# Patient Record
Sex: MALE | Race: WHITE | Employment: UNEMPLOYED | URBAN - METROPOLITAN AREA
[De-identification: names, ages, dates, MRNs, and addresses within clinical notes are randomized per-mention and may not be internally consistent; named-entity substitution may affect disease eponyms.]

---

## 2022-06-24 ENCOUNTER — OFFICE VISIT (OUTPATIENT)
Dept: ORTHOPEDIC SURGERY | Age: 52
End: 2022-06-24
Payer: COMMERCIAL

## 2022-06-24 DIAGNOSIS — M25.562 LEFT KNEE PAIN, UNSPECIFIED CHRONICITY: Primary | ICD-10-CM

## 2022-06-24 DIAGNOSIS — S83.242A ACUTE MEDIAL MENISCUS TEAR OF LEFT KNEE, INITIAL ENCOUNTER: ICD-10-CM

## 2022-06-24 PROCEDURE — 99204 OFFICE O/P NEW MOD 45 MIN: CPT | Performed by: ORTHOPAEDIC SURGERY

## 2022-06-24 NOTE — PROGRESS NOTES
Date:  2022    Name:  Bertram Wooten  Address:  [address] [place] [address]    :  1970      Age:   50 y.o.    SSN:  [id number]      Medical Record Number:  [id number]    Reason for Visit:    Chief Complaint    Knee Pain (new patient left knee)      DOS:2022     HPI: Bertram Wooten is a 50 y.o. male here today for evaluation of left knee pain. The patient reports that his knee pain has been going on for the past 2 years with no specific Injury. The pain is worse with going down stairs and walking for long periods. The patient complains of the catching and locking with a popping sensation in his left knee. He denies swelling, numbness or tingling. The pain occasionally wakes him up at night. The patient had a history of bilateral knee arthroscopy back in the 80s for chondromalacia. The patient has not tried any treatment so far. ROS: All systems reviewed on patient intake form. Pertinent items are noted in HPI. No past medical history on file. No past surgical history on file. No family history on file. Social History     Socioeconomic History    Marital status:      Spouse name: Not on file    Number of children: Not on file    Years of education: Not on file    Highest education level: Not on file   Occupational History    Not on file   Tobacco Use    Smoking status: Not on file    Smokeless tobacco: Not on file   Substance and Sexual Activity    Alcohol use: Not on file    Drug use: Not on file    Sexual activity: Not on file   Other Topics Concern    Not on file   Social History Narrative    Not on file     Social Determinants of Health     Financial Resource Strain:     Difficulty of Paying Living Expenses: Not on file   Food Insecurity:     Worried About Running Out of Food in the Last Year: Not on file    Kenneth of Food in the Last Year: Not on file   Transportation Needs:     Lack of Transportation (Medical):  Not on file  Lack of Transportation (Non-Medical): Not on file   Physical Activity:     Days of Exercise per Week: Not on file    Minutes of Exercise per Session: Not on file   Stress:     Feeling of Stress : Not on file   Social Connections:     Frequency of Communication with Friends and Family: Not on file    Frequency of Social Gatherings with Friends and Family: Not on file    Attends Yazidism Services: Not on file    Active Member of [address] [place] [address] or Organizations: Not on file    Attends Club or Organization Meetings: Not on file    Marital Status: Not on file   Intimate Partner Violence:     Fear of Current or Ex-Partner: Not on file    Emotionally Abused: Not on file    Physically Abused: Not on file    Sexually Abused: Not on file   Housing Stability:     Unable to Pay for Housing in the Last Year: Not on file    Number of [place] in the Last Year: Not on file    Unstable Housing in the Last Year: Not on file       No current outpatient medications on file. No current facility-administered medications for this visit. Not on File    Vital signs: There were no vitals taken for this visit. Left knee exam    Gait: No use of assistive devices. No antalgic gait. Alignment: normal alignment. Inspection/skin: Skin is intact without erythema or ecchymosis. No gross deformity. Palpation: no crepitus. Mild tenderness at the medial joint line tenderness present. Range of Motion: There is full range of motion. Strength: Normal quadriceps development. Effusion: No effusion or swelling present. Ligamentous stability: No cruciate or collateral ligament instability. Neurologic and vascular: Skin is warm and well-perfused. Sensation is intact to light-touch. Special tests: Negative Gena sign. Right knee exam    Gait: No use of assistive devices. No antalgic gait. Alignment: normal alignment.     Inspection/skin: Skin is intact without erythema or ecchymosis. No gross deformity. Palpation: no crepitus. no joint line tenderness present. Range of Motion: There is full range of motion. Strength: Normal quadriceps development. Effusion: No effusion or swelling present. Ligamentous stability: No cruciate or collateral ligament instability. Neurologic and vascular: Skin is warm and well-perfused. Sensation is intact to light-touch. Special tests: Negative Gena sign. Diagnostics:  Radiology:     Radiographs were obtained and reviewed in the office; 4 views: bilateral PA, bilateral Knowles, bilateral Merchants AND left lateral    Impression: No signs of acute fracture or dislocation. No signs of advanced osteoarthritis. Assessment: 53-year-old male patient with left knee pain with catching and locking sensation suggestive of loose body versus meniscal tear    Plan: Pertinent imaging was reviewed. The etiology, natural history, and treatment options for the disorder were discussed. The roles of activity medication, antiinflammatories, injections, bracing, physical therapy, and surgical interventions were all described to the patient and questions were answered. We discussed the diagnosis of unstable meniscal tear versus loose body in the left knee joint. We recommend MRI of the left knee to rule out meniscal tear versus loose body. Patient would like to proceed. We will see him back in the office after the MRI is done, sooner if needed. Maximus Ceederick is in agreement with this plan. All questions were answered to patient's satisfaction and was encouraged to call with any further questions. The patient was advised that NSAID-type medications have several potential side effects that include: gastrointestinal irritation including hemorrhage, renal injury, as well as an increased risk for heart attack and stroke.  The patient was asked to take the medication with food and to stop if there is any symptoms of GI upset, described above.      Cilnt Bullard MD

## 2022-07-08 ENCOUNTER — OFFICE VISIT (OUTPATIENT)
Dept: ORTHOPEDIC SURGERY | Age: 52
End: 2022-07-08
Payer: COMMERCIAL

## 2022-07-08 VITALS — HEIGHT: 71 IN | WEIGHT: 200 LBS | BODY MASS INDEX: 28 KG/M2

## 2022-07-08 DIAGNOSIS — M22.42 CHONDROMALACIA OF LEFT PATELLA: ICD-10-CM

## 2022-07-08 DIAGNOSIS — M17.12 PRIMARY OSTEOARTHRITIS OF LEFT KNEE: Primary | ICD-10-CM

## 2022-07-08 PROCEDURE — 99214 OFFICE O/P EST MOD 30 MIN: CPT | Performed by: PHYSICIAN ASSISTANT

## 2022-07-08 RX ORDER — MELOXICAM 15 MG/1
15 TABLET ORAL DAILY
Qty: 30 TABLET | Refills: 2 | Status: SHIPPED | OUTPATIENT
Start: 2022-07-08

## 2022-07-08 RX ORDER — ALBUTEROL SULFATE 90 UG/1
2 AEROSOL, METERED RESPIRATORY (INHALATION) EVERY 4 HOURS PRN
COMMUNITY
Start: 2022-02-01

## 2022-07-08 RX ORDER — FAMOTIDINE 40 MG/1
40 TABLET, FILM COATED ORAL DAILY
COMMUNITY
Start: 2022-04-29

## 2022-07-08 NOTE — PROGRESS NOTES
collateral ligament instability.      Neurologic and vascular: Skin is warm and well-perfused. Sensation is intact to light-touch.      Special tests: Negative Gena sign.         Right knee exam     Gait: No use of assistive devices. No antalgic gait.     Alignment: normal alignment.     Inspection/skin: Skin is intact without erythema or ecchymosis. No gross deformity.      Palpation: no crepitus. no joint line tenderness present.     Range of Motion: There is full range of motion.      Strength: Normal quadriceps development.      Effusion: No effusion or swelling present.      Ligamentous stability: No cruciate or collateral ligament instability.      Neurologic and vascular: Skin is warm and well-perfused. Sensation is intact to light-touch.      Special tests: Negative Gena sign.        Radiology:       Pertinent imaging was interpreted and reviewed with the patient today, both images and report. Left knee MRI on 6/30/2022     CONCLUSION:   1. 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 2. No meniscal, cruciate or ligamentous injuries. 3. Post-surgical infrapatellar - ligamentum mucosum plical scar.        Thank you for the opportunity to provide your interpretation. 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     Primary osteoarthritis of left knee Yes    Chondromalacia of left patella        Office Procedures:  Orders Placed This Encounter   Procedures   Reddy Kelly 49 ([facility])-[facility]     Referral Priority:   Routine     Referral Type:   Eval and Treat     Referral Reason:   Specialty Services Required     Requested Specialty:   Physical Therapist     Number of Visits Requested:   1         Plan: Pertinent imaging was reviewed. The etiology, natural history, and treatment options for the disorder were discussed. The roles of activity medication, antiinflammatories, injections, bracing, physical therapy, and surgical interventions were all described to the patient and questions were answered. Sintia Hollingsworth does have focal cartilage damage of the trochlear notch with cyst formation which looks unstable. Patient has not tried conservative measures yet therefore I do believe he is a reasonable candidate for physical therapy and meloxicam.  We will see him back in 1 month for reevaluation. Gaurang Roger is in agreement with this plan. All questions were answered to patient's satisfaction and was encouraged to call with any further questions. Total time spent for evaluation, education, and development of treatment plan: 35 minutes    Kayleigh Bajwa, [address] [facility]  7/8/2022    This dictation was performed with a verbal recognition program [facility]) and it was checked for errors. It is possible that there are still dictated errors within this office note. If so, please bring any areas to my attention for an addendum. All efforts were made to ensure that this office note is accurate.

## 2022-07-18 ENCOUNTER — HOSPITAL ENCOUNTER (OUTPATIENT)
Dept: PHYSICAL THERAPY | Age: 52
Setting detail: THERAPIES SERIES
Discharge: HOME | End: 2022-07-18
Payer: COMMERCIAL

## 2022-07-18 PROCEDURE — 97161 PT EVAL LOW COMPLEX 20 MIN: CPT

## 2022-07-18 PROCEDURE — 97110 THERAPEUTIC EXERCISES: CPT

## 2022-07-18 NOTE — FLOWSHEET NOTE
The [address] [place] [address]      Physical Therapy Treatment Note/ Progress Report:     Date:  2022    Patient Name:  Jermain Kapadia    :  1970  MRN: [id number]  Restrictions/Precautions:    Medical/Treatment Diagnosis Information:  Diagnosis: M17.12 primary OA L knee, M22.42 chondromalacia L patella  Treatment Diagnosis: M25.562 L knee pain  Insurance/Certification information:   [facility]  Physician Information:   Luis Antonio Gallego  Has the plan of care been signed (Y/N):        []  Yes  [x]  No     Date of Patient follow up with Physician: 22    Is this a Progress Report:     []  Yes  [x]  No      If Yes:  Date Range for reporting period:  Beginning:  ------------ Ending:     Progress report will be due (10 Rx or 30 days whichever is less):        Recertification will be due (POC Duration  / 90 days whichever is less):         Visit # Insurance Allowable Auth Required   In Person 1 Check NV []  Yes     []  No    Tele Health   []  Yes     []  No    Total 1       Functional Scale: FOTO: 64/100    Date assessed:  22      Latex Allergy:  [x]NO      []YES  Preferred Language for Healthcare:   [x]English       []other:    Pain level:  3/10     SUBJECTIVE:  See eval    OBJECTIVE: See eval  Observation:   Test measurements:      RESTRICTIONS/PRECAUTIONS: OA    Exercises/Interventions:   Therapeutic Ex (30873) Sets/sec Reps Notes/CUES HEP   SLR 2-3 10     Bridge 2-3 10     SL hip ABD 2-3 10     HS S 30\" 2-3                                                      Pt edu: HEP, dx, POC, ice       Manual Intervention (98316)       Rolling  NV                                         NMR re-education (05572)   CUES NEEDED                                                                   Therapeutic Activity (99553)                                          [employer] access code: [email or web address]           Therapeutic Exercise and NMR EXR  [x] (81888) Provided verbal/tactile cueing for activities related to strengthening, flexibility, endurance, ROM for improvements in LE, proximal hip, and core control with self care, mobility, lifting, ambulation. [x] (72227) Provided verbal/tactile cueing for activities related to improving balance, coordination, kinesthetic sense, posture, motor skill, proprioception to assist with LE, proximal hip, and core control in self-care, mobility, lifting, ambulation and eccentric single leg control.      NMR and Therapeutic Activities:    [x] (52426 or 25815) Provided verbal/tactile cueing for activities related to improving balance, coordination, kinesthetic sense, posture, motor skill, proprioception and motor activation to allow for proper function of core, proximal hip and LE with self-care and ADLs and functional mobility.   [] (97555) Gait Re-education- Provided training and instruction to the patient for proper LE, core and proximal hip recruitment and positioning and eccentric body weight control with ambulation re-education including up and down stairs     Home Exercise Program:    [x] (50089) Reviewed/Progressed HEP activities related to strengthening, flexibility, endurance, ROM of core, proximal hip and LE for functional self-care, mobility, lifting and ambulation/stair navigation   [] (36248) Reviewed/Progressed HEP activities related to improving balance, coordination, kinesthetic sense, posture, motor skill, proprioception of core, proximal hip and LE for self-care, mobility, lifting, and ambulation/stair navigation      Manual Treatments:  PROM / STM / Oscillations-Mobs:  G-I, II, III, IV (PA's, Inf., Post.)  [] (81852) Provided manual therapy to mobilize LE, proximal hip and/or LS spine soft tissue/joints for the purpose of modulating pain, promoting relaxation, increasing ROM, reducing/eliminating soft tissue swelling/inflammation/restriction, improving soft tissue extensibility and allowing for proper ROM for normal function with self-care, mobility, lifting and ambulation. Modalities:  ice x10 min NV   [] GAME READY (VASO)- for significant edema, swelling, pain control. Charges:  Timed Code Treatment Minutes: 25   Total Treatment Minutes:  45      [x] EVAL (LOW) 66927 (typically 20 minutes face-to-face)  [] EVAL (MOD) 21996 (typically 30 minutes face-to-face)  [] EVAL (HIGH) 90013 (typically 45 minutes face-to-face)  [] RE-EVAL     [x] UD(55106) x   2  [] IONTO  [] NMR (52548) x     [] VASO  [] Manual (40146) x     [] Other:  [] TA x      [] Mech Traction (24444)  [] ES(attended) (38133)      [] ES (un) (68969):    ASSESSMENT:  See eval    GOALS:     Patient stated goal: none     Therapist goals for Patient:  Short Term Goals: To be achieved in: 2 weeks  1. Independent in HEP and progression per patient tolerance, in order to prevent re-injury. [] Progressing: [] Met: [] Not Met: [] Adjusted     2. Patient will have a decrease in pain to facilitate improvement in movement, function, and ADLs as indicated by Functional Deficits. [] Progressing: [] Met: [] Not Met: [] Adjusted     Long Term Goals: To be achieved in: 6-8 weeks  1. FOTO >/= 75/100 to assist with reaching prior level of function. [] Progressing: [] Met: [] Not Met: [] Adjusted     2. Patient will demonstrate increased AROM to WNL to allow for proper joint functioning as indicated by patients Functional Deficits. [] Progressing: [] Met: [] Not Met: [] Adjusted     3. Patient will demonstrate an increase in Strength to good proximal hip strength and control, to 5/5 in LE to allow for proper functional mobility as indicated by patients Functional Deficits. [] Progressing: [] Met: [] Not Met: [] Adjusted     4. Patient will return to all functional activities without increased symptoms or restriction. [] Progressing: [] Met: [] Not Met: [] Adjusted     5.  Pt will exhibit normalized gait pattern. (patient specific functional goal)    [] Progressing: [] Met: [] Not Met: [] Adjusted          Access Code: [email or web address]  URL: [email or web address]  Date: 07/18/2022  Prepared by: Brittani Comes    Exercises  Supine Active Straight Leg Raise - 1 x daily - 7 x weekly - 2-3 sets - 10 reps  Supine Bridge - 1 x daily - 7 x weekly - 2-3 sets - 10 reps  Sidelying Hip Abduction - 1 x daily - 7 x weekly - 2-3 sets - 10 reps  Seated Hamstring Stretch - 1 x daily - 7 x weekly - 3 sets - 10 reps      Overall Progression Towards Functional goals/ Treatment Progress Update:  [] Patient is progressing as expected towards functional goals listed. [] Progression is slowed due to complexities/Impairments listed. [] Progression has been slowed due to co-morbidities.   [x] Plan just implemented, too soon to assess goals progression <30days   [] Goals require adjustment due to lack of progress  [] Patient is not progressing as expected and requires additional follow up with physician  [] Other    Prognosis for POC: [x] Good [] Fair  [] Poor      Patient requires continued skilled intervention: [x] Yes  [] No    Treatment/Activity Tolerance:  [x] Patient able to complete treatment  [] Patient limited by fatigue  [] Patient limited by pain    [] Patient limited by other medical complications  [] Other:     Return to Play: (if applicable)   []  Stage 1: Intro to Strength   []  Stage 2: Return to Run and Strength   []  Stage 3: Return to Jump and Strength   []  Stage 4: Dynamic Strength and Agility   []  Stage 5: Sport Specific Training     []  Ready to Return to Play, Meets All Above Stages   []  Not Ready for Return to Sports   Comments:                          PLAN: See eval  [] Continue per plan of care [] Alter current plan (see comments above)  [x] Plan of care initiated [] Hold pending MD visit [] Discharge    Electronically signed by:  Jeremias Carlson PT    Note: If patient does not return for scheduled/ recommended follow up visits, this note will serve as a discharge from care along with most recent update on progress.

## 2022-07-18 NOTE — PLAN OF CARE
The [address] [place] [address]       Physical Therapy Certification    Dear  JHOANA Shelby,    We had the pleasure of evaluating the following patient for physical therapy services at [address] [place] [address]. A summary of our findings can be found in the initial assessment below. This includes our plan of care. If you have any questions or concerns regarding these findings, please do not hesitate to contact me at the office phone number checked above. Thank you for the referral.       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   [] Yes, Patient intake triggered further evaluation      [] C-SSRS Screening completed  [] PCP notified via Plan of Care  [] Emergency services notified     Latex Allergy:  [x]NO      []YES  Preferred Language for Healthcare:   [x]English       []other:    SUBJECTIVE: Patient stated complaint: Pt c/o L knee pain that started off and on a couple years ago. Pt has hx of remote L knee pain  in high school but notes this is a recurrence of the same issue. Pt notes 2-3 months ago dull pain became constant and since then has been worsening. Pt occasionally gets a sharp pain and a pop that is very painful for a of time after that but does resolve back to constant achy pain.  Pt notes his L knee feels unstable and has buckled. Pt has not noticed any swelling however is not sure that there is none. Pt's pain is deep within knee, from sides of patella and anterior knee. Pt finally saw Dr. Mary Jane Paredes and Angel Daniels who recommended PT first however pt notes he feels he may have to surgery in the end.      Relevant Medical History:(-) latex allergy, (-) adhesive sensitivity, (-) pacemaker, (-) metal/electrical implants, (-) heart history, (-) CA, (-) stroke, (-) seizure, (-) diabetes, (-) asthma or SOB    Functional Disability Index: FOTO: 64/100    Pain Scale: 3/10 (dull achy)  Easing factors: tylenol prn  Provocative factors: down steps worse than up steps, prolonged walking, prolonged activity     Type: [x]Constant   []Intermittent  []Radiating [x]Localized []other:     Numbness/Tingling: denies    Occupation/School: unemployed    Living Status/Prior Level of Function: Independent with ADLs and IADLs,     OBJECTIVE:     ROM LEFT RIGHT   HIP Flex     HIP Abd     HIP Ext     HIP IR     HIP ER     Knee ext 0 °  0 °    Knee Flex 140 °  142  °    Ankle PF     Ankle DF     Ankle In     Ankle Ev     Strength  LEFT RIGHT   HIP Flexors     HIP Abductors     HIP Ext     Hip ER     Knee EXT (quad) 4/5 5/5   Knee Flex (HS) 5/5 5/5   Ankle DF     Ankle PF     Ankle Inv     Ankle EV          Circumference  Mid patella     39.0 cm     39.0 cm       Reflexes/Sensation:    [x]Dermatomes/Myotomes intact    [x]Reflexes equal and normal bilaterally   []Other:    Joint mobility:    []Normal    [x]Hypo: patella inf/sup; med/lat painful both directions   []Hyper    Palpation: not overly tender to palpation however ? sensitivity compared to other knee    Functional Mobility/Transfers: some limitation with sit to/from stand    Posture: static stance: ? toeing out LLE    Bandages/Dressings/Incisions: N/A    Gait: (include devices/WB status) antalgic gait, ? quad control in swing phase    Orthopedic Special Tests: NT [x] Patient history, allergies, meds reviewed. Medical chart reviewed. See intake form. Review Of Systems (ROS):  [x]Performed Review of systems (Integumentary, CardioPulmonary, Neurological) by intake and observation. Intake form has been scanned into medical record. Patient has been instructed to contact their primary care physician regarding ROS issues if not already being addressed at this time. Co-morbidities/Complexities (which will affect course of rehabilitation):   []None           Arthritic conditions   []Rheumatoid arthritis (M05.9)  [x]Osteoarthritis (M19.91)   Cardiovascular conditions   []Hypertension (I10)  []Hyperlipidemia (E78.5)  []Angina pectoris (I20)  []Atherosclerosis (I70)   Musculoskeletal conditions   []Disc pathology   []Congenital spine pathologies   []Prior surgical intervention  []Osteoporosis (M81.8)  []Osteopenia (M85.8)   Endocrine conditions   []Hypothyroid (E03.9)  []Hyperthyroid Gastrointestinal conditions   []Constipation (V22.41)   Metabolic conditions   []Morbid obesity (E66.01)  []Diabetes type 1(E10.65) or 2 (E11.65)   []Neuropathy (G60.9)     Pulmonary conditions   []Asthma (J45)  []Coughing   []COPD (J44.9)   Psychological Disorders  []Anxiety (F41.9)  []Depression (F32.9)   []Other:   []Other:          Barriers to/and or personal factors that will affect rehab potential:              []Age  []Sex              []Motivation/Lack of Motivation                        []Co-Morbidities              []Cognitive Function, education/learning barriers              []Environmental, home barriers              []profession/work barriers  []past PT/medical experience  []other:  Justification: none    Falls Risk Assessment (30 days):   [x] Falls Risk assessed and no intervention required.   [] Falls Risk assessed and Patient requires intervention due to being higher risk   TUG score (>12s at risk):     [] Falls education provided, including         ASSESSMENT: Functional Impairments:     [x]Noted lumbar/proximal hip/LE joint hypomobility   [x]Decreased LE functional ROM   [x]Decreased core/proximal hip strength and neuromuscular control   [x]Decreased LE functional strength   [x]Reduced balance/proprioceptive control   []other:      Functional Activity Limitations (from functional questionnaire and intake)   [x]Reduced ability to tolerate prolonged functional positions   [x]Reduced ability or difficulty with changes of positions or transfers between positions   []Reduced ability to maintain good posture and demonstrate good body mechanics with sitting, bending, and lifting   []Reduced ability to sleep   [] Reduced ability or tolerance with driving and/or computer work   [x]Reduced ability to perform lifting, carrying tasks   [x]Reduced ability to squat   [x]Reduced ability to forward bend   [x]Reduced ability to ambulate prolonged functional periods/distances/surfaces   [x]Reduced ability to ascend/descend stairs   [x]Reduced ability to run, hop, cut or jump   []other:    Participation Restrictions   []Reduced participation in self care activities   [x]Reduced participation in home management activities   []Reduced participation in work activities   [x]Reduced participation in social activities. [x]Reduced participation in sport/recreation activities. Classification :    []Signs/symptoms consistent with post-surgical status including decreased ROM, strength and function.    []Signs/symptoms consistent with joint sprain/strain   []Signs/symptoms consistent with patella-femoral syndrome   [x]Signs/symptoms consistent with knee OA/hip OA   []Signs/symptoms consistent with internal derangement of knee/Hip   []Signs/symptoms consistent with functional hip weakness/NMR control      []Signs/symptoms consistent with tendinitis/tendinosis    []signs/symptoms consistent with pathology which may benefit from Dry needling      []other:      Prognosis/Rehab Potential: []Excellent   [x]Good    []Fair   []Poor    Tolerance of evaluation/treatment:    []Excellent   [x]Good    []Fair   []Poor    Physical Therapy Evaluation Complexity Justification  [x] A history of present problem with:  [x] no personal factors and/or comorbidities that impact the plan of care;  []1-2 personal factors and/or comorbidities that impact the plan of care  []3 personal factors and/or comorbidities that impact the plan of care  [x] An examination of body systems using standardized tests and measures addressing any of the following: body structures and functions (impairments), activity limitations, and/or participation restrictions;:  [] a total of 1-2 or more elements   [x] a total of 3 or more elements   [] a total of 4 or more elements   [x] A clinical presentation with:  [x] stable and/or uncomplicated characteristics   [] evolving clinical presentation with changing characteristics  [] unstable and unpredictable characteristics;   [x] Clinical decision making of [x] low, [] moderate, [] high complexity using standardized patient assessment instrument and/or measurable assessment of functional outcome. [x] EVAL (LOW) 83907 (typically 20 minutes face-to-face)  [] EVAL (MOD) 77860 (typically 30 minutes face-to-face)  [] EVAL (HIGH) 15265 (typically 45 minutes face-to-face)  [] RE-EVAL     PLAN:   Frequency/Duration:  1-2 days per week for 6-8 Weeks:  Interventions:  [x]  Therapeutic exercise including: strength training, ROM, for Lower extremity and core   [x]  NMR activation and proprioception for LE, Glutes and Core   [x]  Manual therapy as indicated for LE, Hip and spine to include: Dry Needling/IASTM, STM, PROM, Gr I-IV mobilizations, manipulation. [x] Modalities as needed that may include: thermal agents, E-stim, Biofeedback, US, iontophoresis as indicated  [x] Patient education on joint protection, postural re-education, activity modification, progression of HEP.     HEP instruction: Refer to Mayela Rubio access code and exercises on the 1st visit treatment note    GOALS:  Patient stated goal: none    Therapist goals for Patient:   Short Term Goals: To be achieved in: 2 weeks  1. Independent in HEP and progression per patient tolerance, in order to prevent re-injury. [] Progressing: [] Met: [] Not Met: [] Adjusted     2. Patient will have a decrease in pain to facilitate improvement in movement, function, and ADLs as indicated by Functional Deficits. [] Progressing: [] Met: [] Not Met: [] Adjusted     Long Term Goals: To be achieved in: 6-8 weeks  1. FOTO >/= 75/100 to assist with reaching prior level of function. [] Progressing: [] Met: [] Not Met: [] Adjusted     2. Patient will demonstrate increased AROM to WNL to allow for proper joint functioning as indicated by patients Functional Deficits. [] Progressing: [] Met: [] Not Met: [] Adjusted     3. Patient will demonstrate an increase in Strength to good proximal hip strength and control, to 5/5 in LE to allow for proper functional mobility as indicated by patients Functional Deficits. [] Progressing: [] Met: [] Not Met: [] Adjusted     4. Patient will return to all functional activities without increased symptoms or restriction. [] Progressing: [] Met: [] Not Met: [] Adjusted     5.  Pt will exhibit normalized gait pattern. (patient specific functional goal)    [] Progressing: [] Met: [] Not Met: [] Adjusted      Electronically signed by:  Ashwini Montez, PT

## 2022-07-25 ENCOUNTER — HOSPITAL ENCOUNTER (OUTPATIENT)
Dept: PHYSICAL THERAPY | Age: 52
Setting detail: THERAPIES SERIES
Discharge: HOME | End: 2022-07-25
Payer: COMMERCIAL

## 2022-07-25 PROCEDURE — 97110 THERAPEUTIC EXERCISES: CPT

## 2022-07-25 PROCEDURE — 97140 MANUAL THERAPY 1/> REGIONS: CPT

## 2022-07-25 NOTE — FLOWSHEET NOTE
NMR re-education (77821)   CUES NEEDED                                                                   Therapeutic Activity (94380)                                          [employer] access code: [email or web address]           Therapeutic Exercise and NMR EXR  [x] (89233) Provided verbal/tactile cueing for activities related to strengthening, flexibility, endurance, ROM for improvements in LE, proximal hip, and core control with self care, mobility, lifting, ambulation. [x] (46958) Provided verbal/tactile cueing for activities related to improving balance, coordination, kinesthetic sense, posture, motor skill, proprioception to assist with LE, proximal hip, and core control in self-care, mobility, lifting, ambulation and eccentric single leg control.      NMR and Therapeutic Activities:    [x] (69591 or 16753) Provided verbal/tactile cueing for activities related to improving balance, coordination, kinesthetic sense, posture, motor skill, proprioception and motor activation to allow for proper function of core, proximal hip and LE with self-care and ADLs and functional mobility.   [] (82066) Gait Re-education- Provided training and instruction to the patient for proper LE, core and proximal hip recruitment and positioning and eccentric body weight control with ambulation re-education including up and down stairs     Home Exercise Program:    [x] (79735) Reviewed/Progressed HEP activities related to strengthening, flexibility, endurance, ROM of core, proximal hip and LE for functional self-care, mobility, lifting and ambulation/stair navigation   [] (81638) Reviewed/Progressed HEP activities related to improving balance, coordination, kinesthetic sense, posture, motor skill, proprioception of core, proximal hip and LE for self-care, mobility, lifting, and ambulation/stair navigation      Manual Treatments:  PROM / STM / Oscillations-Mobs:  G-I, II, III, IV (PA's, Inf., Post.)  [] (05110) Provided manual therapy to mobilize LE, proximal hip and/or LS spine soft tissue/joints for the purpose of modulating pain, promoting relaxation, increasing ROM, reducing/eliminating soft tissue swelling/inflammation/restriction, improving soft tissue extensibility and allowing for proper ROM for normal function with self-care, mobility, lifting and ambulation. Modalities:  declined ice   [] GAME READY (VASO)- for significant edema, swelling, pain control. Charges:  Timed Code Treatment Minutes: 40   Total Treatment Minutes:  40      [] EVAL (LOW) 19446 (typically 20 minutes face-to-face)  [] EVAL (MOD) 38453 (typically 30 minutes face-to-face)  [] EVAL (HIGH) 76330 (typically 45 minutes face-to-face)  [] RE-EVAL     [x] ZD(37382) x   2  [] IONTO  [] NMR (59386) x     [] VASO  [x] Manual (22573) x    1 [] Other:  [] TA x      [] Mech Traction (80284)  [] ES(attended) (03697)      [] ES (un) (01763):    ASSESSMENT:  Pt exhibits hip posterior capsule tightness as well as ? quad strength and stability contributing to tightness with change in position. Pt will benefit from focused quad and lateral hip strength as well as ? hip mobility to improve tolerance to functional activities. GOALS:     Patient stated goal: none     Therapist goals for Patient:  Short Term Goals: To be achieved in: 2 weeks  1. Independent in HEP and progression per patient tolerance, in order to prevent re-injury. [] Progressing: [] Met: [] Not Met: [] Adjusted     2. Patient will have a decrease in pain to facilitate improvement in movement, function, and ADLs as indicated by Functional Deficits. [] Progressing: [] Met: [] Not Met: [] Adjusted     Long Term Goals: To be achieved in: 6-8 weeks  1. FOTO >/= 75/100 to assist with reaching prior level of function. [] Progressing: [] Met: [] Not Met: [] Adjusted     2.  Patient will demonstrate increased AROM to WNL to allow for proper joint functioning as indicated by patients Functional Deficits. [] Progressing: [] Met: [] Not Met: [] Adjusted     3. Patient will demonstrate an increase in Strength to good proximal hip strength and control, to 5/5 in LE to allow for proper functional mobility as indicated by patients Functional Deficits. [] Progressing: [] Met: [] Not Met: [] Adjusted     4. Patient will return to all functional activities without increased symptoms or restriction. [] Progressing: [] Met: [] Not Met: [] Adjusted     5. Pt will exhibit normalized gait pattern. (patient specific functional goal)    [] Progressing: [] Met: [] Not Met: [] Adjusted          Access Code: [email or web address]  URL: [email or web address]  Date: 07/18/2022  Prepared by: Genaro Neither    Exercises  Supine Active Straight Leg Raise - 1 x daily - 7 x weekly - 2-3 sets - 10 reps  Supine Bridge - 1 x daily - 7 x weekly - 2-3 sets - 10 reps  Sidelying Hip Abduction - 1 x daily - 7 x weekly - 2-3 sets - 10 reps  Seated Hamstring Stretch - 1 x daily - 7 x weekly - 3 sets - 10 reps      Overall Progression Towards Functional goals/ Treatment Progress Update:  [] Patient is progressing as expected towards functional goals listed. [] Progression is slowed due to complexities/Impairments listed. [] Progression has been slowed due to co-morbidities.   [x] Plan just implemented, too soon to assess goals progression <30days   [] Goals require adjustment due to lack of progress  [] Patient is not progressing as expected and requires additional follow up with physician  [] Other    Prognosis for POC: [x] Good [] Fair  [] Poor      Patient requires continued skilled intervention: [x] Yes  [] No    Treatment/Activity Tolerance:  [x] Patient able to complete treatment  [] Patient limited by fatigue  [] Patient limited by pain    [] Patient limited by other medical complications  [] Other:                         PLAN: See eval  [x] Continue per plan of care [] Alter current plan (see comments above)  [] Plan of care initiated [] Hold pending MD visit [] Discharge    Electronically signed by:  Jim Macias PT    Note: If patient does not return for scheduled/ recommended follow up visits, this note will serve as a discharge from care along with most recent update on progress.

## 2022-08-01 ENCOUNTER — HOSPITAL ENCOUNTER (OUTPATIENT)
Dept: PHYSICAL THERAPY | Age: 52
Setting detail: THERAPIES SERIES
Discharge: HOME | End: 2022-08-01
Payer: COMMERCIAL

## 2022-08-01 PROCEDURE — 97110 THERAPEUTIC EXERCISES: CPT

## 2022-08-01 PROCEDURE — 97140 MANUAL THERAPY 1/> REGIONS: CPT

## 2022-08-01 NOTE — FLOWSHEET NOTE
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  Treatment Diagnosis: M25.562 L knee pain  Insurance/Certification information:   [facility]  Physician Information:   Demetrio Cranker  Has the plan of care been signed (Y/N):        [x]  Yes  []  No     Date of Patient follow up with Physician: 22    Is this a Progress Report:     []  Yes  [x]  No      If Yes:  Date Range for reporting period:  Beginning:  ------------ Ending:     Progress report will be due (10 Rx or 30 days whichever is less):        Recertification will be due (POC Duration  / 90 days whichever is less):         Visit # Insurance Allowable Auth Required   In Person 3 Check NV []  Yes     []  No    Tele Health   []  Yes     []  No    Total 3       Functional Scale: FOTO: 64/100    Date assessed:  22      Latex Allergy:  [x]NO      []YES  Preferred Language for Healthcare:   [x]English       []other:    Pain level:  3/10     SUBJECTIVE:  Pt reports he feels overall more consistent pain and it hurts more to use his knee. Pt notes he has not had sharp pain but thinks that is because he is more conscious of not doing anything to hurt. Pt also notes some difficulty getting comfortable at night that's not new but is more persistent recently.      OBJECTIVE:   Observation: lateral tracking patellae  Test measurements:      RESTRICTIONS/PRECAUTIONS: OA    Exercises/Interventions:   Therapeutic Ex (56574) Sets/sec Reps Notes/CUES HEP   SLR 3 10 bilat x   Bridge + hip add iso 3 10  x   SL hip ABD  3 10 bilat x   HS S  HS S supine 30\"  20\" 5  5 Seated, bilat X  x   5\" 15 BlueTB    Gastroc S 30\" 5 bilat x                                      Pt edu: dx and progression of care x5'  Discussed would stop PT should pain cont to ? Manual Intervention (46297)       Rolling : quad, HS, gastroc x7'      Post hip mobs Left x4'  Note improved hip IR after mobs                                NMR re-education (77528)   CUES NEEDED                                                                   Therapeutic Activity (16346)                                          Mega[employer]oot access code: [email or web address]           Therapeutic Exercise and NMR EXR  [x] (28432) Provided verbal/tactile cueing for activities related to strengthening, flexibility, endurance, ROM for improvements in LE, proximal hip, and core control with self care, mobility, lifting, ambulation. [x] (17115) Provided verbal/tactile cueing for activities related to improving balance, coordination, kinesthetic sense, posture, motor skill, proprioception to assist with LE, proximal hip, and core control in self-care, mobility, lifting, ambulation and eccentric single leg control.      NMR and Therapeutic Activities:    [x] (42579 or 51525) Provided verbal/tactile cueing for activities related to improving balance, coordination, kinesthetic sense, posture, motor skill, proprioception and motor activation to allow for proper function of core, proximal hip and LE with self-care and ADLs and functional mobility.   [] (63554) Gait Re-education- Provided training and instruction to the patient for proper LE, core and proximal hip recruitment and positioning and eccentric body weight control with ambulation re-education including up and down stairs     Home Exercise Program:    [x] (38862) Reviewed/Progressed HEP activities related to strengthening, flexibility, endurance, ROM of core, proximal hip and LE for functional self-care, mobility, lifting and ambulation/stair navigation   [] (48412) Reviewed/Progressed HEP activities related to improving balance, coordination, kinesthetic sense, posture, motor skill, proprioception of core, proximal hip and LE for self-care, mobility, lifting, and ambulation/stair navigation      Manual Treatments:  PROM / STM / Oscillations-Mobs:  G-I, II, III, IV (PA's, Inf., Post.)  [x] (46115) Provided manual therapy to mobilize LE, proximal hip and/or LS spine soft tissue/joints for the purpose of modulating pain, promoting relaxation, increasing ROM, reducing/eliminating soft tissue swelling/inflammation/restriction, improving soft tissue extensibility and allowing for proper ROM for normal function with self-care, mobility, lifting and ambulation. Modalities:  declined ice   [] GAME READY (VASO)- for significant edema, swelling, pain control. Charges:  Timed Code Treatment Minutes: 40   Total Treatment Minutes:  40      [] EVAL (LOW) 00271 (typically 20 minutes face-to-face)  [] EVAL (MOD) 62097 (typically 30 minutes face-to-face)  [] EVAL (HIGH) 72473 (typically 45 minutes face-to-face)  [] RE-EVAL     [x] WT(81394) x   2  [] IONTO  [] NMR (21631) x     [] VASO  [x] Manual (14358) x    1 [] Other:  [] TA x      [] Mech Traction (95872)  [] ES(attended) (21927)      [] ES (un) (25735):    ASSESSMENT:  Pt exhibits hip posterior capsule tightness as well as ? quad strength and stability contributing to tightness with change in position. Pt will benefit from focused quad and lateral hip strength as well as ? hip mobility to improve tolerance to functional activities. GOALS:     Patient stated goal: none     Therapist goals for Patient:  Short Term Goals: To be achieved in: 2 weeks  1. Independent in HEP and progression per patient tolerance, in order to prevent re-injury. [] Progressing: [] Met: [] Not Met: [] Adjusted     2. Patient will have a decrease in pain to facilitate improvement in movement, function, and ADLs as indicated by Functional Deficits. [] Progressing: [] Met: [] Not Met: [] Adjusted     Long Term Goals: To be achieved in: 6-8 weeks  1.  FOTO >/= 75/100 to assist with reaching prior level of function. [] Progressing: [] Met: [] Not Met: [] Adjusted     2. Patient will demonstrate increased AROM to WNL to allow for proper joint functioning as indicated by patients Functional Deficits. [] Progressing: [] Met: [] Not Met: [] Adjusted     3. Patient will demonstrate an increase in Strength to good proximal hip strength and control, to 5/5 in LE to allow for proper functional mobility as indicated by patients Functional Deficits. [] Progressing: [] Met: [] Not Met: [] Adjusted     4. Patient will return to all functional activities without increased symptoms or restriction. [] Progressing: [] Met: [] Not Met: [] Adjusted     5. Pt will exhibit normalized gait pattern. (patient specific functional goal)    [] Progressing: [] Met: [] Not Met: [] Adjusted          Access Code: [email or web address]  URL: [email or web address]  Date: 07/18/2022  Prepared by: Inman Splinter    Exercises  Supine Active Straight Leg Raise - 1 x daily - 7 x weekly - 2-3 sets - 10 reps  Supine Bridge - 1 x daily - 7 x weekly - 2-3 sets - 10 reps  Sidelying Hip Abduction - 1 x daily - 7 x weekly - 2-3 sets - 10 reps  Seated Hamstring Stretch - 1 x daily - 7 x weekly - 3 sets - 10 reps      Overall Progression Towards Functional goals/ Treatment Progress Update:  [] Patient is progressing as expected towards functional goals listed. [] Progression is slowed due to complexities/Impairments listed. [] Progression has been slowed due to co-morbidities.   [x] Plan just implemented, too soon to assess goals progression <30days   [] Goals require adjustment due to lack of progress  [] Patient is not progressing as expected and requires additional follow up with physician  [] Other    Prognosis for POC: [x] Good [] Fair  [] Poor      Patient requires continued skilled intervention: [x] Yes  [] No    Treatment/Activity Tolerance:  [x] Patient able to complete treatment  [] Patient limited by fatigue  [] Patient limited by pain    [] Patient limited by other medical complications  [] Other:                         PLAN: See eval  [x] Continue per plan of care [] Alter current plan (see comments above)  [] Plan of care initiated [] Hold pending MD visit [] Discharge    Electronically signed by:  Erica Hassan PT    Note: If patient does not return for scheduled/ recommended follow up visits, this note will serve as a discharge from care along with most recent update on progress.

## 2022-08-08 ENCOUNTER — HOSPITAL ENCOUNTER (OUTPATIENT)
Dept: PHYSICAL THERAPY | Age: 52
Setting detail: THERAPIES SERIES
Discharge: HOME | End: 2022-08-08
Payer: COMMERCIAL

## 2022-08-08 PROCEDURE — 97110 THERAPEUTIC EXERCISES: CPT

## 2022-08-08 PROCEDURE — 97140 MANUAL THERAPY 1/> REGIONS: CPT

## 2022-08-08 NOTE — FLOWSHEET NOTE
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  Treatment Diagnosis: M25.562 L knee pain  Insurance/Certification information:   [facility]  Physician Information:   Brendon Burdick  Has the plan of care been signed (Y/N):        [x]  Yes  []  No     Date of Patient follow up with Physician: 22    Is this a Progress Report:     []  Yes  [x]  No      If Yes:  Date Range for reporting period:  Beginning:  ------------ Ending:     Progress report will be due (10 Rx or 30 days whichever is less):        Recertification will be due (POC Duration  / 90 days whichever is less):         Visit # Insurance Allowable Auth Required   In Person 4 Check NV []  Yes     []  No    Tele Health   []  Yes     []  No    Total 4       Functional Scale: FOTO: 64/100    Date assessed:  22      Latex Allergy:  [x]NO      []YES  Preferred Language for Healthcare:   [x]English       []other:    Pain level:  3/10     SUBJECTIVE:  Pt reports his pain is still around a 3/10. Pt has had a hard time doing the exercises due to pain. Pt had a hard time figuring out what triggers his pain. Pt states he has good and bad days. OBJECTIVE:   Observation: lateral tracking patellae  Test measurements:      RESTRICTIONS/PRECAUTIONS: OA    Exercises/Interventions:   Therapeutic Ex (91877) Sets/sec Reps Notes/CUES HEP   SLR 3 10 bilat X D/C for home   Bridge + hip add iso 3 10  x   SL hip ABD  3 10 bilat x   HS S  HS S supine 30\"  20\" 5  5 Seated, bilat X  x   5\" 15 BlueTB    Gastroc S 30\" 5 bilat x   clamshell NV      Wall sit 5\" 10 Tried , did cause ?  pain and flare up                          Discussed would stop PT should pain cont to ?     Manual Intervention (07572)       Rolling : quad, HS, gastroc x8'       Note improved hip IR after mobs                                NMR re-education (37600)   CUES NEEDED                                                                   Therapeutic Activity (11663)                                          [employer] access code: [email or web address]           Therapeutic Exercise and NMR EXR  [x] (16755) Provided verbal/tactile cueing for activities related to strengthening, flexibility, endurance, ROM for improvements in LE, proximal hip, and core control with self care, mobility, lifting, ambulation. [x] (02989) Provided verbal/tactile cueing for activities related to improving balance, coordination, kinesthetic sense, posture, motor skill, proprioception to assist with LE, proximal hip, and core control in self-care, mobility, lifting, ambulation and eccentric single leg control.      NMR and Therapeutic Activities:    [x] (02039 or 87482) Provided verbal/tactile cueing for activities related to improving balance, coordination, kinesthetic sense, posture, motor skill, proprioception and motor activation to allow for proper function of core, proximal hip and LE with self-care and ADLs and functional mobility.   [] (88940) Gait Re-education- Provided training and instruction to the patient for proper LE, core and proximal hip recruitment and positioning and eccentric body weight control with ambulation re-education including up and down stairs     Home Exercise Program:    [x] (78853) Reviewed/Progressed HEP activities related to strengthening, flexibility, endurance, ROM of core, proximal hip and LE for functional self-care, mobility, lifting and ambulation/stair navigation   [] (80909) Reviewed/Progressed HEP activities related to improving balance, coordination, kinesthetic sense, posture, motor skill, proprioception of core, proximal hip and LE for self-care, mobility, lifting, and ambulation/stair navigation Manual Treatments:  PROM / STM / Oscillations-Mobs:  G-I, II, III, IV (PA's, Inf., Post.)  [x] (43660) Provided manual therapy to mobilize LE, proximal hip and/or LS spine soft tissue/joints for the purpose of modulating pain, promoting relaxation, increasing ROM, reducing/eliminating soft tissue swelling/inflammation/restriction, improving soft tissue extensibility and allowing for proper ROM for normal function with self-care, mobility, lifting and ambulation. Modalities:  declined ice, will do at home   [] GAME READY (VASO)- for significant edema, swelling, pain control. Charges:  Timed Code Treatment Minutes: 40   Total Treatment Minutes:  40      [] EVAL (LOW) 48518 (typically 20 minutes face-to-face)  [] EVAL (MOD) 01282 (typically 30 minutes face-to-face)  [] EVAL (HIGH) 68848 (typically 45 minutes face-to-face)  [] RE-EVAL     [x] VS(30862) x   2  [] IONTO  [] NMR (71210) x     [] VASO  [x] Manual (54266) x    1 [] Other:  [] TA x      [] Mech Traction (50769)  [] ES(attended) (18577)      [] ES (un) (47089):    ASSESSMENT:  Pt exhibits hip posterior capsule tightness as well as ? quad strength and stability contributing to tightness with change in position. Pt will benefit from focused quad and lateral hip strength as well as ? hip mobility to improve tolerance to functional activities. Pt's knee pain is very irritable and quad activation causes ? pain. GOALS:     Patient stated goal: none     Therapist goals for Patient:  Short Term Goals: To be achieved in: 2 weeks  1. Independent in HEP and progression per patient tolerance, in order to prevent re-injury. [x] Progressing: [] Met: [] Not Met: [] Adjusted     2. Patient will have a decrease in pain to facilitate improvement in movement, function, and ADLs as indicated by Functional Deficits. [x] Progressing: [] Met: [] Not Met: [] Adjusted     Long Term Goals: To be achieved in: 6-8 weeks  1.  FOTO >/= 75/100 to assist with reaching prior level of function. [x] Progressing: [] Met: [] Not Met: [] Adjusted     2. Patient will demonstrate increased AROM to WNL to allow for proper joint functioning as indicated by patients Functional Deficits. [x] Progressing: [] Met: [] Not Met: [] Adjusted     3. Patient will demonstrate an increase in Strength to good proximal hip strength and control, to 5/5 in LE to allow for proper functional mobility as indicated by patients Functional Deficits. [x] Progressing: [] Met: [] Not Met: [] Adjusted     4. Patient will return to all functional activities without increased symptoms or restriction. [x] Progressing: [] Met: [] Not Met: [] Adjusted     5. Pt will exhibit normalized gait pattern. (patient specific functional goal)    [x] Progressing: [] Met: [] Not Met: [] Adjusted          Access Code: [email or web address]  URL: [email or web address]  Date: 07/18/2022  Prepared by: Elba Moore    Exercises  Supine Active Straight Leg Raise - 1 x daily - 7 x weekly - 2-3 sets - 10 reps  Supine Bridge - 1 x daily - 7 x weekly - 2-3 sets - 10 reps  Sidelying Hip Abduction - 1 x daily - 7 x weekly - 2-3 sets - 10 reps  Seated Hamstring Stretch - 1 x daily - 7 x weekly - 3 sets - 10 reps      Overall Progression Towards Functional goals/ Treatment Progress Update:  [] Patient is progressing as expected towards functional goals listed. [] Progression is slowed due to complexities/Impairments listed. [] Progression has been slowed due to co-morbidities.   [x] Plan just implemented, too soon to assess goals progression <30days   [] Goals require adjustment due to lack of progress  [] Patient is not progressing as expected and requires additional follow up with physician  [] Other    Prognosis for POC: [x] Good [] Fair  [] Poor      Patient requires continued skilled intervention: [x] Yes  [] No    Treatment/Activity Tolerance:  [x] Patient able to complete treatment  [] Patient limited by fatigue  [] Patient limited by pain    [] Patient limited by other medical complications  [] Other:                         PLAN: See eval  [x] Continue per plan of care [] Alter current plan (see comments above)  [] Plan of care initiated [] Hold pending MD visit [] Discharge    Electronically signed by:  Alphonse Herman PT    Note: If patient does not return for scheduled/ recommended follow up visits, this note will serve as a discharge from care along with most recent update on progress.

## 2022-08-15 ENCOUNTER — HOSPITAL ENCOUNTER (OUTPATIENT)
Dept: PHYSICAL THERAPY | Age: 52
Setting detail: THERAPIES SERIES
Discharge: HOME | End: 2022-08-15
Payer: COMMERCIAL

## 2022-08-15 PROCEDURE — 97110 THERAPEUTIC EXERCISES: CPT

## 2022-08-15 PROCEDURE — 97140 MANUAL THERAPY 1/> REGIONS: CPT

## 2022-08-15 NOTE — PLAN OF CARE
[facility]     Physical Therapy Re-Certification Plan of Care    Dear  JHOANA Durant,    We had the pleasure of treating the following patient for physical therapy services at [address] [place] [address]. A summary of our findings can be found in the updated assessment below. This includes our plan of care. If you have any questions or concerns regarding these findings, please do not hesitate to contact me at the office phone number checked above. Thank you for the referral.     Physician Signature:________________________________Date:__________________  By signing above (or electronic signature), therapists plan is approved by physician    Date Range Of Visits:  - 8/15  Total Visits to Date: 5  Overall Response to Treatment:   []Patient is responding well to treatment and improvement is noted with regards  to goals   []Patient should continue to improve in reasonable time if they continue HEP   []Patient has plateaued and is no longer responding to skilled PT intervention    [x]Patient is getting worse and would benefit from return to referring MD   []Patient unable to adhere to initial POC   [x]Other: Pt does report minimal increased pain since starting PT. He is compliant with HEP and exhibits normal ROM and strength gains, however pain has slightly increased. Pt reports he remembers this same course of symptoms with PT prior to surgery for the same diagnosis when he was 18years old. Pt to return to MD for further assessment.      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  Treatment Diagnosis: M25.562 L knee pain  Insurance/Certification information:   [facility]  Physician Information:   Shawnee Heath Reva De Guzman  Has the plan of care been signed (Y/N):        [x]  Yes  []  No     Date of Patient follow up with Physician: 22    Is this a Progress Report:     [x]  Yes  [x]  No      If Yes:  Date Range for reporting period:  Beginnin/18 ------------ Endin/15    Progress report will be due (10 Rx or 30 days whichever is less):        Recertification will be due (POC Duration  / 90 days whichever is less): 9/15        Visit # Insurance Allowable Auth Required   In Person 5 Check [place] []  Yes     []  No    Tele Health   []  Yes     []  No    Total 5       Functional Scale: FOTO: 55/100    Date assessed:  8/15/22      Latex Allergy:  [x]NO      []YES  Preferred Language for Healthcare:   [x]English       []other:    Pain level:  3.5-4/10     SUBJECTIVE:  Pt reports he did stop taking Meloxicam to see where his pain was without it. Pt notes ? pain with all movement. Pt states overall his pain is a little more irritating and more frequent since starting PT.     OBJECTIVE:   Observation: lateral tracking patellae  Test measurements:  8/15 L knee ROM 0-140 ° MMT L knee quad 4+/5 p!, flex 5/5     RESTRICTIONS/PRECAUTIONS: OA    Exercises/Interventions:   Therapeutic Ex (99392) Sets/sec Reps Notes/CUES HEP   bilat X D/C for home   Bridge + hip add iso 3 10  x   SL hip ABD  3 10 bilat x   HS S  HS S supine 30\"  20\" 3  5 Seated, bilat X  x   5\" 15 BlueTB    Gastroc S 30\" 5 bilat x   clamshell 3 10     Tried , did cause ? pain and flare up                          Discussed would stop PT should pain cont to ?      Manual Intervention (79377)       Rolling : quad, HS, gastroc x8'       Note improved hip IR after mobs                                NMR re-education (63244)   CUES NEEDED                                                                   Therapeutic Activity (47833)                                          [facility] access code: [email or web address]           Therapeutic Exercise and NMR EXR  [x] (37814) Provided function with self-care, mobility, lifting and ambulation. Modalities:  declined ice, will do at home   [] GAME READY (VASO)- for significant edema, swelling, pain control. Charges:  Timed Code Treatment Minutes: 30   Total Treatment Minutes:  30      [] EVAL (LOW) 76801 (typically 20 minutes face-to-face)  [] EVAL (MOD) 74981 (typically 30 minutes face-to-face)  [] EVAL (HIGH) 22718 (typically 45 minutes face-to-face)  [] RE-EVAL     [x] RC(89671) x   1  [] IONTO  [] NMR (62519) x     [] VASO  [x] Manual (27404) x    1 [] Other:  [] TA x      [] Mech Traction (16407)  [] ES(attended) (83806)      [] ES (un) (58706):    ASSESSMENT:  see above    GOALS:     Patient stated goal: none     Therapist goals for Patient:  Short Term Goals: To be achieved in: 2 weeks  1. Independent in HEP and progression per patient tolerance, in order to prevent re-injury. [] Progressing: [x] Met: [] Not Met: [] Adjusted     2. Patient will have a decrease in pain to facilitate improvement in movement, function, and ADLs as indicated by Functional Deficits. [x] Progressing: [] Met: [] Not Met: [] Adjusted     Long Term Goals: To be achieved in: 6-8 weeks  1. FOTO >/= 75/100 to assist with reaching prior level of function. [x] Progressing: [] Met: [] Not Met: [] Adjusted     2. Patient will demonstrate increased AROM to WNL to allow for proper joint functioning as indicated by patients Functional Deficits. [] Progressing: [x] Met: [] Not Met: [] Adjusted     3. Patient will demonstrate an increase in Strength to good proximal hip strength and control, to 5/5 in LE to allow for proper functional mobility as indicated by patients Functional Deficits. [x] Progressing: [] Met: [] Not Met: [] Adjusted     4. Patient will return to all functional activities without increased symptoms or restriction. [x] Progressing: [] Met: [] Not Met: [] Adjusted     5.  Pt will exhibit normalized gait pattern. (patient specific functional goal) [x] Progressing: [] Met: [] Not Met: [] Adjusted          Access Code: [email or web address]  URL: [email or web address]  Date: 07/18/2022  Prepared by: Jarrett Poplar    Exercises  Supine Active Straight Leg Raise - 1 x daily - 7 x weekly - 2-3 sets - 10 reps  Supine Bridge - 1 x daily - 7 x weekly - 2-3 sets - 10 reps  Sidelying Hip Abduction - 1 x daily - 7 x weekly - 2-3 sets - 10 reps  Seated Hamstring Stretch - 1 x daily - 7 x weekly - 3 sets - 10 reps      Overall Progression Towards Functional goals/ Treatment Progress Update:  [] Patient is progressing as expected towards functional goals listed. [] Progression is slowed due to complexities/Impairments listed. [] Progression has been slowed due to co-morbidities. [] Plan just implemented, too soon to assess goals progression <30days   [] Goals require adjustment due to lack of progress  [x] Patient is not progressing as expected and requires additional follow up with physician  [] Other    Prognosis for POC: [x] Good [] Fair  [] Poor      Patient requires continued skilled intervention: [x] Yes  [] No    Treatment/Activity Tolerance:  [x] Patient able to complete treatment  [] Patient limited by fatigue  [] Patient limited by pain    [] Patient limited by other medical complications  [] Other:                         PLAN: See eval  [] Continue per plan of care [] Alter current plan (see comments above)  [] Plan of care initiated [x] Hold pending MD visit [] Discharge    Electronically signed by:  Caitlyn Jeong PT    Note: If patient does not return for scheduled/ recommended follow up visits, this note will serve as a discharge from care along with most recent update on progress.

## 2022-08-16 ENCOUNTER — OFFICE VISIT (OUTPATIENT)
Dept: ORTHOPEDIC SURGERY | Age: 52
End: 2022-08-16
Payer: COMMERCIAL

## 2022-08-16 VITALS — HEIGHT: 71 IN | BODY MASS INDEX: 28 KG/M2 | WEIGHT: 200 LBS

## 2022-08-16 DIAGNOSIS — M22.42 CHONDROMALACIA OF LEFT PATELLA: ICD-10-CM

## 2022-08-16 DIAGNOSIS — M17.12 PRIMARY OSTEOARTHRITIS OF LEFT KNEE: Primary | ICD-10-CM

## 2022-08-16 PROCEDURE — 99214 OFFICE O/P EST MOD 30 MIN: CPT | Performed by: ORTHOPAEDIC SURGERY

## 2022-08-16 NOTE — PROGRESS NOTES
Chief Complaint  Follow-up (Left knee )      History of Present Illness:  Adina Costello is a pleasant 50 y.o. male here for follow-up regarding his left knee. As a review he has had left knee pain for several years with no associated injury. Pain is worse with walking for long periods of time. Patient does describe a history of bilateral knee arthroscopy back in the 80s for chondromalacia. MRI was reviewed showing chondral fissure and subchondral cyst of the trochlear notch. He has been taking Meloxicam and in PT with improvement in strength but no improvement in pain. Medical History:  Patient's medications, allergies, past medical, surgical, social and family histories were reviewed and updated as appropriate. Pain Assessment  Location of Pain: Knee  Location Modifiers: Left  Severity of Pain: 4  Quality of Pain: Dull, Aching, Sharp  Duration of Pain: Persistent  Frequency of Pain: Constant  Aggravating Factors: Stairs (general movement)  Limiting Behavior: Some  Relieving Factors: Rest  Result of Injury: No  Work-Related Injury: No  Are there other pain locations you wish to document?: No  ROS: Review of systems reviewed from Patient History Form completed today and available in the patient's chart under the Media tab. Pertinent items are noted in HPI  Review of systems reviewed from Patient History Form completed today and available in the patient's chart under the Media tab. Vital Signs:  Ht 5' 11\" (1.803 m)   Wt 200 lb (90.7 kg)   BMI 27.89 kg/m²       Left knee exam     Gait: No use of assistive devices. No antalgic gait. Alignment: normal alignment. Inspection/skin: Skin is intact without erythema or ecchymosis. No gross deformity. Palpation: no crepitus. Mild tenderness at the medial joint line tenderness present. Range of Motion: There is full range of motion. Strength: Normal quadriceps development. Effusion: No effusion or swelling present.      Ligamentous stability: No cruciate or collateral ligament instability. Neurologic and vascular: Skin is warm and well-perfused. Sensation is intact to light-touch. Special tests: Negative Gena sign. Right knee exam     Gait: No use of assistive devices. No antalgic gait. Alignment: normal alignment. Inspection/skin: Skin is intact without erythema or ecchymosis. No gross deformity. Palpation: no crepitus. no joint line tenderness present. Range of Motion: There is full range of motion. Strength: Normal quadriceps development. Effusion: No effusion or swelling present. Ligamentous stability: No cruciate or collateral ligament instability. Neurologic and vascular: Skin is warm and well-perfused. Sensation is intact to light-touch. Special tests: Negative Gena sign. Radiology:       Pertinent imaging was interpreted and reviewed with the patient today, both images and report. Left knee MRI on 6/30/2022      CONCLUSION:   1. 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 2. No meniscal, cruciate or ligamentous injuries. 3. Post-surgical infrapatellar - ligamentum mucosum plical scar. Thank you for the opportunity to provide your interpretation. 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 Primary osteoarthritis of left knee Yes    Chondromalacia of left patella        Office Procedures:  No orders of the defined types were placed in this encounter. Plan: Pertinent imaging was reviewed. The etiology, natural history, and treatment options for the disorder were discussed.   The roles of activity medication, antiinflammatories, injections, bracing, physical therapy, and surgical interventions were all described to the patient and questions were answered. Patient has a chondral fissure that Is causing mechanical symptoms. He has tried antiinflammatories and PT for 6 weeks with no improvement. Patient is currently undergoing a change in insurance. If his insurance changes and his plan allows it, I believe he is candidate for a lubricant injection. If no improvement then he would be a candidate for a left knee arthroscopy with chondroplasty of the trochlea and synovectomy. Bevely Cheadle is in agreement with this plan. All questions were answered to patient's satisfaction and was encouraged to call with any further questions. Total time spent for evaluation, education, and development of treatment plan: 39 minutes    Demetrio Cranker, [facility]  8/16/2022    During this exam, I, Demetrio Cranker, PA-C, functioned as a scribe for Dr. Johnny Langford. The history taking and physical examination were performed by Dr. Johnny Langford. All counseling during the appointment was performed between the patient and Dr. Johnny Langford. 8/16/2022 10:14 AM    This dictation was performed with a verbal recognition program (DRAGON) and it was checked for errors. It is possible that there are still dictated errors within this office note. If so, please bring any areas to my attention for an addendum. All efforts were made to ensure that this office note is accurate. I attest that I met personally with the patient, performed the described exam, reviewed the radiographic studies and medical records associated with this patient and supervised the services that are described above.      Master Barfield MD

## 2022-08-31 ENCOUNTER — TELEPHONE (OUTPATIENT)
Dept: ORTHOPEDIC SURGERY | Age: 52
End: 2022-08-31

## 2022-08-31 NOTE — TELEPHONE ENCOUNTER
Surgery and/or Procedure Scheduling     Contact Name: Brooke Heath Request: LEFT KNEE [address]  Patient Contact Number: [phone]    Patient calling to schedule SX

## 2022-09-07 ENCOUNTER — OFFICE VISIT (OUTPATIENT)
Dept: ORTHOPEDIC SURGERY | Age: 52
End: 2022-09-07
Payer: COMMERCIAL

## 2022-09-07 ENCOUNTER — TELEPHONE (OUTPATIENT)
Dept: ORTHOPEDIC SURGERY | Age: 52
End: 2022-09-07

## 2022-09-07 VITALS — HEIGHT: 71 IN | BODY MASS INDEX: 28 KG/M2 | WEIGHT: 200 LBS

## 2022-09-07 DIAGNOSIS — M17.12 PRIMARY OSTEOARTHRITIS OF LEFT KNEE: ICD-10-CM

## 2022-09-07 DIAGNOSIS — M22.42 CHONDROMALACIA OF LEFT PATELLA: Primary | ICD-10-CM

## 2022-09-07 PROCEDURE — 99214 OFFICE O/P EST MOD 30 MIN: CPT | Performed by: ORTHOPAEDIC SURGERY

## 2022-09-07 NOTE — PROGRESS NOTES
Place patient label inside box (if no patient label, complete below)  Name:  :  MR#:    Ollie Apgar / PROCEDURE  I (we), Jose Cruz Gates (Patient Name) authorize DR Benji Liriano MD  (Provider / Jimy [place]) and/or such assistants as may be selected by him/her, to perform the following operation/procedure(s): LEFT KNEE ARTHROSCOPY, CHONDROPLASTY, SYNOVECTOMY - LEFT       Note: If unable to obtain consent prior to an emergent procedure, document the emergent reason in the medical record. This procedure has been explained to my (our) satisfaction and included in the explanation was: The intended benefit, nature, and extent of the procedure to be performed; The significant risks involved and the probability of success; Alternative procedures and methods of treatment; The dangers and probable consequences of such alternatives (including no procedure or treatment); The expected consequences of the procedure on my future health; Whether other qualified individuals would be performing important surgical tasks and/or whether  would be present to advise or support the procedure. I (we) understand that there are other risks of infection and other serious complications in the pre-operative/procedural and postoperative/procedural stages of my (our) care. I (we) have asked all of the questions which I (we) thought were important in deciding whether or not to undergo treatment or diagnosis. These questions have been answered to my (our) satisfaction. I (we) understand that no assurance can be given that the procedure will be a success, and no guarantee or warranty of success has been given to me (us). It has been explained to me (us) that during the course of the operation/procedure, unforeseen conditions may be revealed that necessitate extension of the original procedure(s) or different procedure(s) than those set forth in Paragraph 1.  I (we) authorize and request that the above-named physician, his/her assistants or his/her designees, perform procedures as necessary and desirable if deemed to be in my (our) best interest.     Revised 8/2/2021                                                                          Page 1 of 2         I acknowledge that health care personnel may be observing this procedure for the purpose of medical education or other specified purposes as may be necessary as requested and/or approved by my (our) physician. I (we) consent to the disposal by the hospital Pathologist of the removed tissue, parts or organs in accordance with hospital policy. I do ____ do not ____ consent to the use of a local infiltration pain blocking agent that will be used by my provider/surgical provider to help alleviate pain during my procedure. I do ____ do not ____ consent to an emergent blood transfusion in the case of a life-threatening situation that requires blood components to be administered. This consent is valid for 24 hours from the beginning of the procedure. This patient does ____ or does not ____ currently have a DNR status/order. If DNR order is in place, obtain Addendum to the Surgical Consent for ALL Patients with a DNR Order to address alla-operative status for limited intervention or DNR suspension.      I have read and fully understand the above Consent for Operation/Procedure and that all blanks were completed before I signed the consent.   _____________________________       _____________________      ____/____am/pm  Signature of Patient or legal representative      Printed Name / Relationship            Date / Time   ____________________________       _____________________      ____/____am/pm  Witness to Signature                                    Printed Name                    Date / Time    If patient is unable to sign or is a minor, complete the following)  Patient is a minor, ____ years of age, or unable to sign because:   ______________________________________________________________________________________________    If a phone consent is obtained, consent will be documented by using two health care professionals, each affirming that the consenting party has no questions and gives consent for the procedure discussed with the physician/provider.   _____________________          ____________________       _____/_____am/pm   2nd witness to phone consent        Printed name           Date / Time    Informed Consent:  I have provided the explanation described above in section 1 to the patient and/or legal representative.  I have provided the patient and/or legal representative with an opportunity to ask any questions about the proposed operation/procedure.   ___________________________          ____________________         ____/____am/pm  Provider / Proceduralist                            Printed name            Date / Time  Revised 8/2/2021                                                                      Page 2 of 2

## 2022-09-07 NOTE — TELEPHONE ENCOUNTER
CPT: [address]: NPR  INSURANCE: BC  LOCATION [place]  NOTE: PER DAVINA [facility]-[phone]

## 2022-09-07 NOTE — PROGRESS NOTES
[facility] PRE-SURGICAL TESTING INSTRUCTIONS                      PRIOR TO PROCEDURE DATE:    1. PLEASE FOLLOW ANY INSTRUCTIONS GIVEN TO YOU PER YOUR SURGEON. 2. Arrange for someone to drive you home and be with you for the first 24 hours after discharge for your safety after your procedure for which you received sedation. Ensure it is someone we can share information with regarding your discharge. NOTE: At this time ONLY 2 ADULTS may accompany you & everyone must be MASKED. 3. You must contact your surgeon for instructions IF:  You are taking any blood thinners, aspirin, anti-inflammatory or vitamins. There is a change in your physical condition such as a cold, fever, rash, cuts, sores, or any other infection, especially near your surgical site. 4. Do not drink alcohol the day before or day of your procedure. Do not use any recreational marijuana at least 24 hours or street drugs (heroin, cocaine) at minimum 5 days prior to your procedure. 5. A Pre-Surgical History and Physical MUST be completed WITHIN 30 DAYS OR LESS prior to your procedure. by your Physician or an Urgent Care        THE DAY OF YOUR PROCEDURE:  1. Follow instructions for ARRIVAL TIME as DIRECTED BY YOUR SURGEON. 2. Enter the MAIN entrance from [address] and follow the signs to the free Parking Garage or [employer] (offered free of charge 7 am-5pm). 3. Enter the Main Entrance of the hospital (do not enter from the lower level of the parking garage). Upon entrance, check in with the  at the surgical information desk on your LEFT. Bring your insurance card and photo ID to register      4. DO NOT EAT ANYTHING 8 hours prior to arrival for surgery. You may have up to 8 ounces of water 4 hours prior to your arrival for surgery.    NOTE: ALL Gastric, Bariatric & Bowel surgery patients - you MUST follow your surgeon's instructions regarding eating/ drinking as you will have very specific instructions to follow. If you did not receive these, call your surgeon's office immediately. 5. MEDICATIONS:  Take the following medications with a SMALL sip of water: NONE ORDERED  Use your usual dose of inhalers the morning of surgery. BRING your rescue inhaler with you to hospital.   Anesthesia does NOT want you to take insulin the morning of surgery. They will control your blood sugar while you are at the hospital. Please contact your ordering physician for instructions regarding your insulin the night before your procedure. If you have an insulin pump, please keep it set on basal rate. Bariatric patient's call your surgeon if on diabetic medications as some may need to be stopped 1 week prior to surgery    6. Do not swallow additional water when brushing teeth. No gum, candy, mints, or ice chips. Refrain from smoking or at least decrease the amount on day of surgery. 7. Morning of surgery:   Take a shower with an antibacterial soap (i.e., Safeguard or Dial) OR your physician may have instructed you to use Hibiclens. Dress in loose, comfortable clothing appropriate for redressing after your procedure. Do not wear jewelry (including body piercings), make-up (especially NO eye make-up), fingernail polish (NO toenail polish if foot/leg surgery), lotion, powders, or metal hairclips. Do not shave or wax for 72 hours prior to procedure near your operative site. Shaving with a razor can irritate your skin and make it easier to develop an infection. On the day of your procedure, any hair that needs to be removed near the surgical site will be 'clipped' by a healthcare worker using a special clipper designed to avoid skin irritation. 8. Dentures, glasses, or contacts will need to be removed before your procedure. Bring cases for your glasses, contacts, dentures, or hearing aids to protect them while you are in surgery.       9. If you use a CPAP, please bring it with you on the day of your procedure. 10. We recommend that valuable personal belongings such as cash, cell phones, e-tablets, or jewelry, be left at home during your stay. The hospital will not be responsible for valuables that are not secured in the hospital safe. However, if your insurance requires a co-pay, you may want to bring a method of payment, i.e., Check or credit card, if you wish to pay your co-pay the day of surgery. 11. If you are to stay overnight, you may bring a bag with personal items. Please have any large items you may need brought in by your family after your arrival to your hospital room. 12. If you have a Living Will or Durable Power of , please bring a copy on the day of your procedure. How we keep you safe and work to prevent surgical site infections:   1. Health care workers should always check your ID bracelet to verify your name and birth date. You will be asked many times to state your name, date of birth, and allergies. 2. Health care workers should always clean their hands with soap or alcohol gel before providing care to you. It is okay to ask anyone if they cleaned their hands before they touch you. 3. You will be actively involved in verifying the type of procedure you are having and ensuring the correct surgical site. This will be confirmed multiple times prior to your procedure. Do NOT alonzo your surgery site UNLESS instructed to by your surgeon. 4. When you are in the operating room, your surgical site will be cleansed with a special soap, and in most cases, you will be given an antibiotic before the surgery begins. What to expect AFTER your procedure? 1. Immediately following your procedure, your will be taken to the PACU for the first phase of your recovery. Your nurse will help you recover from any potential side effects of anesthesia, such as extreme drowsiness, changes in your vital signs or breathing patterns.  Nausea, headache, muscle aches, or sore throat may also occur after anesthesia. Your nurse will help you manage these potential side effects. 2. For comfort and safety, arrange to have someone at home with you for the first 24 hours after discharge. 3. You and your family will be given written instructions about your diet, activity, dressing care, medications, and return visits. 4. Once at home, should issues with nausea, pain, or bleeding occur, or should you notice any signs of infection, you should call your surgeon. 5. Always clean your hands before and after caring for your wound. Do not let your family touch your surgery site without cleaning their hands. 6. Narcotic pain medications can cause significant constipation. You may want to add a stool softener to your postoperative medication schedule or speak to your surgeon on how best to manage this SIDE EFFECT. SPECIAL INSTRUCTIONS: BRING CRUTCHES    Thank you for allowing us to care for you. We strive to exceed your expectations in the delivery of care and service provided to you and your family. If you need to contact the [place] [address] staff for any reason, please call us at [phone]    Instructions reviewed with patient during preadmission testing phone interview.   Bre De Leon RN.9/7/2022 .4:06 PM      ADDITIONAL EDUCATIONAL INFORMATION REVIEWED PER PHONE WITH YOU AND/OR YOUR FAMILY:  Yes Hibiclens® Bathing Instructions   No Antibacterial Soap

## 2022-09-12 ENCOUNTER — ANESTHESIA EVENT (OUTPATIENT)
Dept: OPERATING ROOM | Age: 52
End: 2022-09-12
Payer: COMMERCIAL

## 2022-09-13 ENCOUNTER — ANESTHESIA (OUTPATIENT)
Dept: OPERATING ROOM | Age: 52
End: 2022-09-13
Payer: COMMERCIAL

## 2022-09-14 ENCOUNTER — APPOINTMENT (OUTPATIENT)
Dept: PHYSICAL THERAPY | Age: 52
End: 2022-09-14
Payer: COMMERCIAL

## 2022-09-15 ENCOUNTER — HOSPITAL ENCOUNTER (OUTPATIENT)
Age: 52
Setting detail: OUTPATIENT SURGERY
Discharge: HOME | End: 2022-09-15
Attending: ORTHOPAEDIC SURGERY | Admitting: ORTHOPAEDIC SURGERY
Payer: COMMERCIAL

## 2022-09-15 VITALS
HEIGHT: 71 IN | OXYGEN SATURATION: 97 % | RESPIRATION RATE: 16 BRPM | WEIGHT: 199.8 LBS | TEMPERATURE: 97.9 F | HEART RATE: 85 BPM | DIASTOLIC BLOOD PRESSURE: 83 MMHG | SYSTOLIC BLOOD PRESSURE: 126 MMHG | BODY MASS INDEX: 27.97 KG/M2

## 2022-09-15 DIAGNOSIS — M25.562 POSTOPERATIVE PAIN OF LEFT KNEE: Primary | ICD-10-CM

## 2022-09-15 DIAGNOSIS — G89.18 POSTOPERATIVE PAIN OF LEFT KNEE: Primary | ICD-10-CM

## 2022-09-15 PROCEDURE — 7100000010 HC PHASE II RECOVERY - FIRST 15 MIN: Performed by: ORTHOPAEDIC SURGERY

## 2022-09-15 PROCEDURE — 6360000002 HC RX W HCPCS: Performed by: ORTHOPAEDIC SURGERY

## 2022-09-15 PROCEDURE — 6370000000 HC RX 637 (ALT 250 FOR IP): Performed by: ANESTHESIOLOGY

## 2022-09-15 PROCEDURE — 6360000002 HC RX W HCPCS: Performed by: NURSE ANESTHETIST, CERTIFIED REGISTERED

## 2022-09-15 PROCEDURE — 2500000003 HC RX 250 WO HCPCS: Performed by: NURSE ANESTHETIST, CERTIFIED REGISTERED

## 2022-09-15 PROCEDURE — 3700000001 HC ADD 15 MINUTES (ANESTHESIA): Performed by: ORTHOPAEDIC SURGERY

## 2022-09-15 PROCEDURE — 7100000011 HC PHASE II RECOVERY - ADDTL 15 MIN: Performed by: ORTHOPAEDIC SURGERY

## 2022-09-15 PROCEDURE — 2709999900 HC NON-CHARGEABLE SUPPLY: Performed by: ORTHOPAEDIC SURGERY

## 2022-09-15 PROCEDURE — 7100000000 HC PACU RECOVERY - FIRST 15 MIN: Performed by: ORTHOPAEDIC SURGERY

## 2022-09-15 PROCEDURE — 2580000003 HC RX 258: Performed by: ANESTHESIOLOGY

## 2022-09-15 PROCEDURE — 2580000003 HC RX 258: Performed by: ORTHOPAEDIC SURGERY

## 2022-09-15 PROCEDURE — 3600000014 HC SURGERY LEVEL 4 ADDTL 15MIN: Performed by: ORTHOPAEDIC SURGERY

## 2022-09-15 PROCEDURE — 7100000001 HC PACU RECOVERY - ADDTL 15 MIN: Performed by: ORTHOPAEDIC SURGERY

## 2022-09-15 PROCEDURE — 3600000004 HC SURGERY LEVEL 4 BASE: Performed by: ORTHOPAEDIC SURGERY

## 2022-09-15 PROCEDURE — 2720000010 HC SURG SUPPLY STERILE: Performed by: ORTHOPAEDIC SURGERY

## 2022-09-15 PROCEDURE — 3700000000 HC ANESTHESIA ATTENDED CARE: Performed by: ORTHOPAEDIC SURGERY

## 2022-09-15 RX ORDER — FENTANYL CITRATE 50 UG/ML
INJECTION, SOLUTION INTRAMUSCULAR; INTRAVENOUS PRN
Status: DISCONTINUED | OUTPATIENT
Start: 2022-09-15 | End: 2022-09-15 | Stop reason: SDUPTHER

## 2022-09-15 RX ORDER — ROPIVACAINE HYDROCHLORIDE 5 MG/ML
INJECTION, SOLUTION EPIDURAL; INFILTRATION; PERINEURAL PRN
Status: DISCONTINUED | OUTPATIENT
Start: 2022-09-15 | End: 2022-09-15 | Stop reason: ALTCHOICE

## 2022-09-15 RX ORDER — MIDAZOLAM HYDROCHLORIDE 1 MG/ML
INJECTION INTRAMUSCULAR; INTRAVENOUS PRN
Status: DISCONTINUED | OUTPATIENT
Start: 2022-09-15 | End: 2022-09-15 | Stop reason: SDUPTHER

## 2022-09-15 RX ORDER — OXYCODONE HYDROCHLORIDE AND ACETAMINOPHEN 5; 325 MG/1; MG/1
1 TABLET ORAL EVERY 6 HOURS PRN
Qty: 28 TABLET | Refills: 0 | Status: SHIPPED | OUTPATIENT
Start: 2022-09-15 | End: 2022-09-22

## 2022-09-15 RX ORDER — SODIUM CHLORIDE, SODIUM LACTATE, POTASSIUM CHLORIDE, CALCIUM CHLORIDE 600; 310; 30; 20 MG/100ML; MG/100ML; MG/100ML; MG/100ML
INJECTION, SOLUTION INTRAVENOUS CONTINUOUS
Status: DISCONTINUED | OUTPATIENT
Start: 2022-09-15 | End: 2022-09-15 | Stop reason: HOSPADM

## 2022-09-15 RX ORDER — LIDOCAINE HYDROCHLORIDE 20 MG/ML
INJECTION, SOLUTION INTRAVENOUS PRN
Status: DISCONTINUED | OUTPATIENT
Start: 2022-09-15 | End: 2022-09-15 | Stop reason: SDUPTHER

## 2022-09-15 RX ORDER — ONDANSETRON 2 MG/ML
INJECTION INTRAMUSCULAR; INTRAVENOUS PRN
Status: DISCONTINUED | OUTPATIENT
Start: 2022-09-15 | End: 2022-09-15 | Stop reason: SDUPTHER

## 2022-09-15 RX ORDER — LABETALOL HYDROCHLORIDE 5 MG/ML
10 INJECTION, SOLUTION INTRAVENOUS
Status: DISCONTINUED | OUTPATIENT
Start: 2022-09-15 | End: 2022-09-15 | Stop reason: HOSPADM

## 2022-09-15 RX ORDER — ONDANSETRON 4 MG/1
4 TABLET, ORALLY DISINTEGRATING ORAL 3 TIMES DAILY PRN
Qty: 21 TABLET | Refills: 0 | Status: SHIPPED | OUTPATIENT
Start: 2022-09-15

## 2022-09-15 RX ORDER — OXYCODONE HYDROCHLORIDE 5 MG/1
10 TABLET ORAL PRN
Status: COMPLETED | OUTPATIENT
Start: 2022-09-15 | End: 2022-09-15

## 2022-09-15 RX ORDER — OXYCODONE HYDROCHLORIDE 5 MG/1
5 TABLET ORAL PRN
Status: COMPLETED | OUTPATIENT
Start: 2022-09-15 | End: 2022-09-15

## 2022-09-15 RX ORDER — DIPHENHYDRAMINE HYDROCHLORIDE 50 MG/ML
12.5 INJECTION INTRAMUSCULAR; INTRAVENOUS
Status: DISCONTINUED | OUTPATIENT
Start: 2022-09-15 | End: 2022-09-15 | Stop reason: HOSPADM

## 2022-09-15 RX ORDER — DEXAMETHASONE SODIUM PHOSPHATE 4 MG/ML
INJECTION, SOLUTION INTRA-ARTICULAR; INTRALESIONAL; INTRAMUSCULAR; INTRAVENOUS; SOFT TISSUE PRN
Status: DISCONTINUED | OUTPATIENT
Start: 2022-09-15 | End: 2022-09-15 | Stop reason: SDUPTHER

## 2022-09-15 RX ORDER — ASPIRIN 325 MG
325 TABLET, DELAYED RELEASE (ENTERIC COATED) ORAL DAILY
Qty: 14 TABLET | Refills: 0 | Status: SHIPPED | OUTPATIENT
Start: 2022-09-15 | End: 2023-09-15

## 2022-09-15 RX ORDER — HYDRALAZINE HYDROCHLORIDE 20 MG/ML
10 INJECTION INTRAMUSCULAR; INTRAVENOUS
Status: DISCONTINUED | OUTPATIENT
Start: 2022-09-15 | End: 2022-09-15 | Stop reason: HOSPADM

## 2022-09-15 RX ORDER — PROPOFOL 10 MG/ML
INJECTION, EMULSION INTRAVENOUS PRN
Status: DISCONTINUED | OUTPATIENT
Start: 2022-09-15 | End: 2022-09-15 | Stop reason: SDUPTHER

## 2022-09-15 RX ORDER — MEPERIDINE HYDROCHLORIDE 25 MG/ML
12.5 INJECTION INTRAMUSCULAR; INTRAVENOUS; SUBCUTANEOUS EVERY 5 MIN PRN
Status: DISCONTINUED | OUTPATIENT
Start: 2022-09-15 | End: 2022-09-15 | Stop reason: HOSPADM

## 2022-09-15 RX ORDER — SODIUM CHLORIDE 0.9 % (FLUSH) 0.9 %
5-40 SYRINGE (ML) INJECTION EVERY 12 HOURS SCHEDULED
Status: DISCONTINUED | OUTPATIENT
Start: 2022-09-15 | End: 2022-09-15 | Stop reason: HOSPADM

## 2022-09-15 RX ORDER — SODIUM CHLORIDE, SODIUM LACTATE, POTASSIUM CHLORIDE, AND CALCIUM CHLORIDE .6; .31; .03; .02 G/100ML; G/100ML; G/100ML; G/100ML
IRRIGANT IRRIGATION PRN
Status: DISCONTINUED | OUTPATIENT
Start: 2022-09-15 | End: 2022-09-15 | Stop reason: HOSPADM

## 2022-09-15 RX ORDER — SODIUM CHLORIDE 9 MG/ML
25 INJECTION, SOLUTION INTRAVENOUS PRN
Status: DISCONTINUED | OUTPATIENT
Start: 2022-09-15 | End: 2022-09-15 | Stop reason: HOSPADM

## 2022-09-15 RX ORDER — SODIUM CHLORIDE 0.9 % (FLUSH) 0.9 %
5-40 SYRINGE (ML) INJECTION PRN
Status: DISCONTINUED | OUTPATIENT
Start: 2022-09-15 | End: 2022-09-15 | Stop reason: HOSPADM

## 2022-09-15 RX ORDER — ROCURONIUM BROMIDE 10 MG/ML
INJECTION, SOLUTION INTRAVENOUS PRN
Status: DISCONTINUED | OUTPATIENT
Start: 2022-09-15 | End: 2022-09-15 | Stop reason: SDUPTHER

## 2022-09-15 RX ORDER — METOCLOPRAMIDE HYDROCHLORIDE 5 MG/ML
10 INJECTION INTRAMUSCULAR; INTRAVENOUS
Status: DISCONTINUED | OUTPATIENT
Start: 2022-09-15 | End: 2022-09-15 | Stop reason: HOSPADM

## 2022-09-15 RX ORDER — SUCCINYLCHOLINE/SOD CL,ISO/PF 200MG/10ML
SYRINGE (ML) INTRAVENOUS PRN
Status: DISCONTINUED | OUTPATIENT
Start: 2022-09-15 | End: 2022-09-15 | Stop reason: SDUPTHER

## 2022-09-15 RX ORDER — DOCUSATE SODIUM 100 MG/1
100 CAPSULE, LIQUID FILLED ORAL 2 TIMES DAILY
Qty: 60 CAPSULE | Refills: 0 | Status: SHIPPED | OUTPATIENT
Start: 2022-09-15 | End: 2022-10-15

## 2022-09-15 RX ADMIN — DEXAMETHASONE SODIUM PHOSPHATE 4 MG: 4 INJECTION, SOLUTION INTRAMUSCULAR; INTRAVENOUS at 07:40

## 2022-09-15 RX ADMIN — OXYCODONE 5 MG: 5 TABLET ORAL at 09:31

## 2022-09-15 RX ADMIN — PHENYLEPHRINE HYDROCHLORIDE 100 MCG: 10 INJECTION, SOLUTION INTRAMUSCULAR; INTRAVENOUS; SUBCUTANEOUS at 07:41

## 2022-09-15 RX ADMIN — PROPOFOL 200 MG: 10 INJECTION, EMULSION INTRAVENOUS at 07:32

## 2022-09-15 RX ADMIN — MIDAZOLAM HYDROCHLORIDE 2 MG: 2 INJECTION, SOLUTION INTRAMUSCULAR; INTRAVENOUS at 07:24

## 2022-09-15 RX ADMIN — PHENYLEPHRINE HYDROCHLORIDE 100 MCG: 10 INJECTION, SOLUTION INTRAMUSCULAR; INTRAVENOUS; SUBCUTANEOUS at 08:07

## 2022-09-15 RX ADMIN — SUGAMMADEX 100 MG: 100 INJECTION, SOLUTION INTRAVENOUS at 08:24

## 2022-09-15 RX ADMIN — LIDOCAINE HYDROCHLORIDE 100 MG: 20 INJECTION, SOLUTION INTRAVENOUS at 07:32

## 2022-09-15 RX ADMIN — ROCURONIUM BROMIDE 50 MG: 10 INJECTION INTRAVENOUS at 07:38

## 2022-09-15 RX ADMIN — Medication 140 MG: at 07:32

## 2022-09-15 RX ADMIN — SUGAMMADEX 200 MG: 100 INJECTION, SOLUTION INTRAVENOUS at 08:15

## 2022-09-15 RX ADMIN — SODIUM CHLORIDE, POTASSIUM CHLORIDE, SODIUM LACTATE AND CALCIUM CHLORIDE: 600; 310; 30; 20 INJECTION, SOLUTION INTRAVENOUS at 06:47

## 2022-09-15 RX ADMIN — FENTANYL CITRATE 50 MCG: 50 INJECTION, SOLUTION INTRAMUSCULAR; INTRAVENOUS at 07:32

## 2022-09-15 RX ADMIN — ONDANSETRON 4 MG: 2 INJECTION INTRAMUSCULAR; INTRAVENOUS at 07:40

## 2022-09-15 RX ADMIN — CEFAZOLIN 2000 MG: 2 INJECTION, POWDER, FOR SOLUTION INTRAMUSCULAR; INTRAVENOUS at 07:38

## 2022-09-15 ASSESSMENT — PAIN - FUNCTIONAL ASSESSMENT
PAIN_FUNCTIONAL_ASSESSMENT: ACTIVITIES ARE NOT PREVENTED
PAIN_FUNCTIONAL_ASSESSMENT: 0-10

## 2022-09-15 ASSESSMENT — PAIN SCALES - GENERAL
PAINLEVEL_OUTOF10: 4
PAINLEVEL_OUTOF10: 0
PAINLEVEL_OUTOF10: 0
PAINLEVEL_OUTOF10: 4

## 2022-09-15 ASSESSMENT — PAIN DESCRIPTION - LOCATION: LOCATION: KNEE

## 2022-09-15 ASSESSMENT — PAIN DESCRIPTION - ORIENTATION: ORIENTATION: LEFT

## 2022-09-15 ASSESSMENT — PAIN DESCRIPTION - DESCRIPTORS
DESCRIPTORS: ACHING;DULL
DESCRIPTORS: ACHING

## 2022-09-15 NOTE — BRIEF OP NOTE
Brief Postoperative Note      Patient: Blaise Liu  YOB: 1970  MRN: [id number]    Date of Procedure: 9/15/2022    Pre-Op Diagnosis: Primary osteoarthritis of left knee [M17.12]    Post-Op Diagnosis: Same       Procedure(s):  LEFT KNEE ARTHROSCOPY, CHONDROPLASTY, SYNOVECTOMY    Surgeon(s):   Tree Zavala MD    Assistant:  Surgical Assistant: Lynnette Dent  Fellow: Celestine Brambila DO    Anesthesia: General    Estimated Blood Loss (mL): Minimal    Complications: None    Specimens:   * No specimens in log *    Implants:  * No implants in log *      Drains: * No LDAs found *    Findings: Refer to operative report    Electronically signed by Celestine Brambila DO on 9/15/2022 at 8:19 AM

## 2022-09-15 NOTE — DISCHARGE INSTRUCTIONS
Arash Ponce MD     KNEE POST-OPERATIVE INSTRUCTIONS    DRESSING/WOUND CARE    Your incisions have been closed with absorbable sutures which will not need to be removed. In addition, they have been covered with Dermabond, which will shield them from water. You will be instructed at your first postoperative visit when you may shower. Your dressing includes gauze sponges immediately adjacent to the skin which are held in place with a layer of sterile cotton padding. Your leg has also been wrapped in an Ace bandage to provide compression and help to prevent swelling. Your dressing will be removed at your first physical therapy visit. You may remove and rewrap the Ace bandage if it feels uncomfortable or too tight. Some bleeding may be on the dressing after surgery. Call if the stain increases to more than 2 inches in diameter. KNEE IMMOBILIZER    Depending on your surgery, you may also have an immobilizer brace on your leg. This is primarily for your comfort. The straps may be loosened, but the brace must be worn when you are sleeping or walking. It may be removed by your physical therapist when you have good     CRUTCHES/JOINT MOVEMENT     Weight-bear as tolerated until you can walk without a limp and You are encouraged to move your knee as much as is comfortable    STRAIGHT LEG RAISES    Perform 10 times every 30 minutes while awake. When you begin these exercises, it is normal to feel pain in the front of your knee. You are not causing any damage to the joint by doing these exercises. He will avoid losing muscle mass in your thigh and will hasten your recovery. The more straight leg raises you can perform, the easier and more comfortable they will be. CRYOCUFF ICEMAN COLD THERAPY UNIT/ICE PACK    Many patients have found that the use of the controlled cold therapy system offers improved recovery and rehab following surgery.   The devices used immediately after surgery to reduce pain and swelling. Most patients use the unit for several weeks following surgery. It can be used in conjunction with physical therapy, work, and exercising. The instructions are included with the device. You may use it 20 minutes out of every hour while you are awake. Remove if it is uncomfortable. You may apply ice packs to the shoulder area 20 minutes out of every hour while you are awake if not using the iceman unit. Remove if it is uncomfortable. NERVE BLOCK    At the hospital, prior to your procedure, you have the option of receiving a nerve block of the knee. This would then make your knee numb for up to 1 day and it may take a few days for your full sensation to return. ASPIRIN-325 MG-START AFTER SURGERY  Take 1 aspirin daily until you are able to walk normally. Aspirin is a mild blood thinner and is given to help prevent blood clots following surgery  ** DO NOT TAKE ASPIRIN IF YOU HAVE AN ALLERGY TO THIS MEDICATION OR IF YOU HAVE A HISTORY OF ANY BLEEDING DISORDER**    JENNIFER HOSE    White stockings have been provided for you to wear on the on operative leg to maintain blood flow and help avoid blood clots. You may discontinue use of the stockings when you are walking normally on the uninjured side. After the Ace bandage and dressing has been removed from your operated side, you can substitute the stocking for the Ace bandage when you are up and walking    BATHING    Keep your dressing dry. You may shower 5 days after your surgery. Avoid scrubbing incisions for 2 full weeks following her surgery. PAIN MEDICATION     Percocet 5/325m-2 tablets every 4-6 hours as needed for pain    You may take Motrin Advil or ibuprofen 1 to 2 tablets every 4-6 hours in addition to the above medication as needed. ** IF YOU ARE TAKING TORADOL, DO NOT TAKE MOTRIN, ADVIL, OR IBUPROFEN**  **DO NOT TAKE EXTRA TYLENOL WHILE TAKING PERCOCET, NORCO, OR VICODIN. THESE MEDICATIONS ALSO CONTAIN TYLENOL.  TAKING ADDITIONAL TYLENOL CAN CAUSE LIVER DAMAGE**    The pain medication may make you drowsy. Do not drink alcohol while taking pain medication. Do not operate a motor vehicle while taking pain medication. Nausea is a common side effect of narcotic pain medication. This can sometimes be helped by taking pain medication with food. You should drink plenty of water while taking pain medication. You should decrease the amount of medication you are taking each day following surgery. If your pain is not controlled by your pain medications or increases in severity after the first postoperative visit, please contact our office. CALL THE OFFICE IF ANY OF THE FOLLOWING OCCUR    TEMPERATURE ABOVE 101  DEVELOPMENT OF NEW NUMBNESS OR TINGLING  UNCONTROLLED NAUSEA OR VOMITING  EXCESSIVE BLEEDING  INCREASED PAIN  INABILITY TO URINATE    Jose Rivas, Mil Diallo PA-C, and a Surgical Fellow are the providers that will be involved in your care. If you have any questions, comments, or concerns please contact Robyn Gonzales, our , by calling [phone], or by emailing her at [email or web address] [address] [facility]    There are potential side effects of anesthesia or sedation you may experience for the first 24 hours. These side effects include:    Confusion or Memory loss, Dizziness, or Delayed Reaction Times   [x]A responsible person should be with you for the next 24 hours. Do not operate any vehicles (automobiles, bicycles, motorcycles) or power tools or machinery for 24 hours. Do not sign any legal documents or make any legal decisions for 24 hours. Do not drink alcohol for 24 hours or while taking narcotic pain medication. Nausea    [x]Start with light diet and progress to your normal diet as you feel like eating. However, if you experience nausea or repeated episodes of vomiting which persist beyond 12-24 hours, notify your physician.   Once nausea has passed, remember to keep drinking fluids. Difficulty Passing Urine  [x]Drink extra amounts of fluid today. Notify your physician if you have not urinated within 8 hours after your procedure or you feel uncomfortable. Irritated Throat from a Breathing Tube  [x]Drink extra amounts of fluid today. Lozenges may help. Muscle Aches  [x]You may experience some generalized body aches as your muscles recover from medications used to relax them during surgery. These will gradually subside. MEDICATION INSTRUCTIONS:  [x]Prescription(S) x   4  sent with you. Use as directed. When taking pain medications, you may experience the side effect of dizziness or drowsiness. Do not drink alcohol or drive when taking these medications. []Prescription(S) x          Called to Pharmacy Name and location:    [x]Give the list of your medications to your primary care physician on your next visit. Keep your med list updated and carry it with in case of emergencies. [x] Narcotic pain medications can cause the side effect of significant constipation. You may want to add a stool softener to your postoperative medication schedule or speak to your surgeon on how best to manage this side effect. NARCOTIC SAFETY:  Your pain medicine is only for you to take. Safely store your medicines. Store pills up high and out of reach of children and pets. Ensure safety caps are snapped tightly  Keep track of how many pills you have left    Unused medication can be disposed of by taking them to a drop-off box or take-back program that is authorized by the [facility]. Access to a site near you can be found on the [facility] website ([address]. [facility].gov). If you have a CPAP machine, it is very important that you use it daily during all periods of sleep and daytime rest during your recovery at home. Surgery and Anesthesia place a significant amount of stress on your body.   Using your CPAP will help keep you safe and lessen the negative effects of that stress. FOLLOW-UP RECOVERY CARE:  [x]Call the office at [phone] for any problems. ( you have a follow-up appointment tomorrow, Friday the 16th September )    Watch for these possible complications, symptoms, or side effects of anesthesia. Call physician if they or any other problems occur:  Signs of INFECTION   > Fever over 101°     > Redness, swelling, hardness or warmth at the operative site   >Foul smelling or cloudy drainage at the operative site   Unrelieved PAIN  Unrelieved NAUSEA  Blood soaked dressing. (Some oozing may be normal)  Inability to urinate      Numb, pale, blue, cold or tingling extremity      Physician:  Dr. Evan Villafuerte    The above instructions were reviewed with patient/significant other. The following additional patient specific information was reviewed with the patient/significant other:  [x]Procedure/physician specific instructions  [x]Medication information sheet(S) including potential side effects  []Bellas egress test  []Pain Ball management  []FAQ Catheter associated blood stream infections  []FAQ Surgical Site Infections  []Other-    I have read and understand the instructions given to me: ____________________________________________   (Patient/S.O. Signature)            Date/time 9/15/2022 9:03 AM         PACU:  [phone]   M-F 700 AM - 7 PM      SAME DAY SERVICES:  [phone] M-F 7AM-6PM        If you smoke STOP. We care about your health!

## 2022-09-15 NOTE — ANESTHESIA POSTPROCEDURE EVALUATION
Department of Anesthesiology  Postprocedure Note    Patient: Shantel Guerrier  MRN: [id number]  YOB: 1970  Date of evaluation: 9/15/2022      Procedure Summary     Date: 09/15/22 Room / Location: [address] [place] [address] [address] [place] [address] / [facility]    Anesthesia Start: 3333 Anesthesia Stop: 0458    Procedure: LEFT KNEE ARTHROSCOPY, CHONDROPLASTY, SYNOVECTOMY (Left) Diagnosis:       Primary osteoarthritis of left knee      (Primary osteoarthritis of left knee [M17.12])    Surgeons:  Virlinda Boast, MD Responsible Provider: All Yates MD    Anesthesia Type: General ASA Status: 2          Anesthesia Type: General    Blossom Phase I: Blossom Score: 10    Blossom Phase II: Blossom Score: 10      Anesthesia Post Evaluation    Patient location during evaluation: PACU  Patient participation: complete - patient participated  Level of consciousness: awake and alert  Pain score: 4  Airway patency: patent  Nausea & Vomiting: no nausea and no vomiting  Complications: no  Cardiovascular status: hemodynamically stable  Respiratory status: acceptable  Hydration status: euvolemic

## 2022-09-15 NOTE — PROGRESS NOTES
Ambulatory Surgery/Procedure Discharge Note    Vitals:    09/15/22 0959   BP: 126/83   Pulse: 85   Resp: 16   Temp: 97.9 °F (36.6 °C)   SpO2: 97%   Pt meets discharge criteria per Blossom score. In: 7030 [P.O.:350; I.V.:825]  Out: 5     Restroom use offered before discharge. Yes    Pain assessment:  none  Pain Level: 4    Pt and S.O./family states \"ready to go home\". Pt alert and oriented x4. IV removed. Denies N/V or pain. Left leg ACE bandage c/d/I. Voided prior to discharge. Discharge instructions given to pt and wife with pt permission. Pt and wife verbalized understanding of all instructions. Left with all belongings,4 prescriptions, and discharge instructions. Patient discharged to home/self care.  Patient discharged via wheel chair by transporter to waiting family/S.O.       9/15/2022 10:28 AM

## 2022-09-15 NOTE — PROGRESS NOTES
From OR sedated, dressing CDI, VSS. Report from CRNA.     S/P LEFT KNEE ARTHROSCOPY, CHONDROPLASTY, SYNOVECTOMY - Left

## 2022-09-15 NOTE — OP NOTE
[facility]                [address]                                OPERATIVE REPORT    PATIENT NAME: Susana Evans                     :        1970  MED REC NO:   [id number]                          ROOM:  ACCOUNT NO:   [de-identified]                           ADMIT DATE: 09/15/2022  PROVIDER:     Jose Cruz Gordon MD    DATE OF PROCEDURE:  09/15/2022    OPERATIONS:  Left knee arthroscopy with chondroplasty; patellar  chondroplasty; trochlea; major synovectomy, medial, lateral, and  anterior compartments. SURGEON:  Jose Cruz Gordon MD    ASSISTANT:  Karen Mata MD    ANESTHESIA:  General.    PREOPERATIVE DIAGNOSES:  Cartilage defect, patellar cartilage defect,  trochlea, synovitis. POSTOPERATIVE DIAGNOSES:  Cartilage defect, patellar cartilage defect,  trochlea, synovitis. PREPARATION:  ChloraPrep. INDICATIONS:  This is a 61-year-old male with painful anterior knee  catching associated with giving way episodes because of pain. He has  had therapy, anti-inflammatory medications without relief. He presents  at this time for arthroscopic evaluation and treatment. Risks and  benefits of surgery as well as nonsurgical alternatives were discussed  in detail with the patient who understood and consented to the  operation. DESCRIPTION OF PROCEDURE:  I saw the patient in the holding area. He  confirmed the left knee was the operative extremity. I initialed the  operative site. He was taken to the OR and after induction of general  anesthesia, a tourniquet was placed in the left thigh. Left leg was  prepped and draped in the sterile fashion. Timeout was performed. The  OR team agreed the left knee was the operative site. The leg was  exsanguinated with an Esmarch bandage. Tourniquet was inflated to 275  mmHg. Incision was made. Scope was placed in the joint and knee was  visualized sequentially.   Fibrillation of the

## 2022-09-15 NOTE — ANESTHESIA PRE PROCEDURE
Department of Anesthesiology  Preprocedure Note       Name:  Ankit Landrum   Age:  50 y.o.  :  1970                                          MRN:  [id number]         Date:  9/15/2022      Surgeon: Juanita Ceron): Kat Herman MD    Procedure: Procedure(s):  LEFT KNEE ARTHROSCOPY, CHONDROPLASTY, SYNOVECTOMY    Medications prior to admission:   Prior to Admission medications    Medication Sig Start Date End Date Taking? Authorizing Provider   Multiple Vitamins-Minerals (ONE-A-DAY MENS VITACRAVES PO)     Historical Provider, MD   famotidine (PEPCID) 40 MG tablet Take 40 mg by mouth daily 22   Historical Provider, MD   albuterol sulfate HFA (PROVENTIL;VENTOLIN;PROAIR) 108 (90 Base) MCG/ACT inhaler Inhale 2 puffs into the lungs every 4 hours as needed 22   Historical Provider, MD   meloxicam (MOBIC) 15 MG tablet Take 1 tablet by mouth daily 22   JHOANA Davila       Current medications:    No current facility-administered medications for this encounter. Allergies:  No Known Allergies    Problem List:  There is no problem list on file for this patient.       Past Medical History:        Diagnosis Date    Asthma     Heartburn     Osteoarthritis of left knee        Past Surgical History:        Procedure Laterality Date    APPENDECTOMY      KNEE ARTHROSCOPY Bilateral     LASIK      LIPOMA RESECTION  2019    LEFT HIP & LEFT BACK SHOULDER    SINUS SURGERY  2005    TONSILLECTOMY  1980    VASECTOMY  2007       Social History:    Social History     Tobacco Use    Smoking status: Never    Smokeless tobacco: Never   Substance Use Topics    Alcohol use: Yes     Comment: SOCIALLY                                Counseling given: Not Answered      Vital Signs (Current):   Vitals:    22 1614   Weight: 200 lb (90.7 kg)   Height: 5' 11\" (1.803 m)                                              BP Readings from Last 3 Encounters:   No data found for BP       NPO Status: BMI:   Wt Readings from Last 3 Encounters:   09/07/22 200 lb (90.7 kg)   09/07/22 200 lb (90.7 kg)   08/16/22 200 lb (90.7 kg)     Body mass index is 27.89 kg/m². CBC: No results found for: WBC, RBC, HGB, HCT, MCV, RDW, PLT    CMP: No results found for: NA, K, CL, CO2, BUN, CREATININE, GFRAA, AGRATIO, LABGLOM, GLUCOSE, GLU, PROT, CALCIUM, BILITOT, ALKPHOS, AST, ALT    POC Tests: No results for input(s): POCGLU, POCNA, POCK, POCCL, POCBUN, POCHEMO, POCHCT in the last 72 hours. Coags: No results found for: PROTIME, INR, APTT    HCG (If Applicable): No results found for: PREGTESTUR, PREGSERUM, HCG, HCGQUANT     ABGs: No results found for: PHART, PO2ART, UPN0KUN, FQY9SVM, BEART, S7MLUIIJ     Type & Screen (If Applicable):  No results found for: LABABO, LABRH    Drug/Infectious Status (If Applicable):  No results found for: HIV, HEPCAB    COVID-19 Screening (If Applicable): No results found for: COVID19        Anesthesia Evaluation  Patient summary reviewed and Nursing notes reviewed no history of anesthetic complications:   Airway: Mallampati: II  TM distance: >3 FB   Neck ROM: full  Mouth opening: > = 3 FB   Dental:          Pulmonary:   (+) asthma:                            Cardiovascular:Negative CV ROS                      Neuro/Psych:   Negative Neuro/Psych ROS              GI/Hepatic/Renal: Neg GI/Hepatic/Renal ROS            Endo/Other: Negative Endo/Other ROS                    Abdominal:             Vascular: negative vascular ROS. Other Findings:           Anesthesia Plan      general     ASA 3    (68-year-old male presents for LEFT KNEE ARTHROSCOPY, CHONDROPLASTY, SYNOVECTOMY. Plan general anesthesia with ASA standard monitors. Questions answered. Patient agreeable with anesthetic plan.  )  Induction: intravenous. Anesthetic plan and risks discussed with patient. Plan discussed with CRNA.     Attending anesthesiologist reviewed and agrees with Preprocedure content          Edwige Granados MD   9/15/2022

## 2022-09-16 ENCOUNTER — OFFICE VISIT (OUTPATIENT)
Dept: ORTHOPEDIC SURGERY | Age: 52
End: 2022-09-16

## 2022-09-16 ENCOUNTER — HOSPITAL ENCOUNTER (OUTPATIENT)
Dept: PHYSICAL THERAPY | Age: 52
Setting detail: THERAPIES SERIES
Discharge: HOME | End: 2022-09-16
Payer: COMMERCIAL

## 2022-09-16 DIAGNOSIS — M17.12 OSTEOARTHRITIS OF LEFT PATELLOFEMORAL JOINT: Primary | ICD-10-CM

## 2022-09-16 PROCEDURE — 99024 POSTOP FOLLOW-UP VISIT: CPT | Performed by: ORTHOPAEDIC SURGERY

## 2022-09-16 PROCEDURE — 97016 VASOPNEUMATIC DEVICE THERAPY: CPT | Performed by: PHYSICAL THERAPIST

## 2022-09-16 PROCEDURE — 97161 PT EVAL LOW COMPLEX 20 MIN: CPT | Performed by: PHYSICAL THERAPIST

## 2022-09-16 PROCEDURE — 97110 THERAPEUTIC EXERCISES: CPT | Performed by: PHYSICAL THERAPIST

## 2022-09-16 NOTE — PLAN OF CARE
[facility] [address]  Phone [phone]  Fax [phone]                                                       Physical Therapy Certification    Dear Wander Pickering  ,    We had the pleasure of evaluating the following patient for physical therapy services at [address] [place] [address]. A summary of our findings can be found in the initial assessment below. This includes our plan of care. If you have any questions or concerns regarding these findings, please do not hesitate to contact me at the office phone number checked above. Thank you for the referral.       Physician Signature:_______________________________Date:__________________  By signing above (or electronic signature), therapists plan is approved by physician      Patient: Gaurang Roger   : 1970   MRN: [id number]  Referring Physician:  Wander Pickering      Evaluation Date: 2022      Medical Diagnosis Information:  Diagnosis: Primary osteoarthritis of left knee (M17.12)   Treatment Diagnosis: L knee painM25.562               DATE OF PROCEDURE:  09/15/2022     OPERATIONS:  Left knee arthroscopy with chondroplasty; patellar  chondroplasty; trochlea; major synovectomy, medial, lateral, and  anterior compartments. Insurance information: PT Insurance Information: BCBS     Precautions/ Contra-indications:     C-SSRS Triggered by Intake questionnaire (Past 2 wk assessment):   [x] No, Questionnaire did not trigger screening.   [] Yes, Patient intake triggered further evaluation      [] C-SSRS Screening completed  [] PCP notified via Plan of Care  [] Emergency services notified     Latex Allergy:  [x]NO      []YES  Preferred Language for Healthcare:   [x]English       []other:    SUBJECTIVE: Patient stated complaint: here for post op visit.   Doing well     Relevant Medical History:unremarkable   Functional Disability Index: Foto 56/100    Pain Scale: 1-2/10  Easing factors: pain meds ice   Provocative factors: walking ROM      Type: [x]Constant   []Intermittent  []Radiating []Localized []other:     Numbness/Tingling: none    Occupation/School:    Living Status/Prior Level of Function: Independent with ADLs and IADLs,     OBJECTIVE:      Flexibility L R Comment   Hamstring Mild restriction     Gastroc Mild restriction      ITB      Quad              ROM PROM AROM Overpressure Comment    L R L R L R    Flexion   ~95       Extension   ~-5                               Strength L R Comment   Quad Fair+ contraction     Hamstring      Gastroc      Hip  flexion      Hip abd                      Special Test Results/Comment   Meniscal Click    Crepitus    Flexion Test    Valgus Laxity    Varus Laxity    Lachmans    Drop Back    Homans neg           Girth L R   Mid Patella Slight effusion    Suprapatellar Slight effusion    5cm above     15cm above       Reflexes/Sensation:    [x]Dermatomes/Myotomes intact    []Reflexes equal and normal bilaterally   []Other:    Joint mobility:     []Normal    []Hypo   []Hyper    Palpation: general TTP due to recent surgery     Functional Mobility/Transfers: modifed I    Posture: WFL for a 50 yr old male     Bandages/Dressings/Incisions: CDI with glue    Gait: (include devices/WB status) WBAT B crutch    Orthopedic Special Tests:                        [x] Patient history, allergies, meds reviewed. Medical chart reviewed. See intake form. Review Of Systems (ROS):  [x]Performed Review of systems (Integumentary, CardioPulmonary, Neurological) by intake and observation. Intake form has been scanned into medical record. Patient has been instructed to contact their primary care physician regarding ROS issues if not already being addressed at this time.       Co-morbidities/Complexities (which will affect course of rehabilitation):   [x]None Arthritic conditions   []Rheumatoid arthritis (M05.9)  []Osteoarthritis (M19.91)   Cardiovascular conditions   []Hypertension (I10)  []Hyperlipidemia (E78.5)  []Angina pectoris (I20)  []Atherosclerosis (I70)   Musculoskeletal conditions   []Disc pathology   []Congenital spine pathologies   []Prior surgical intervention  []Osteoporosis (M81.8)  []Osteopenia (M85.8)   Endocrine conditions   []Hypothyroid (E03.9)  []Hyperthyroid Gastrointestinal conditions   []Constipation (E21.14)   Metabolic conditions   []Morbid obesity (E66.01)  []Diabetes type 1(E10.65) or 2 (E11.65)   []Neuropathy (G60.9)     Pulmonary conditions   []Asthma (J45)  []Coughing   []COPD (J44.9)   Psychological Disorders  []Anxiety (F41.9)  []Depression (F32.9)   []Other:   []Other:          Barriers to/and or personal factors that will affect rehab potential:              [x]Age  [x]Sex              [x]Motivation/Lack of Motivation                        []Co-Morbidities              []Cognitive Function, education/learning barriers              []Environmental, home barriers              []profession/work barriers  []past PT/medical experience  []other:  Justification:     Falls Risk Assessment (30 days):   [x] Falls Risk assessed and no intervention required.   [] Falls Risk assessed and Patient requires intervention due to being higher risk   TUG score (>12s at risk):     [] Falls education provided, including              ASSESSMENT:   Functional Impairments:     []Noted lumbar/proximal hip/LE hypomobility   [x]Decreased LE functional ROM   []Decreased core/proximal hip strength and neuromuscular control   [x]Decreased LE functional strength   []Reduced balance/proprioceptive control   []other:      Functional Activity Limitations (from functional questionnaire and intake)   [x]Reduced ability to tolerate prolonged functional positions   [x]Reduced ability or difficulty with changes of positions or transfers between positions   [x]Reduced ability to maintain good posture and demonstrate good body mechanics with sitting, bending, and lifting   [x]Reduced ability to sleep   [x] Reduced ability or tolerance with driving and/or computer work   [x]Reduced ability to perform lifting, carrying tasks   [x]Reduced ability to squat   [x]Reduced ability to forward bend   [x]Reduced ability to ambulate prolonged functional periods/distances/surfaces   [x]Reduced ability to ascend/descend stairs   [x]Reduced ability to run, hop or jump   []other:     Participation Restrictions   [x]Reduced participation in self care activities   [x]Reduced participation in home management activities   [x]Reduced participation in work activities   [x]Reduced participation in social activities. [x]Reduced participation in sport activities. Classification :    [x]Signs/symptoms consistent with post-surgical status including decreased ROM, strength and function.    []Signs/symptoms consistent with joint sprain/strain   []Signs/symptoms consistent with patella-femoral syndrome   []Signs/symptoms consistent with knee OA/hip OA   []Signs/symptoms consistent with internal derangement of knee/Hip   []Signs/symptoms consistent with functional hip weakness/NMR control      []Signs/symptoms consistent with tendinitis/tendinosis    []signs/symptoms consistent with pathology which may benefit from Dry needling      []other:      Prognosis/Rehab Potential:      []Excellent   [x]Good    []Fair   []Poor    Tolerance of evaluation/treatment:    []Excellent   [x]Good    []Fair   []Poor    Physical Therapy Evaluation Complexity Justification  [x] A history of present problem with:  [] no personal factors and/or comorbidities that impact the plan of care;  [x]1-2 personal factors and/or comorbidities that impact the plan of care  []3 personal factors and/or comorbidities that impact the plan of care  [x] An examination of body systems using standardized tests and measures addressing any of the daily - 7 x weekly - 3 sets - 10 reps  Sitting Heel Slide with Towel - 2-3 x daily - 7 x weekly - 10 reps - 10 hold    GOALS:   Patient stated goal: return to community activities     [] Progressing: [] Met: [] Not Met: [] Adjusted    Therapist goals for Patient:   Short Term Goals: To be achieved in: 2-4 weeks  1. Independent in HEP and progression per patient tolerance, in order to prevent re-injury. [] Progressing: [] Met: [] Not Met: [] Adjusted   2. Patient will have a decrease in pain to facilitate improvement in movement, function, and ADLs as indicated by Functional Deficits. [] Progressing: [] Met: [] Not Met: [] Adjusted    Long Term Goals: To be achieved in: 12 weeks  1. Foto68/100to assist with reaching prior level of function. [] Progressing: [] Met: [] Not Met: [] Adjusted  2. Patient will demonstrate increased AROM to =R to allow for proper joint functioning as indicated by patients Functional Deficits. [] Progressing: [] Met: [] Not Met: [] Adjusted  3. Patient will demonstrate an increase in Strength to good proximal hip strength and control  in LE to allow for proper functional mobility as indicated by patients Functional Deficits. [] Progressing: [] Met: [] Not Met: [] Adjusted  4. Patient will return to normal heel to toe gait in community  ascend descend 12 steps alternating  functional activities without increased symptoms or restriction. [] Progressing: [] Met: [] Not Met: [] Adjusted        Electronically signed by:  Latrell Greene PT    Note: If patient does not return for scheduled/ recommended follow up visits, this note will serve as a discharge from care along with most recent update on progress.

## 2022-09-16 NOTE — FLOWSHEET NOTE
The [address] [place], [place] [address], [address], [address] [place] [address]  Phone: [phone]   Fax:     [phone]    Physical Therapy Daily Treatment Note  Date:  2022    Patient Name:  Indira Ceja    :  1970  MRN: [id number]  Restrictions/Precautions:    Medical/Treatment Diagnosis Information:  Diagnosis: Primary osteoarthritis of left knee (M17.12)  Treatment Diagnosis: L knee LNKBA62.703  Insurance/Certification information:  PT Insurance Information: [facility]  Physician Information:   Dustin Mcghee  Has the plan of care been signed (Y/N):        []  Yes  [x]  No     Date of Patient follow up with Physician: per md      Is this a Progress Report:     []  Yes  [x]  No        If Yes:  Date Range for reporting period:  Beginning  Ending    Progress report will be due (10 Rx or 30 days whichever is less):        Recertification will be due (POC Duration  / 90 days whichever is less):          Visit # Insurance Allowable Auth Required   1 New insurance waiting on card []  Yes []  No        Functional Scale:  Foto56/100    Date assessed:         Latex Allergy:  [x]NO      []YES  Preferred Language for Healthcare:   [x]English       []other:    Pain level:  1-2/10     SUBJECTIVE:  ee eval    OBJECTIVE: See eval      RESTRICTIONS/PRECAUTIONS: acitivty moderation     Exercises/Interventions:   Exercise/Equipment Resistance/Repetitions Other comments   Stretching     Hamstring [id number]    Towel Pull [id number]    Inclined Calf     Hip Flexion     ITB     Groin     Quad                    SLR     Supine 3x10    Abduction 3x10    Adduction     Prone     SLR+          Isometrics     Quad sets [id number]         Patellar Glides     Medial     Superior     Inferior          ROM     Sheet Pulls [id number]    Hang Weights     Passive     Active     Weight Shift     Ankle Pumps hourly                   [facility]     Calf raises     Wall sits Step ups     1 leg stand     Squatting     CC TKE     Balance     bridges          PRE     Extension  RANGE:   Flexion  RANGE:        Quantum machines     Leg press      Leg extension     Leg curl          Manual interventions                     Therapeutic Exercise and NMR EXR  [x] (88200) Provided verbal/tactile cueing for activities related to strengthening, flexibility, endurance, ROM for improvements in LE, proximal hip, and core control with self care, mobility, lifting, ambulation.  [] (58517) Provided verbal/tactile cueing for activities related to improving balance, coordination, kinesthetic sense, posture, motor skill, proprioception  to assist with LE, proximal hip, and core control in self care, mobility, lifting, ambulation and eccentric single leg control.      NMR and Therapeutic Activities:    [] (01220 or 70330) Provided verbal/tactile cueing for activities related to improving balance, coordination, kinesthetic sense, posture, motor skill, proprioception and motor activation to allow for proper function of core, proximal hip and LE with self care and ADLs  [] (12526) Gait Re-education- Provided training and instruction to the patient for proper LE, core and proximal hip recruitment and positioning and eccentric body weight control with ambulation re-education including up and down stairs     Home Exercise Program:    [x] (46710) Reviewed/Progressed HEP activities related to strengthening, flexibility, endurance, ROM of core, proximal hip and LE for functional self-care, mobility, lifting and ambulation/stair navigation   [] (22283)Reviewed/Progressed HEP activities related to improving balance, coordination, kinesthetic sense, posture, motor skill, proprioception of core, proximal hip and LE for self care, mobility, lifting, and ambulation/stair navigation      Manual Treatments:  PROM / STM / Oscillations-Mobs:  G-I, II, III, IV (PA's, Inf., Post.)  [] (98966) Provided manual therapy to mobilize LE, proximal hip and/or LS spine soft tissue/joints for the purpose of modulating pain, promoting relaxation,  increasing ROM, reducing/eliminating soft tissue swelling/inflammation/restriction, improving soft tissue extensibility and allowing for proper ROM for normal function with self care, mobility, lifting and ambulation. Modalities:  vaso 15    Charges:  Timed Code Treatment Minutes: 25   Total Treatment Minutes: 60       [x] EVAL (LOW) 88389 (typically 20 minutes face-to-face)  [] EVAL (MOD) 92774 (typically 30 minutes face-to-face)  [] EVAL (HIGH) 42456 (typically 45 minutes face-to-face)  [] RE-EVAL   [x] SB(30363) x  1   [] IONTO  [] NMR (78184) x     [x] VASO  [] Manual (72005) x      [] Other:  [] TA x      [] Mech Traction (08571)  [] ES(attended) (33574)      [] ES (un) (49439):     GOALS:   Patient stated goal: return to community activities     [] Progressing: [] Met: [] Not Met: [] Adjusted     Therapist goals for Patient:   Short Term Goals: To be achieved in: 2-4 weeks  1. Independent in HEP and progression per patient tolerance, in order to prevent re-injury. [] Progressing: [] Met: [] Not Met: [] Adjusted   2. Patient will have a decrease in pain to facilitate improvement in movement, function, and ADLs as indicated by Functional Deficits. [] Progressing: [] Met: [] Not Met: [] Adjusted     Long Term Goals: To be achieved in: 12 weeks  1. Foto68/100to assist with reaching prior level of function. [] Progressing: [] Met: [] Not Met: [] Adjusted  2. Patient will demonstrate increased AROM to =R to allow for proper joint functioning as indicated by patients Functional Deficits. [] Progressing: [] Met: [] Not Met: [] Adjusted  3. Patient will demonstrate an increase in Strength to good proximal hip strength and control  in LE to allow for proper functional mobility as indicated by patients Functional Deficits. [] Progressing: [] Met: [] Not Met: [] Adjusted  4.  Patient will return to normal heel to toe gait in community  ascend descend 12 steps alternating  functional activities without increased symptoms or restriction. [] Progressing: [] Met: [] Not Met: [] Adjusted  Overall Progression Towards Functional goals/ Treatment Progress Update:  [] Patient is progressing as expected towards functional goals listed. [] Progression is slowed due to complexities/Impairments listed. [] Progression has been slowed due to co-morbidities. [x] Plan just implemented, too soon to assess goals progression <30days   [] Goals require adjustment due to lack of progress  [] Patient is not progressing as expected and requires additional follow up with physician  [] Other    Prognosis for POC: [x] Good [] Fair  [] Poor      Patient requires continued skilled intervention: [x] Yes  [] No    Treatment/Activity Tolerance:  [x] Patient able to complete treatment  [] Patient limited by fatigue  [] Patient limited by pain     [] Patient limited by other medical complications  [] Other:     Patient Education:  Reviewed diagnosis, POC, HEP and its importance. Access Code: [email or web address]  URL: [email or web address]  Date: 09/16/2022  Prepared by: Leslie Yousif     Exercises  Long Sitting Ankle Pumps - 8 x daily - 7 x weekly - 3 sets - 10 reps - 1 hold  Long Sitting Calf Stretch with Strap - 2-3 x daily - 7 x weekly - 5 reps - 30 hold  Seated Table Hamstring Stretch - 2-3 x daily - 7 x weekly - 5 reps - 30 hold  Long Sitting Quad Set - 2-3 x daily - 7 x weekly - 10 reps - 10 hold  Active Straight Leg Raise with Quad Set - 2-3 x daily - 7 x weekly - 3 sets - 10 reps  Sidelying Hip Abduction - 2-3 x daily - 7 x weekly - 3 sets - 10 reps  Sitting Heel Slide with Towel - 2-3 x daily - 7 x weekly - 10 reps - 10 hold     PLAN: See eval  [] Continue per plan of care [] Alter current plan (see comments above)  [x] Plan of care initiated [] Hold pending MD visit [] Discharge      Electronically signed by: Pk Jeff, PT    Note: If patient does not return for scheduled/ recommended follow up visits, this note will serve as a discharge from care along with most recent update on progress.

## 2022-09-16 NOTE — PROGRESS NOTES
Chief Complaint  Post-Op Check (Left knee. S/p 1 day scope )      History of Present Illness:  Divine Bach is a pleasant 50 y.o. male presents for postoperative day 1 follow-up from left knee arthroscopy with chondroplasty of patella and trochlear groove, synovectomy. Patient is doing well with pain controlled. States he can now bend his knee without catching, locking, or instability. Denies numbness, tingling, decreased sensation or diminished motor function in the extremity      Medical History:  Patient's medications, allergies, past medical, surgical, social and family histories were reviewed and updated as appropriate. Pertinent items are noted in HPI  Review of systems reviewed from Patient History Form dated on September 16, 2022 and available in the patient's chart under the Media tab. Vital Signs: There were no vitals filed for this visit. Neuro: Alert & oriented x 3,  normal,  no focal deficits noted. Normal affect. Eyes: sclera clear  Ears: Normal external ear  Mouth:  No perioral lesions  Pulm: Respirations unlabored and regular  Pulse: Regular rate and rhythm   Skin: Warm, well perfused      Constitutional: In no apparent distress. Normal affect. Alert and oriented X3 and is cooperative. Left knee exam    Gait: Antalgic with use of crutches    Alignment: normal alignment. Inspection/skin: Skin is intact without erythema or ecchymosis. No gross deformity. Skin incisions approximated without draining    Palpation: no crepitus. no joint line tenderness present. Range of Motion: There is full range of motion. Strength: Normal quadriceps development. Effusion: No effusion or swelling present. Ligamentous stability: No cruciate or collateral ligament instability. Neurologic and vascular: Skin is warm and well-perfused. Sensation is intact to light-touch.          Radiology:     No new images obtained         Assessment : 82-year-old male postop left knee arthroscopy with chondroplasty of patella and trochlear groove, synovectomy    Impression:  Encounter Diagnosis   Name Primary? Osteoarthritis of left patellofemoral joint Yes       Office Procedures:  No orders of the defined types were placed in this encounter. Plan: We discussed the patient's diagnosis of chondromalacia patellofemoral joint at length. He will follow standard postoperative protocol for knee arthroscopy. He will begin his physical therapy and see us back in 4 weeks. Ha Alva is in agreement with this plan. All questions were answered to patient's satisfaction and was encouraged to call with any further questions. Mark Gallo D.O. Orthopedic Surgeon, [facility] Fellow    Total time spent for evaluation, education, and development of treatment plan: 30 minutes        This dictation was performed with a verbal recognition program (DRAGON) and it was checked for errors. It is possible that there are still dictated errors within this office note. If so, please bring any errors to my attention for an addendum. All efforts were made to ensure that this office note is accurate. I personally reviewed the patient's pain scale, review of systems, family history, social history, past medical history, allergies and medications. Review of systems was collected today, reviewed and is included in the medical record. It is available under the media tab. Lenka Sargent MD  Sports Medicine, Knee and Shoulder Surgery    This dictation was performed with a verbal recognition program [facility]) and it was checked for errors. It is possible that there are still dictated errors within this office note. If so, please bring any errors to my attention for an addendum. All efforts were made to ensure that this office note is accurate.

## 2022-09-19 ENCOUNTER — HOSPITAL ENCOUNTER (OUTPATIENT)
Dept: PHYSICAL THERAPY | Age: 52
Setting detail: THERAPIES SERIES
Discharge: HOME | End: 2022-09-19
Payer: COMMERCIAL

## 2022-09-19 PROCEDURE — 97110 THERAPEUTIC EXERCISES: CPT

## 2022-09-19 PROCEDURE — 97016 VASOPNEUMATIC DEVICE THERAPY: CPT

## 2022-09-19 NOTE — FLOWSHEET NOTE
[facility] 11, [address] [employer] [address] [place] [address], [address] [place] [address]  Phone: [phone]   Fax:     [phone]    Physical Therapy Daily Treatment Note  Date:  2022    Patient Name:  Teresa Sutton    :  1970  MRN: [id number]  Restrictions/Precautions:    Medical/Treatment Diagnosis Information:  Diagnosis: Primary osteoarthritis of left knee (M17.12)  Treatment Diagnosis: L knee MYSYK55.059  Insurance/Certification information:  PT Insurance Information: [facility]  Physician Information:   [facility]  Has the plan of care been signed (Y/N):        []  Yes  [x]  No     Date of Patient follow up with Physician: per md      Is this a Progress Report:     []  Yes  [x]  No        If Yes:  Date Range for reporting period:  Beginning  Ending    Progress report will be due (10 Rx or 30 days whichever is less): 86/10       Recertification will be due (POC Duration  / 90 days whichever is less):          Visit # Insurance Allowable Auth Required   2 New insurance waiting on card []  Yes []  No        Functional Scale: FOTO: 56/100    Date assessed:         Latex Allergy:  [x]NO      []YES  Preferred Language for Healthcare:   [x]English       []other:    Pain level:  3/10     SUBJECTIVE:  Pt reports his knee feels a little more uncomfortable today, he thinks it's because he went a long stretch  last night with no pain medication. Pt notes drowsiness as a side effect from pain medication. Pt slept his longest time last night as well and he continues to feel better than prior to surgery with ? catching and instability. Pt using x2 crutches primarily, alternating between putting full weight and very minimal weight on knee depending on how it feels.      OBJECTIVE:    ROM knee flex 109 ° , knee ext 0 ° with over pressure  Observation: portal sites healing well; pt amb with bilat crutches    RESTRICTIONS/PRECAUTIONS: acitivty moderation Exercises/Interventions:   Exercise/Equipment Resistance/Repetitions Other comments   Stretching     Hamstring [id number]    Towel Pull [id number]    Inclined Calf     Hip Flexion     ITB     Groin     Quad                    SLR     Supine 3x10    Abduction 3x10    Adduction     Prone     SLR+     clamshell 3x10  Added 9/19   Isometrics     Quad sets [id number]         Patellar Glides     Medial     Superior     Inferior          ROM     Sheet Pulls [id number]    Hang Weights     Passive     Active     Weight Shift     Ankle Pumps hourly                   CKC     Calf raises 3x10 Added 9/19   Wall sits     Step ups     1 leg stand     Squatting     CC TKE     Balance     bridges          PRE     Extension  RANGE:   Flexion  RANGE:        Quantum machines     Leg press      Leg extension     Leg curl          Manual interventions                     Therapeutic Exercise and NMR EXR  [x] (80586) Provided verbal/tactile cueing for activities related to strengthening, flexibility, endurance, ROM for improvements in LE, proximal hip, and core control with self care, mobility, lifting, ambulation.  [] (57097) Provided verbal/tactile cueing for activities related to improving balance, coordination, kinesthetic sense, posture, motor skill, proprioception  to assist with LE, proximal hip, and core control in self care, mobility, lifting, ambulation and eccentric single leg control.      NMR and Therapeutic Activities:    [] (60190 or 22254) Provided verbal/tactile cueing for activities related to improving balance, coordination, kinesthetic sense, posture, motor skill, proprioception and motor activation to allow for proper function of core, proximal hip and LE with self care and ADLs  [] (19137) Gait Re-education- Provided training and instruction to the patient for proper LE, core and proximal hip recruitment and positioning and eccentric body weight control with ambulation re-education including up and down stairs     Home Exercise Program:    [x] (61035) Reviewed/Progressed HEP activities related to strengthening, flexibility, endurance, ROM of core, proximal hip and LE for functional self-care, mobility, lifting and ambulation/stair navigation   [] (18567)Reviewed/Progressed HEP activities related to improving balance, coordination, kinesthetic sense, posture, motor skill, proprioception of core, proximal hip and LE for self care, mobility, lifting, and ambulation/stair navigation      Manual Treatments:  PROM / STM / Oscillations-Mobs:  G-I, II, III, IV (PA's, Inf., Post.)  [x] (20243) Provided manual therapy to mobilize LE, proximal hip and/or LS spine soft tissue/joints for the purpose of modulating pain, promoting relaxation,  increasing ROM, reducing/eliminating soft tissue swelling/inflammation/restriction, improving soft tissue extensibility and allowing for proper ROM for normal function with self care, mobility, lifting and ambulation. Modalities:  vaso 15    Charges:  Timed Code Treatment Minutes: 40   Total Treatment Minutes: 55       [] EVAL (LOW) 72227 (typically 20 minutes face-to-face)  [] EVAL (MOD) 91101 (typically 30 minutes face-to-face)  [] EVAL (HIGH) 15288 (typically 45 minutes face-to-face)  [] RE-EVAL   [x] AC(16458) x  3   [] IONTO  [] NMR (19803) x     [x] VASO  [] Manual (97104) x      [] Other:  [] TA x      [] Mech Traction (68240)  [] ES(attended) (41934)      [] ES (un) (23501):     GOALS:   Patient stated goal: return to community activities     [] Progressing: [] Met: [] Not Met: [] Adjusted     Therapist goals for Patient:   Short Term Goals: To be achieved in: 2-4 weeks  1. Independent in HEP and progression per patient tolerance, in order to prevent re-injury. [] Progressing: [] Met: [] Not Met: [] Adjusted   2. Patient will have a decrease in pain to facilitate improvement in movement, function, and ADLs as indicated by Functional Deficits.     [] Progressing: [] Met: [] Not Met: [] Adjusted     Long Term Goals: To be achieved in: 12 weeks  1. Foto68/100to assist with reaching prior level of function. [] Progressing: [] Met: [] Not Met: [] Adjusted  2. Patient will demonstrate increased AROM to =R to allow for proper joint functioning as indicated by patients Functional Deficits. [] Progressing: [] Met: [] Not Met: [] Adjusted  3. Patient will demonstrate an increase in Strength to good proximal hip strength and control  in LE to allow for proper functional mobility as indicated by patients Functional Deficits. [] Progressing: [] Met: [] Not Met: [] Adjusted  4. Patient will return to normal heel to toe gait in community  ascend descend 12 steps alternating  functional activities without increased symptoms or restriction. [] Progressing: [] Met: [] Not Met: [] Adjusted  Overall Progression Towards Functional goals/ Treatment Progress Update:  [] Patient is progressing as expected towards functional goals listed. [] Progression is slowed due to complexities/Impairments listed. [] Progression has been slowed due to co-morbidities. [x] Plan just implemented, too soon to assess goals progression <30days   [] Goals require adjustment due to lack of progress  [] Patient is not progressing as expected and requires additional follow up with physician  [] Other    Prognosis for POC: [x] Good [] Fair  [] Poor      Patient requires continued skilled intervention: [x] Yes  [] No    Treatment/Activity Tolerance:  [x] Patient able to complete treatment  [] Patient limited by fatigue  [] Patient limited by pain     [] Patient limited by other medical complications  [] Other:     Patient Education:  Reviewed diagnosis, POC, HEP and its importance. Access Code: [email or web address]  URL: [email or web address]  Date: 09/16/2022  Prepared by: Leona Gregory     Exercises  Long Sitting Ankle Pumps - 8 x daily - 7 x weekly - 3 sets - 10 reps - 1 hold  Long Sitting Calf Stretch with Strap - 2-3 x daily - 7 x weekly - 5 reps - 30 hold  Seated Table Hamstring Stretch - 2-3 x daily - 7 x weekly - 5 reps - 30 hold  Long Sitting Quad Set - 2-3 x daily - 7 x weekly - 10 reps - 10 hold  Active Straight Leg Raise with Quad Set - 2-3 x daily - 7 x weekly - 3 sets - 10 reps  Sidelying Hip Abduction - 2-3 x daily - 7 x weekly - 3 sets - 10 reps  Sitting Heel Slide with Towel - 2-3 x daily - 7 x weekly - 10 reps - 10 hold     PLAN: See eval  [x] Continue per plan of care [] Alter current plan (see comments above)  [] Plan of care initiated [] Hold pending MD visit [] Discharge      Electronically signed by:  Jeremias Carlson PT    Note: If patient does not return for scheduled/ recommended follow up visits, this note will serve as a discharge from care along with most recent update on progress.

## 2022-09-21 ENCOUNTER — HOSPITAL ENCOUNTER (OUTPATIENT)
Dept: PHYSICAL THERAPY | Age: 52
Setting detail: THERAPIES SERIES
Discharge: HOME | End: 2022-09-21
Payer: COMMERCIAL

## 2022-09-21 PROCEDURE — 97016 VASOPNEUMATIC DEVICE THERAPY: CPT

## 2022-09-21 PROCEDURE — 97110 THERAPEUTIC EXERCISES: CPT

## 2022-09-21 NOTE — FLOWSHEET NOTE
[facility] 56, [address] [employer] [address] [place] [address], [address] [place] [address]  Phone: [phone]   Fax:     [phone]    Physical Therapy Daily Treatment Note  Date:  2022    Patient Name:  Kenny Del Rosario    :  1970  MRN: [id number]  Restrictions/Precautions:    Medical/Treatment Diagnosis Information:  Diagnosis: Primary osteoarthritis of left knee (M17.12)  Treatment Diagnosis: L knee pain M86.298  Insurance/Certification information:  PT Insurance Information: [facility]  Physician Information:   Val Espinal  Has the plan of care been signed (Y/N):        []  Yes  [x]  No     Date of Patient follow up with Physician: per md, mid Oct (not scheduled yet)      Is this a Progress Report:     []  Yes  [x]  No        If Yes:  Date Range for reporting period:  Beginning  Ending    Progress report will be due (10 Rx or 30 days whichever is less):        Recertification will be due (POC Duration  / 90 days whichever is less):          Visit # Insurance Allowable Auth Required   3 New insurance waiting on card []  Yes []  No        Functional Scale: FOTO: 56/100    Date assessed:         Latex Allergy:  [x]NO      []YES  Preferred Language for Healthcare:   [x]English       []other:    Pain level:  3/10     SUBJECTIVE:  Pt reports he feels better. He has dropped to one crutch and has only had to take pain medication in morning and evening. Pt was able to work 6 hours or so at desk with leg in dependent position.      OBJECTIVE:    ROM knee flex 117 ° , knee ext 0 ° very minimal over pressure, after stretching and quad sets  Observation: portal sites healing well; pt amb with bilat crutches    RESTRICTIONS/PRECAUTIONS: acitivty moderation     Exercises/Interventions:   Exercise/Equipment Resistance/Repetitions Other comments   Stretching     Hamstring [id number]    Towel Pull gastroc [id number]    Inclined Calf     Hip Flexion     ITB functional self-care, mobility, lifting and ambulation/stair navigation   [] (78436)Reviewed/Progressed HEP activities related to improving balance, coordination, kinesthetic sense, posture, motor skill, proprioception of core, proximal hip and LE for self care, mobility, lifting, and ambulation/stair navigation      Manual Treatments:  PROM / STM / Oscillations-Mobs:  G-I, II, III, IV (PA's, Inf., Post.)  [x] (68127) Provided manual therapy to mobilize LE, proximal hip and/or LS spine soft tissue/joints for the purpose of modulating pain, promoting relaxation,  increasing ROM, reducing/eliminating soft tissue swelling/inflammation/restriction, improving soft tissue extensibility and allowing for proper ROM for normal function with self care, mobility, lifting and ambulation. Modalities:  vaso 15    Charges:  Timed Code Treatment Minutes: 40   Total Treatment Minutes: 55       [] EVAL (LOW) 52814 (typically 20 minutes face-to-face)  [] EVAL (MOD) 11596 (typically 30 minutes face-to-face)  [] EVAL (HIGH) 47861 (typically 45 minutes face-to-face)  [] RE-EVAL   [x] KG(17843) x  3   [] IONTO  [] NMR (37672) x     [x] VASO  [] Manual (04340) x      [] Other:  [] TA x      [] Mech Traction (40430)  [] ES(attended) (51791)      [] ES (un) (77161):     GOALS:   Patient stated goal: return to community activities     [] Progressing: [] Met: [] Not Met: [] Adjusted     Therapist goals for Patient:   Short Term Goals: To be achieved in: 2-4 weeks  1. Independent in HEP and progression per patient tolerance, in order to prevent re-injury. [] Progressing: [] Met: [] Not Met: [] Adjusted   2. Patient will have a decrease in pain to facilitate improvement in movement, function, and ADLs as indicated by Functional Deficits. [] Progressing: [] Met: [] Not Met: [] Adjusted     Long Term Goals: To be achieved in: 12 weeks  1. Foto68/100to assist with reaching prior level of function.    [] Progressing: [] Met: [] Not Met: [] Adjusted  2. Patient will demonstrate increased AROM to =R to allow for proper joint functioning as indicated by patients Functional Deficits. [] Progressing: [] Met: [] Not Met: [] Adjusted  3. Patient will demonstrate an increase in Strength to good proximal hip strength and control  in LE to allow for proper functional mobility as indicated by patients Functional Deficits. [] Progressing: [] Met: [] Not Met: [] Adjusted  4. Patient will return to normal heel to toe gait in community  ascend descend 12 steps alternating  functional activities without increased symptoms or restriction. [] Progressing: [] Met: [] Not Met: [] Adjusted  Overall Progression Towards Functional goals/ Treatment Progress Update:  [] Patient is progressing as expected towards functional goals listed. [] Progression is slowed due to complexities/Impairments listed. [] Progression has been slowed due to co-morbidities. [x] Plan just implemented, too soon to assess goals progression <30days   [] Goals require adjustment due to lack of progress  [] Patient is not progressing as expected and requires additional follow up with physician  [] Other    Prognosis for POC: [x] Good [] Fair  [] Poor      Patient requires continued skilled intervention: [x] Yes  [] No    Treatment/Activity Tolerance:  [x] Patient able to complete treatment  [] Patient limited by fatigue  [] Patient limited by pain     [] Patient limited by other medical complications  [x] Other: Pt exhibits improved tolerance to gait with one crutch and is exhibiting improving ROM. Pt will benefit from ? strength and stability as tolerated. Patient Education:  Reviewed diagnosis, POC, HEP and its importance. Access Code: [email or web address]  URL: [email or web address]  Date: 09/16/2022  Prepared by: Laura Herrera     Exercises  Long Sitting Ankle Pumps - 8 x daily - 7 x weekly - 3 sets - 10 reps - 1 hold  Long Sitting Calf Stretch with Strap - 2-3 x daily - 7 x weekly - 5 reps - 30 hold  Seated Table Hamstring Stretch - 2-3 x daily - 7 x weekly - 5 reps - 30 hold  Long Sitting Quad Set - 2-3 x daily - 7 x weekly - 10 reps - 10 hold  Active Straight Leg Raise with Quad Set - 2-3 x daily - 7 x weekly - 3 sets - 10 reps  Sidelying Hip Abduction - 2-3 x daily - 7 x weekly - 3 sets - 10 reps  Sitting Heel Slide with Towel - 2-3 x daily - 7 x weekly - 10 reps - 10 hold     PLAN: See eval  [x] Continue per plan of care [] Alter current plan (see comments above)  [] Plan of care initiated [] Hold pending MD visit [] Discharge      Electronically signed by:  Veda Aguilar PT    Note: If patient does not return for scheduled/ recommended follow up visits, this note will serve as a discharge from care along with most recent update on progress.

## 2022-09-26 ENCOUNTER — HOSPITAL ENCOUNTER (OUTPATIENT)
Dept: PHYSICAL THERAPY | Age: 52
Setting detail: THERAPIES SERIES
Discharge: HOME | End: 2022-09-26
Payer: COMMERCIAL

## 2022-09-26 PROCEDURE — 97110 THERAPEUTIC EXERCISES: CPT

## 2022-09-26 PROCEDURE — 97016 VASOPNEUMATIC DEVICE THERAPY: CPT

## 2022-09-26 NOTE — FLOWSHEET NOTE
[address],  [address]  Phone: [phone]   Fax:     [phone]    Physical Therapy Daily Treatment Note  Date:  2022    Patient Name:  Milvia Abreu    :  1970  MRN: [id number]  Restrictions/Precautions:    Medical/Treatment Diagnosis Information:  Diagnosis: Primary osteoarthritis of left knee (M17.12)  Treatment Diagnosis: L knee pain X27.068  Insurance/Certification information:  PT Insurance Information: [facility]  Physician Information:   Christina Farah  Has the plan of care been signed (Y/N):        []  Yes  [x]  No     Date of Patient follow up with Physician: per md, mid Oct (not scheduled yet)      Is this a Progress Report:     []  Yes  [x]  No        If Yes:  Date Range for reporting period:  Beginning  Ending    Progress report will be due (10 Rx or 30 days whichever is less):        Recertification will be due (POC Duration  / 90 days whichever is less):          Visit # Insurance Allowable Auth Required   4 New insurance waiting on card []  Yes []  No        Functional Scale: FOTO: 56/100    Date assessed:         Latex Allergy:  [x]NO      []YES  Preferred Language for Healthcare:   [x]English       []other:    Pain level:  3/10     SUBJECTIVE:  Pt reports he feels mostly achy pain, most pain is 3-4/10. Pt is only taking pain medication at night. Pt will occasionally take advil. Pt reports he is able to tolerate sitting for work but does feel like he reaches a point when he just can't sit anymore.  Pt will experience sharp pains at times when he lifts too much    OBJECTIVE:    ROM knee flex 120 ° , knee ext 0 ° very minimal over pressure, after stretching and quad sets  Observation: portal sites healing well; pt amb with bilat crutches    RESTRICTIONS/PRECAUTIONS: acitivty moderation     Exercises/Interventions:   Exercise/Equipment Resistance/Repetitions Exercise Program:    [x] (52634) Reviewed/Progressed HEP activities related to strengthening, flexibility, endurance, ROM of core, proximal hip and LE for functional self-care, mobility, lifting and ambulation/stair navigation   [] (18018)Reviewed/Progressed HEP activities related to improving balance, coordination, kinesthetic sense, posture, motor skill, proprioception of core, proximal hip and LE for self care, mobility, lifting, and ambulation/stair navigation      Manual Treatments:  PROM / STM / Oscillations-Mobs:  G-I, II, III, IV (PA's, Inf., Post.)  [x] (17408) Provided manual therapy to mobilize LE, proximal hip and/or LS spine soft tissue/joints for the purpose of modulating pain, promoting relaxation,  increasing ROM, reducing/eliminating soft tissue swelling/inflammation/restriction, improving soft tissue extensibility and allowing for proper ROM for normal function with self care, mobility, lifting and ambulation. Modalities:  vaso 15    Charges:  Timed Code Treatment Minutes: 40   Total Treatment Minutes: 55       [] EVAL (LOW) 59158 (typically 20 minutes face-to-face)  [] EVAL (MOD) 77520 (typically 30 minutes face-to-face)  [] EVAL (HIGH) 74300 (typically 45 minutes face-to-face)  [] RE-EVAL   [x] DC(85655) x  3   [] IONTO  [] NMR (50643) x     [x] VASO  [] Manual (77141) x      [] Other:  [] TA x      [] Mech Traction (39651)  [] ES(attended) (22038)      [] ES (un) (94052):     GOALS:   Patient stated goal: return to community activities     [] Progressing: [] Met: [] Not Met: [] Adjusted     Therapist goals for Patient:   Short Term Goals: To be achieved in: 2-4 weeks  1. Independent in HEP and progression per patient tolerance, in order to prevent re-injury. [] Progressing: [] Met: [] Not Met: [] Adjusted   2. Patient will have a decrease in pain to facilitate improvement in movement, function, and ADLs as indicated by Functional Deficits.     [] Progressing: [] Met: [] Not Met: [] Adjusted     Long Term Goals: To be achieved in: 12 weeks  1. Foto68/100to assist with reaching prior level of function. [] Progressing: [] Met: [] Not Met: [] Adjusted  2. Patient will demonstrate increased AROM to =R to allow for proper joint functioning as indicated by patients Functional Deficits. [] Progressing: [] Met: [] Not Met: [] Adjusted  3. Patient will demonstrate an increase in Strength to good proximal hip strength and control  in LE to allow for proper functional mobility as indicated by patients Functional Deficits. [] Progressing: [] Met: [] Not Met: [] Adjusted  4. Patient will return to normal heel to toe gait in community  ascend descend 12 steps alternating  functional activities without increased symptoms or restriction. [] Progressing: [] Met: [] Not Met: [] Adjusted  Overall Progression Towards Functional goals/ Treatment Progress Update:  [] Patient is progressing as expected towards functional goals listed. [] Progression is slowed due to complexities/Impairments listed. [] Progression has been slowed due to co-morbidities. [x] Plan just implemented, too soon to assess goals progression <30days   [] Goals require adjustment due to lack of progress  [] Patient is not progressing as expected and requires additional follow up with physician  [] Other    Prognosis for POC: [x] Good [] Fair  [] Poor      Patient requires continued skilled intervention: [x] Yes  [] No    Treatment/Activity Tolerance:  [x] Patient able to complete treatment  [] Patient limited by fatigue  [] Patient limited by pain     [] Patient limited by other medical complications  [x] Other: Pt exhibits improved tolerance to gait with one crutch and is exhibiting improving ROM. Pt will benefit from ? strength and stability as tolerated. Patient Education:  Reviewed diagnosis, POC, HEP and its importance. Access Code: [email or web address]  URL: [email or web address]  Date: 09/16/2022  Prepared by: Bigg Mcgrath     Exercises  Long Sitting Ankle Pumps - 8 x daily - 7 x weekly - 3 sets - 10 reps - 1 hold  Long Sitting Calf Stretch with Strap - 2-3 x daily - 7 x weekly - 5 reps - 30 hold  Seated Table Hamstring Stretch - 2-3 x daily - 7 x weekly - 5 reps - 30 hold  Long Sitting Quad Set - 2-3 x daily - 7 x weekly - 10 reps - 10 hold  Active Straight Leg Raise with Quad Set - 2-3 x daily - 7 x weekly - 3 sets - 10 reps  Sidelying Hip Abduction - 2-3 x daily - 7 x weekly - 3 sets - 10 reps  Sitting Heel Slide with Towel - 2-3 x daily - 7 x weekly - 10 reps - 10 hold     PLAN: See eval  [x] Continue per plan of care [] Alter current plan (see comments above)  [] Plan of care initiated [] Hold pending MD visit [] Discharge      Electronically signed by:  Jewels Ventura PT    Note: If patient does not return for scheduled/ recommended follow up visits, this note will serve as a discharge from care along with most recent update on progress.

## 2022-09-28 ENCOUNTER — HOSPITAL ENCOUNTER (OUTPATIENT)
Dept: PHYSICAL THERAPY | Age: 52
Setting detail: THERAPIES SERIES
Discharge: HOME | End: 2022-09-28
Payer: COMMERCIAL

## 2022-09-28 PROCEDURE — 97016 VASOPNEUMATIC DEVICE THERAPY: CPT

## 2022-09-28 PROCEDURE — 97110 THERAPEUTIC EXERCISES: CPT

## 2022-09-28 NOTE — FLOWSHEET NOTE
[address],  [address]  Phone: [phone]   Fax:     [phone]    Physical Therapy Daily Treatment Note  Date:  2022    Patient Name:  Shantel Guerrier    :  1970  MRN: [id number]  Restrictions/Precautions:    Medical/Treatment Diagnosis Information:  Diagnosis: Primary osteoarthritis of left knee (M17.12)  Treatment Diagnosis: L knee pain G28.189  Insurance/Certification information:  PT Insurance Information: [employer]  Physician Information:   Ashley Tracy  Has the plan of care been signed (Y/N):        []  Yes  [x]  No     Date of Patient follow up with Physician: per md, mid Oct (not scheduled yet)      Is this a Progress Report:     []  Yes  [x]  No        If Yes:  Date Range for reporting period:  Beginning  Ending    Progress report will be due (10 Rx or 30 days whichever is less):        Recertification will be due (POC Duration  / 90 days whichever is less):          Visit # Insurance Allowable Auth Required   4 New insurance waiting on card []  Yes []  No        Functional Scale: FOTO: 56/100    Date assessed:         Latex Allergy:  [x]NO      []YES  Preferred Language for Healthcare:   [x]English       []other:    Pain level:  2/10     SUBJECTIVE:  Pt reports his knee continues to feel better. He feels aching in knee, especially with full extension. Overall he's feeling a lot better, has not taken pain medication since Monday night.     OBJECTIVE:    ROM knee flex 120 ° , knee ext 0 ° very minimal over pressure, after stretching and quad sets  Observation: portal sites healing well; pt amb with bilat crutches    RESTRICTIONS/PRECAUTIONS: acitivty moderation     Exercises/Interventions:   Exercise/Equipment Resistance/Repetitions Other comments   Stretching     Hamstring [id number]    Towel Pull gastroc [id number]    Inclined Calf     Hip Flexion     ITB     Groin Quad                    SLR     Supine 3x10 1# ^ 9/26   Abduction 3x10 1# ^9/26   Adduction     Prone     SLR+     clamshell 3x10  Added 9/19   Isometrics     Quad sets [id number]         Patellar Glides     Medial     Superior     Inferior          ROM     Sheet Pulls [id number]    Hang Weights     Passive     Active     Weight Shift     Ankle Pumps hourly                   CKC     Calf raises 3x10 Added 9/19   Wall sits     Step ups     1 leg stand     Squatting     CC TKE 5\"x15 Added 9/21, blackTB   Balance: lateral, fwd, bwd 1x10 Added 9/26   bridges 3x10 Added 9/28        PRE     Extension  RANGE:   Flexion  RANGE:        Quantum machines     Leg press      Leg extension     Leg curl          Manual interventions                     Therapeutic Exercise and NMR EXR  [x] (21177) Provided verbal/tactile cueing for activities related to strengthening, flexibility, endurance, ROM for improvements in LE, proximal hip, and core control with self care, mobility, lifting, ambulation.  [] (65664) Provided verbal/tactile cueing for activities related to improving balance, coordination, kinesthetic sense, posture, motor skill, proprioception  to assist with LE, proximal hip, and core control in self care, mobility, lifting, ambulation and eccentric single leg control.      NMR and Therapeutic Activities:    [] (47065 or 90728) Provided verbal/tactile cueing for activities related to improving balance, coordination, kinesthetic sense, posture, motor skill, proprioception and motor activation to allow for proper function of core, proximal hip and LE with self care and ADLs  [] (49447) Gait Re-education- Provided training and instruction to the patient for proper LE, core and proximal hip recruitment and positioning and eccentric body weight control with ambulation re-education including up and down stairs     Home Exercise Program:    [x] (04990) Reviewed/Progressed HEP activities related to strengthening, flexibility, endurance, ROM of core, proximal hip and LE for functional self-care, mobility, lifting and ambulation/stair navigation   [] (06223)Reviewed/Progressed HEP activities related to improving balance, coordination, kinesthetic sense, posture, motor skill, proprioception of core, proximal hip and LE for self care, mobility, lifting, and ambulation/stair navigation      Manual Treatments:  PROM / STM / Oscillations-Mobs:  G-I, II, III, IV (PA's, Inf., Post.)  [x] (26179) Provided manual therapy to mobilize LE, proximal hip and/or LS spine soft tissue/joints for the purpose of modulating pain, promoting relaxation,  increasing ROM, reducing/eliminating soft tissue swelling/inflammation/restriction, improving soft tissue extensibility and allowing for proper ROM for normal function with self care, mobility, lifting and ambulation. Modalities:  vaso 15    Charges:  Timed Code Treatment Minutes: 40   Total Treatment Minutes: 55       [] EVAL (LOW) 88735 (typically 20 minutes face-to-face)  [] EVAL (MOD) 67371 (typically 30 minutes face-to-face)  [] EVAL (HIGH) 16920 (typically 45 minutes face-to-face)  [] RE-EVAL   [x] XH(46665) x  3   [] IONTO  [] NMR (10640) x     [x] VASO  [] Manual (90161) x      [] Other:  [] TA x      [] Mech Traction (37760)  [] ES(attended) (45924)      [] ES (un) (72329):     GOALS:   Patient stated goal: return to community activities     [] Progressing: [] Met: [] Not Met: [] Adjusted     Therapist goals for Patient:   Short Term Goals: To be achieved in: 2-4 weeks  1. Independent in HEP and progression per patient tolerance, in order to prevent re-injury. [] Progressing: [] Met: [] Not Met: [] Adjusted   2. Patient will have a decrease in pain to facilitate improvement in movement, function, and ADLs as indicated by Functional Deficits. [] Progressing: [] Met: [] Not Met: [] Adjusted     Long Term Goals: To be achieved in: 12 weeks  1. Foto68/100to assist with reaching prior level of function. [] Progressing: [] Met: [] Not Met: [] Adjusted  2. Patient will demonstrate increased AROM to =R to allow for proper joint functioning as indicated by patients Functional Deficits. [] Progressing: [] Met: [] Not Met: [] Adjusted  3. Patient will demonstrate an increase in Strength to good proximal hip strength and control  in LE to allow for proper functional mobility as indicated by patients Functional Deficits. [] Progressing: [] Met: [] Not Met: [] Adjusted  4. Patient will return to normal heel to toe gait in community  ascend descend 12 steps alternating  functional activities without increased symptoms or restriction. [] Progressing: [] Met: [] Not Met: [] Adjusted  Overall Progression Towards Functional goals/ Treatment Progress Update:  [] Patient is progressing as expected towards functional goals listed. [] Progression is slowed due to complexities/Impairments listed. [] Progression has been slowed due to co-morbidities. [x] Plan just implemented, too soon to assess goals progression <30days   [] Goals require adjustment due to lack of progress  [] Patient is not progressing as expected and requires additional follow up with physician  [] Other    Prognosis for POC: [x] Good [] Fair  [] Poor      Patient requires continued skilled intervention: [x] Yes  [] No    Treatment/Activity Tolerance:  [x] Patient able to complete treatment  [] Patient limited by fatigue  [] Patient limited by pain     [] Patient limited by other medical complications  [x] Other: Pt exhibits improved tolerance to gait with one crutch and is exhibiting improving ROM. Pt will benefit from ? strength and stability as tolerated. Patient Education:  pt to D/C crutches nearly 100% of the time by next visit    Reviewed diagnosis, POC, HEP and its importance. Access Code: [email or web address]  URL: [email or web address]  Date: 09/16/2022  Prepared by: Mark Ceja     Exercises  Long Sitting Ankle Pumps - 8 x daily - 7 x weekly - 3 sets - 10 reps - 1 hold  Long Sitting Calf Stretch with Strap - 2-3 x daily - 7 x weekly - 5 reps - 30 hold  Seated Table Hamstring Stretch - 2-3 x daily - 7 x weekly - 5 reps - 30 hold  Long Sitting Quad Set - 2-3 x daily - 7 x weekly - 10 reps - 10 hold  Active Straight Leg Raise with Quad Set - 2-3 x daily - 7 x weekly - 3 sets - 10 reps  Sidelying Hip Abduction - 2-3 x daily - 7 x weekly - 3 sets - 10 reps  Sitting Heel Slide with Towel - 2-3 x daily - 7 x weekly - 10 reps - 10 hold     PLAN: See eval  [x] Continue per plan of care [] Alter current plan (see comments above)  [] Plan of care initiated [] Hold pending MD visit [] Discharge      Electronically signed by:  Dianna Bowden, PT    Note: If patient does not return for scheduled/ recommended follow up visits, this note will serve as a discharge from care along with most recent update on progress.

## 2022-10-03 ENCOUNTER — HOSPITAL ENCOUNTER (OUTPATIENT)
Dept: PHYSICAL THERAPY | Age: 52
Setting detail: THERAPIES SERIES
Discharge: HOME | End: 2022-10-03
Payer: COMMERCIAL

## 2022-10-03 PROCEDURE — 97110 THERAPEUTIC EXERCISES: CPT

## 2022-10-03 PROCEDURE — 97016 VASOPNEUMATIC DEVICE THERAPY: CPT

## 2022-10-03 NOTE — FLOWSHEET NOTE
[place] [id number] [address]  Phone: [phone]   Fax:     [phone]    Physical Therapy Daily Treatment Note  Date:  10/3/2022    Patient Name:  Florina Hollingsworth    :  1970  MRN: [id number]  Restrictions/Precautions:    Medical/Treatment Diagnosis Information:  Diagnosis: Primary osteoarthritis of left knee (M17.12)  Treatment Diagnosis: L knee pain U70.267  Insurance/Certification information:  PT Insurance Information: [facility]  Physician Information:   Efrain Roach  Has the plan of care been signed (Y/N):        []  Yes  [x]  No     Date of Patient follow up with Physician: per md, mid Oct (not scheduled yet)      Is this a Progress Report:     []  Yes  [x]  No        If Yes:  Date Range for reporting period:  Beginning  Ending    Progress report will be due (10 Rx or 30 days whichever is less):        Recertification will be due (POC Duration  / 90 days whichever is less):          Visit # Insurance Allowable Auth Required   6 New insurance waiting on card []  Yes []  No        Functional Scale: FOTO: 56/100    Date assessed:         Latex Allergy:  [x]NO      []YES  Preferred Language for Healthcare:   [x]English       []other:    Pain level:  2/10     SUBJECTIVE:  Pt reports his knee feels great. He continues to note a reminder feeling of catching however his knee movement actually feels a lot smoother. He has dropped to using no AD. Pt did a lot of walking this past weekend at some [employer] (although he did use one crutch). Pt has not felt the need to use ice recently, uses prn.      OBJECTIVE:   10/3 ROM knee flex 130 ° , knee ext 0 ° very minimal over pressure, after stretching and quad sets  Observation: portal sites healing well; pt amb with no crutches    RESTRICTIONS/PRECAUTIONS: acitivty moderation     Exercises/Interventions:   Exercise/Equipment Resistance/Repetitions Other comments   Stretching     Hamstring [id number]    Towel Pull gastroc [id number]    Inclined Calf     Hip Flexion     ITB     Groin     Quad                    SLR     Supine 3x10 1# ^ 9/26   Abduction 3x10 1# ^9/26   Adduction     Prone     SLR+     clamshell 3x10  Added 9/19   Isometrics     Quad sets [id number]         Patellar Glides     Medial     Superior     Inferior          ROM     Sheet Pulls [id number]    Hang Weights     Passive     Active     Weight Shift     Ankle Pumps hourly                   CKC     Calf raises 3x10 Added 9/19   Wall sits     Step ups 2x10  4\" (single step), up with L, down with L/ up with R, down with R, added 10/3   1 leg stand     Squatting     CC TKE 5\"x15 Added 9/21, CC level 3   Balance: lateral, fwd, bwd 1x10 Added 9/26   bridges 3x10 Added 9/28        PRE     Extension  RANGE:   Flexion  RANGE:        Quantum machines     Leg press      Leg extension     Leg curl          Manual interventions                     Therapeutic Exercise and NMR EXR  [x] (53160) Provided verbal/tactile cueing for activities related to strengthening, flexibility, endurance, ROM for improvements in LE, proximal hip, and core control with self care, mobility, lifting, ambulation.  [] (82102) Provided verbal/tactile cueing for activities related to improving balance, coordination, kinesthetic sense, posture, motor skill, proprioception  to assist with LE, proximal hip, and core control in self care, mobility, lifting, ambulation and eccentric single leg control.      NMR and Therapeutic Activities:    [] (33868 or 51290) Provided verbal/tactile cueing for activities related to improving balance, coordination, kinesthetic sense, posture, motor skill, proprioception and motor activation to allow for proper function of core, proximal hip and LE with self care and ADLs  [] (29929) Gait Re-education- Provided training and instruction to the patient for proper LE, core and proximal hip recruitment and positioning and eccentric body weight control with ambulation re-education including up and down stairs     Home Exercise Program:    [x] (19361) Reviewed/Progressed HEP activities related to strengthening, flexibility, endurance, ROM of core, proximal hip and LE for functional self-care, mobility, lifting and ambulation/stair navigation   [] (88645)Reviewed/Progressed HEP activities related to improving balance, coordination, kinesthetic sense, posture, motor skill, proprioception of core, proximal hip and LE for self care, mobility, lifting, and ambulation/stair navigation      Manual Treatments:  PROM / STM / Oscillations-Mobs:  G-I, II, III, IV (PA's, Inf., Post.)  [x] (44585) Provided manual therapy to mobilize LE, proximal hip and/or LS spine soft tissue/joints for the purpose of modulating pain, promoting relaxation,  increasing ROM, reducing/eliminating soft tissue swelling/inflammation/restriction, improving soft tissue extensibility and allowing for proper ROM for normal function with self care, mobility, lifting and ambulation. Modalities:  vaso 15    Charges:  Timed Code Treatment Minutes: 45   Total Treatment Minutes: 60       [] EVAL (LOW) 01699 (typically 20 minutes face-to-face)  [] EVAL (MOD) 73174 (typically 30 minutes face-to-face)  [] EVAL (HIGH) 41136 (typically 45 minutes face-to-face)  [] RE-EVAL   [x] WA(14017) x  3   [] IONTO  [] NMR (88195) x     [x] VASO  [] Manual (76357) x      [] Other:  [] TA x      [] Mech Traction (28359)  [] ES(attended) (18902)      [] ES (un) (21002):     GOALS:   Patient stated goal: return to community activities     [] Progressing: [] Met: [] Not Met: [] Adjusted     Therapist goals for Patient:   Short Term Goals: To be achieved in: 2-4 weeks  1. Independent in HEP and progression per patient tolerance, in order to prevent re-injury. [] Progressing: [] Met: [] Not Met: [] Adjusted   2.  Patient will have a decrease in pain to facilitate improvement in movement, function, and ADLs as indicated by Functional Deficits. [] Progressing: [] Met: [] Not Met: [] Adjusted     Long Term Goals: To be achieved in: 12 weeks  1. Foto68/100to assist with reaching prior level of function. [] Progressing: [] Met: [] Not Met: [] Adjusted  2. Patient will demonstrate increased AROM to =R to allow for proper joint functioning as indicated by patients Functional Deficits. [] Progressing: [] Met: [] Not Met: [] Adjusted  3. Patient will demonstrate an increase in Strength to good proximal hip strength and control  in LE to allow for proper functional mobility as indicated by patients Functional Deficits. [] Progressing: [] Met: [] Not Met: [] Adjusted  4. Patient will return to normal heel to toe gait in community  ascend descend 12 steps alternating  functional activities without increased symptoms or restriction. [] Progressing: [] Met: [] Not Met: [] Adjusted  Overall Progression Towards Functional goals/ Treatment Progress Update:  [] Patient is progressing as expected towards functional goals listed. [] Progression is slowed due to complexities/Impairments listed. [] Progression has been slowed due to co-morbidities. [x] Plan just implemented, too soon to assess goals progression <30days   [] Goals require adjustment due to lack of progress  [] Patient is not progressing as expected and requires additional follow up with physician  [] Other    Prognosis for POC: [x] Good [] Fair  [] Poor      Patient requires continued skilled intervention: [x] Yes  [] No    Treatment/Activity Tolerance:  [x] Patient able to complete treatment  [] Patient limited by fatigue  [] Patient limited by pain     [] Patient limited by other medical complications  [x] Other: Pt exhibits good gait pattern with no crutches and will benefit from slow progression to WB exercises as strength and pain allows.      Patient Education:     Reviewed diagnosis, POC, HEP and its importance. Access Code: [email or web address]  URL: [email or web address]  Date: 09/16/2022  Prepared by: Makenna Miller     Exercises  Long Sitting Ankle Pumps - 8 x daily - 7 x weekly - 3 sets - 10 reps - 1 hold  Long Sitting Calf Stretch with Strap - 2-3 x daily - 7 x weekly - 5 reps - 30 hold  Seated Table Hamstring Stretch - 2-3 x daily - 7 x weekly - 5 reps - 30 hold  Long Sitting Quad Set - 2-3 x daily - 7 x weekly - 10 reps - 10 hold  Active Straight Leg Raise with Quad Set - 2-3 x daily - 7 x weekly - 3 sets - 10 reps  Sidelying Hip Abduction - 2-3 x daily - 7 x weekly - 3 sets - 10 reps  Sitting Heel Slide with Towel - 2-3 x daily - 7 x weekly - 10 reps - 10 hold     PLAN: See eval  [x] Continue per plan of care [] Alter current plan (see comments above)  [] Plan of care initiated [] Hold pending MD visit [] Discharge      Electronically signed by:  Rebecca Ocampo PT    Note: If patient does not return for scheduled/ recommended follow up visits, this note will serve as a discharge from care along with most recent update on progress.

## 2022-10-05 ENCOUNTER — HOSPITAL ENCOUNTER (OUTPATIENT)
Dept: PHYSICAL THERAPY | Age: 52
Setting detail: THERAPIES SERIES
Discharge: HOME | End: 2022-10-05
Payer: COMMERCIAL

## 2022-10-05 PROCEDURE — 97016 VASOPNEUMATIC DEVICE THERAPY: CPT

## 2022-10-05 PROCEDURE — 97110 THERAPEUTIC EXERCISES: CPT

## 2022-10-05 NOTE — FLOWSHEET NOTE
[facility], [address] [place] [address], [address] [place] [address]  Phone: [phone]   Fax:     [phone]    Physical Therapy Daily Treatment Note  Date:  10/5/2022    Patient Name:  Tawnya Humphreys    :  1970  MRN: [id number]  Restrictions/Precautions:    Medical/Treatment Diagnosis Information:  Diagnosis: Primary osteoarthritis of left knee (M17.12)  Treatment Diagnosis: L knee pain N92.472  Insurance/Certification information:  PT Insurance Information: [facility]  Physician Information:   Sarah Recinos  Has the plan of care been signed (Y/N):        []  Yes  [x]  No     Date of Patient follow up with Physician: per md, mid Oct (not scheduled yet)      Is this a Progress Report:     []  Yes  [x]  No        If Yes:  Date Range for reporting period:  Beginning  Ending    Progress report will be due (10 Rx or 30 days whichever is less):        Recertification will be due (POC Duration  / 90 days whichever is less):          Visit # Insurance Allowable Auth Required   7 New insurance waiting on card []  Yes []  No        Functional Scale: FOTO: 56/100    Date assessed:         Latex Allergy:  [x]NO      []YES  Preferred Language for Healthcare:   [x]English       []other:    Pain level:  2/10     SUBJECTIVE:  Pt reports his knee feels great. He continues to note a reminder feeling of catching however his knee movement actually feels a lot smoother. He has dropped to using no AD. Pt did a lot of walking this past weekend at some [place] (although he did use one crutch). Pt has not felt the need to use ice recently, uses prn.      OBJECTIVE:   10/3 ROM knee flex 130 ° , knee ext 0 ° very minimal over pressure, after stretching and quad sets  Observation: portal sites healing well; pt amb with no crutches    RESTRICTIONS/PRECAUTIONS: acitivty moderation     Exercises/Interventions:   Exercise/Equipment proximal hip recruitment and positioning and eccentric body weight control with ambulation re-education including up and down stairs     Home Exercise Program:    [x] (13054) Reviewed/Progressed HEP activities related to strengthening, flexibility, endurance, ROM of core, proximal hip and LE for functional self-care, mobility, lifting and ambulation/stair navigation   [] (65543)Reviewed/Progressed HEP activities related to improving balance, coordination, kinesthetic sense, posture, motor skill, proprioception of core, proximal hip and LE for self care, mobility, lifting, and ambulation/stair navigation      Manual Treatments:  PROM / STM / Oscillations-Mobs:  G-I, II, III, IV (PA's, Inf., Post.)  [x] (18973) Provided manual therapy to mobilize LE, proximal hip and/or LS spine soft tissue/joints for the purpose of modulating pain, promoting relaxation,  increasing ROM, reducing/eliminating soft tissue swelling/inflammation/restriction, improving soft tissue extensibility and allowing for proper ROM for normal function with self care, mobility, lifting and ambulation. Modalities:  vaso 15    Charges:  Timed Code Treatment Minutes: 45   Total Treatment Minutes: 60       [] EVAL (LOW) 33846 (typically 20 minutes face-to-face)  [] EVAL (MOD) 62905 (typically 30 minutes face-to-face)  [] EVAL (HIGH) 34446 (typically 45 minutes face-to-face)  [] RE-EVAL   [x] CY(36628) x  3   [] IONTO  [] NMR (19342) x     [x] VASO  [] Manual (80132) x      [] Other:  [] TA x      [] Mech Traction (51085)  [] ES(attended) (63112)      [] ES (un) (98684):     GOALS:   Patient stated goal: return to community activities     [] Progressing: [] Met: [] Not Met: [] Adjusted     Therapist goals for Patient:   Short Term Goals: To be achieved in: 2-4 weeks  1. Independent in HEP and progression per patient tolerance, in order to prevent re-injury. [] Progressing: [] Met: [] Not Met: [] Adjusted   2.  Patient will have a decrease in pain to facilitate improvement in movement, function, and ADLs as indicated by Functional Deficits. [] Progressing: [] Met: [] Not Met: [] Adjusted     Long Term Goals: To be achieved in: 12 weeks  1. Foto68/100to assist with reaching prior level of function. [] Progressing: [] Met: [] Not Met: [] Adjusted  2. Patient will demonstrate increased AROM to =R to allow for proper joint functioning as indicated by patients Functional Deficits. [] Progressing: [] Met: [] Not Met: [] Adjusted  3. Patient will demonstrate an increase in Strength to good proximal hip strength and control  in LE to allow for proper functional mobility as indicated by patients Functional Deficits. [] Progressing: [] Met: [] Not Met: [] Adjusted  4. Patient will return to normal heel to toe gait in community  ascend descend 12 steps alternating  functional activities without increased symptoms or restriction. [] Progressing: [] Met: [] Not Met: [] Adjusted  Overall Progression Towards Functional goals/ Treatment Progress Update:  [] Patient is progressing as expected towards functional goals listed. [] Progression is slowed due to complexities/Impairments listed. [] Progression has been slowed due to co-morbidities. [x] Plan just implemented, too soon to assess goals progression <30days   [] Goals require adjustment due to lack of progress  [] Patient is not progressing as expected and requires additional follow up with physician  [] Other    Prognosis for POC: [x] Good [] Fair  [] Poor      Patient requires continued skilled intervention: [x] Yes  [] No    Treatment/Activity Tolerance:  [x] Patient able to complete treatment  [] Patient limited by fatigue  [] Patient limited by pain     [] Patient limited by other medical complications  [x] Other: Pt exhibits good gait pattern with no crutches and will benefit from slow progression to WB exercises as strength and pain allows.      Patient Education:     Reviewed diagnosis, POC, HEP and its importance. Access Code: [email or web address]  URL: [email or web address]  Date: 09/16/2022  Prepared by: Maday Meier     Exercises  Long Sitting Ankle Pumps - 8 x daily - 7 x weekly - 3 sets - 10 reps - 1 hold  Long Sitting Calf Stretch with Strap - 2-3 x daily - 7 x weekly - 5 reps - 30 hold  Seated Table Hamstring Stretch - 2-3 x daily - 7 x weekly - 5 reps - 30 hold  Long Sitting Quad Set - 2-3 x daily - 7 x weekly - 10 reps - 10 hold  Active Straight Leg Raise with Quad Set - 2-3 x daily - 7 x weekly - 3 sets - 10 reps  Sidelying Hip Abduction - 2-3 x daily - 7 x weekly - 3 sets - 10 reps  Sitting Heel Slide with Towel - 2-3 x daily - 7 x weekly - 10 reps - 10 hold     PLAN: See eval  [x] Continue per plan of care [] Alter current plan (see comments above)  [] Plan of care initiated [] Hold pending MD visit [] Discharge      Electronically signed by:  Jamal Rosado PT    Note: If patient does not return for scheduled/ recommended follow up visits, this note will serve as a discharge from care along with most recent update on progress.

## 2022-10-10 ENCOUNTER — HOSPITAL ENCOUNTER (OUTPATIENT)
Dept: PHYSICAL THERAPY | Age: 52
Setting detail: THERAPIES SERIES
Discharge: HOME | End: 2022-10-10
Payer: COMMERCIAL

## 2022-10-10 PROCEDURE — 97016 VASOPNEUMATIC DEVICE THERAPY: CPT

## 2022-10-10 PROCEDURE — 97110 THERAPEUTIC EXERCISES: CPT

## 2022-10-10 NOTE — FLOWSHEET NOTE
[address],  [address]  Phone: [phone]   Fax:     [phone]    Physical Therapy Daily Treatment Note  Date:  10/10/2022    Patient Name:  Natalio Joyce    :  1970  MRN: [id number]  Restrictions/Precautions:    Medical/Treatment Diagnosis Information:  Diagnosis: Primary osteoarthritis of left knee (M17.12)  Treatment Diagnosis: L knee pain S81.152  Insurance/Certification information:  PT Insurance Information: [employer]  Physician Information:   Arsh Raes  Has the plan of care been signed (Y/N):        []  Yes  [x]  No     Date of Patient follow up with Physician: per md, mid Oct (not scheduled yet)      Is this a Progress Report:     []  Yes  [x]  No        If Yes:  Date Range for reporting period:  Beginning  Ending    Progress report will be due (10 Rx or 30 days whichever is less):        Recertification will be due (POC Duration  / 90 days whichever is less):          Visit # Insurance Allowable Auth Required   7 New insurance waiting on card []  Yes []  No        Functional Scale: FOTO: 56/100    Date assessed:         Latex Allergy:  [x]NO      []YES  Preferred Language for Healthcare:   [x]English       []other:    Pain level:  2/10     SUBJECTIVE:  Pt reports his knee feels ok. He continues to note stiffness when changing direction with knee (bend to straight and vice versa).      OBJECTIVE:   10/3 ROM knee flex 130 ° , knee ext 0 ° very minimal over pressure, after stretching and quad sets  Observation: portal sites healing well; pt amb with no crutches; ? pain with LLE initial swing phase of gait     RESTRICTIONS/PRECAUTIONS: acitivty moderation     Exercises/Interventions:   Exercise/Equipment Resistance/Repetitions Other comments   Stretching     Hamstring [id number]    Towel Pull gastroc [id number]    Inclined Calf     Hip Flexion     ITB     Groin     Quad SLR     Supine 3x10 1.5# ^ 10/10   Abduction 3x10 1.5# ^ 10/10   Adduction     Prone     SLR+     clamshell 3x10  Added 9/19   Isometrics     Quad sets [id number]         Patellar Glides     Medial     Superior     Inferior          ROM     Sheet Pulls [id number]    Hang Weights     Passive     Active     Weight Shift                       CKC     Calf raises 3x10 Added 9/19   Wall sits     Step ups 2x10  4\" (single step), up with L, down with L/ up with R, down with R, added 10/3   1 leg stand     Squatting     CC TKE 5\"x20 Added 9/21, CC level 3   bridges 3x10 Added 9/28        PRE     Extension  RANGE:   Flexion  RANGE:        Quantum machines     Leg press      Leg extension     Leg curl          Manual interventions                     Therapeutic Exercise and NMR EXR  [x] (50300) Provided verbal/tactile cueing for activities related to strengthening, flexibility, endurance, ROM for improvements in LE, proximal hip, and core control with self care, mobility, lifting, ambulation.  [] (97536) Provided verbal/tactile cueing for activities related to improving balance, coordination, kinesthetic sense, posture, motor skill, proprioception  to assist with LE, proximal hip, and core control in self care, mobility, lifting, ambulation and eccentric single leg control.      NMR and Therapeutic Activities:    [] (39444 or 52203) Provided verbal/tactile cueing for activities related to improving balance, coordination, kinesthetic sense, posture, motor skill, proprioception and motor activation to allow for proper function of core, proximal hip and LE with self care and ADLs  [] (04216) Gait Re-education- Provided training and instruction to the patient for proper LE, core and proximal hip recruitment and positioning and eccentric body weight control with ambulation re-education including up and down stairs     Home Exercise Program:    [x] (52869) Reviewed/Progressed HEP activities related to strengthening, flexibility, endurance, ROM of core, proximal hip and LE for functional self-care, mobility, lifting and ambulation/stair navigation   [] (56760)Reviewed/Progressed HEP activities related to improving balance, coordination, kinesthetic sense, posture, motor skill, proprioception of core, proximal hip and LE for self care, mobility, lifting, and ambulation/stair navigation      Manual Treatments:  PROM / STM / Oscillations-Mobs:  G-I, II, III, IV (PA's, Inf., Post.)  [x] (92863) Provided manual therapy to mobilize LE, proximal hip and/or LS spine soft tissue/joints for the purpose of modulating pain, promoting relaxation,  increasing ROM, reducing/eliminating soft tissue swelling/inflammation/restriction, improving soft tissue extensibility and allowing for proper ROM for normal function with self care, mobility, lifting and ambulation. Modalities:  vaso 15    Charges:  Timed Code Treatment Minutes: 45   Total Treatment Minutes: 60       [] EVAL (LOW) 95848 (typically 20 minutes face-to-face)  [] EVAL (MOD) 95329 (typically 30 minutes face-to-face)  [] EVAL (HIGH) 63207 (typically 45 minutes face-to-face)  [] RE-EVAL   [x] HS(94525) x  3   [] IONTO  [] NMR (73328) x     [x] VASO  [] Manual (33273) x      [] Other:  [] TA x      [] Mech Traction (83089)  [] ES(attended) (39538)      [] ES (un) (08579):     GOALS:   Patient stated goal: return to community activities     [] Progressing: [] Met: [] Not Met: [] Adjusted     Therapist goals for Patient:   Short Term Goals: To be achieved in: 2-4 weeks  1. Independent in HEP and progression per patient tolerance, in order to prevent re-injury. [] Progressing: [] Met: [] Not Met: [] Adjusted   2. Patient will have a decrease in pain to facilitate improvement in movement, function, and ADLs as indicated by Functional Deficits. [] Progressing: [] Met: [] Not Met: [] Adjusted     Long Term Goals: To be achieved in: 12 weeks  1. Foto68/100to assist with reaching prior level of function. [] Progressing: [] Met: [] Not Met: [] Adjusted  2. Patient will demonstrate increased AROM to =R to allow for proper joint functioning as indicated by patients Functional Deficits. [] Progressing: [] Met: [] Not Met: [] Adjusted  3. Patient will demonstrate an increase in Strength to good proximal hip strength and control  in LE to allow for proper functional mobility as indicated by patients Functional Deficits. [] Progressing: [] Met: [] Not Met: [] Adjusted  4. Patient will return to normal heel to toe gait in community  ascend descend 12 steps alternating  functional activities without increased symptoms or restriction. [] Progressing: [] Met: [] Not Met: [] Adjusted  Overall Progression Towards Functional goals/ Treatment Progress Update:  [x] Patient is progressing as expected towards functional goals listed. [] Progression is slowed due to complexities/Impairments listed. [] Progression has been slowed due to co-morbidities. [] Plan just implemented, too soon to assess goals progression <30days   [] Goals require adjustment due to lack of progress  [] Patient is not progressing as expected and requires additional follow up with physician  [] Other    Prognosis for POC: [x] Good [] Fair  [] Poor      Patient requires continued skilled intervention: [x] Yes  [] No    Treatment/Activity Tolerance:  [x] Patient able to complete treatment  [] Patient limited by fatigue  [] Patient limited by pain     [] Patient limited by other medical complications  [x] Other: Pt exhibits good gait pattern with no crutches and will benefit from slow progression to WB exercises as strength and pain allows. Patient Education:     Reviewed diagnosis, POC, HEP and its importance. Access Code: [email or web address]  URL: [email or web address]  Date: 09/16/2022  Prepared by: Brayan Aguirre     Exercises  Long Sitting Ankle Pumps - 8 x daily - 7 x weekly - 3 sets - 10 reps - 1 hold  Long Sitting Calf Stretch with Strap - 2-3 x daily - 7 x weekly - 5 reps - 30 hold  Seated Table Hamstring Stretch - 2-3 x daily - 7 x weekly - 5 reps - 30 hold  Long Sitting Quad Set - 2-3 x daily - 7 x weekly - 10 reps - 10 hold  Active Straight Leg Raise with Quad Set - 2-3 x daily - 7 x weekly - 3 sets - 10 reps  Sidelying Hip Abduction - 2-3 x daily - 7 x weekly - 3 sets - 10 reps  Sitting Heel Slide with Towel - 2-3 x daily - 7 x weekly - 10 reps - 10 hold     PLAN: See eval  [x] Continue per plan of care [] Alter current plan (see comments above)  [] Plan of care initiated [] Hold pending MD visit [] Discharge      Electronically signed by:  Betsy Urias PT    Note: If patient does not return for scheduled/ recommended follow up visits, this note will serve as a discharge from care along with most recent update on progress.

## 2022-10-12 ENCOUNTER — HOSPITAL ENCOUNTER (OUTPATIENT)
Dept: PHYSICAL THERAPY | Age: 52
Setting detail: THERAPIES SERIES
Discharge: HOME | End: 2022-10-12
Payer: COMMERCIAL

## 2022-10-12 PROCEDURE — 97110 THERAPEUTIC EXERCISES: CPT

## 2022-10-12 PROCEDURE — 97016 VASOPNEUMATIC DEVICE THERAPY: CPT

## 2022-10-12 NOTE — FLOWSHEET NOTE
[facility], [address] [place] [address], [address] [place] [address]  Phone: [phone]   Fax:     [phone]    Physical Therapy Daily Treatment Note  Date:  10/12/2022    Patient Name:  [address]all [address]    :  1970  MRN: [id number]  Restrictions/Precautions:    Medical/Treatment Diagnosis Information:  Diagnosis: Primary osteoarthritis of left knee (M17.12)  Treatment Diagnosis: L knee pain Q11.167  Insurance/Certification information:  PT Insurance Information: [facility]  Physician Information:   Gogo Cheng  Has the plan of care been signed (Y/N):        []  Yes  [x]  No     Date of Patient follow up with Physician: per md, mid Oct (not scheduled yet)      Is this a Progress Report:     []  Yes  [x]  No        If Yes:  Date Range for reporting period:  Beginning  Ending    Progress report will be due (10 Rx or 30 days whichever is less):        Recertification will be due (POC Duration  / 90 days whichever is less):          Visit # Insurance Allowable Auth Required   8 New insurance waiting on card []  Yes []  No        Functional Scale: FOTO: 56/100    Date assessed:         Latex Allergy:  [x]NO      []YES  Preferred Language for Healthcare:   [x]English       []other:    Pain level:  2/10     SUBJECTIVE:  Pt states when his knee is fully straight he feels an uncomfortable tightness. Pt also notes it is difficult to change positions after being in one position for 5 min. However pt states he feels like tightness is getting better.      OBJECTIVE:   10/3 ROM knee flex 130 ° , knee ext 0 ° very minimal over pressure, after stretching and quad sets  Observation: portal sites healing well; pt amb with no crutches; ? pain with LLE initial swing phase of gait     RESTRICTIONS/PRECAUTIONS: acitivty moderation     Exercises/Interventions:   Exercise/Equipment Resistance/Repetitions Other comments   Stretching     Hamstring [id number] Towel Pull gastroc [id number] slant   Inclined Calf     Hip Flexion     ITB     Groin     Quad S prone 30\"x5 NV                  SLR     Supine 3x10 1.5# ^ 10/10   Abduction 3x10 1.5# ^ 10/10   Adduction     Prone     SLR+     clamshell 3x10  Added 9/19, Blue TB   Isometrics     Quad sets [id number]         Patellar Glides     Medial     Superior     Inferior          ROM     Sheet Pulls [id number]    Hang Weights     Passive     Active     Weight Shift                       CKC     Calf raises 3x10 Added 9/19   Wall sits     Step ups 2x10  4\" (single step), up with L, down with L/ up with R, down with R, added 10/3   1 leg stand 20\"x3 Added 10/5   Squatting     CC TKE 5\"x20 Added 9/21, CC level 3   bridges 10\"x10 Added 9/28, changed 10/12        PRE     Extension  RANGE:   Flexion  RANGE:        Quantum machines     Leg press      Leg extension     Leg curl          Manual interventions                     Therapeutic Exercise and NMR EXR  [x] (67891) Provided verbal/tactile cueing for activities related to strengthening, flexibility, endurance, ROM for improvements in LE, proximal hip, and core control with self care, mobility, lifting, ambulation.  [] (38505) Provided verbal/tactile cueing for activities related to improving balance, coordination, kinesthetic sense, posture, motor skill, proprioception  to assist with LE, proximal hip, and core control in self care, mobility, lifting, ambulation and eccentric single leg control.      NMR and Therapeutic Activities:    [] (62283 or 06023) Provided verbal/tactile cueing for activities related to improving balance, coordination, kinesthetic sense, posture, motor skill, proprioception and motor activation to allow for proper function of core, proximal hip and LE with self care and ADLs  [] (50658) Gait Re-education- Provided training and instruction to the patient for proper LE, core and proximal hip recruitment and positioning and eccentric body weight control with ambulation re-education including up and down stairs     Home Exercise Program:    [x] (77064) Reviewed/Progressed HEP activities related to strengthening, flexibility, endurance, ROM of core, proximal hip and LE for functional self-care, mobility, lifting and ambulation/stair navigation   [] (14473)Reviewed/Progressed HEP activities related to improving balance, coordination, kinesthetic sense, posture, motor skill, proprioception of core, proximal hip and LE for self care, mobility, lifting, and ambulation/stair navigation      Manual Treatments:  PROM / STM / Oscillations-Mobs:  G-I, II, III, IV (PA's, Inf., Post.)  [x] (29296) Provided manual therapy to mobilize LE, proximal hip and/or LS spine soft tissue/joints for the purpose of modulating pain, promoting relaxation,  increasing ROM, reducing/eliminating soft tissue swelling/inflammation/restriction, improving soft tissue extensibility and allowing for proper ROM for normal function with self care, mobility, lifting and ambulation. Modalities:  vaso 15    Charges:  Timed Code Treatment Minutes: 45   Total Treatment Minutes: 60       [] EVAL (LOW) 38777 (typically 20 minutes face-to-face)  [] EVAL (MOD) 34792 (typically 30 minutes face-to-face)  [] EVAL (HIGH) 77191 (typically 45 minutes face-to-face)  [] RE-EVAL   [x] MZ(33123) x  3   [] IONTO  [] NMR (12897) x     [x] VASO  [] Manual (01026) x      [] Other:  [] TA x      [] Mech Traction (00281)  [] ES(attended) (67991)      [] ES (un) (95570):     GOALS:   Patient stated goal: return to community activities     [] Progressing: [] Met: [] Not Met: [] Adjusted     Therapist goals for Patient:   Short Term Goals: To be achieved in: 2-4 weeks  1. Independent in HEP and progression per patient tolerance, in order to prevent re-injury. [] Progressing: [] Met: [] Not Met: [] Adjusted   2.  Patient will have a decrease in pain to facilitate improvement in movement, function, and ADLs as indicated by Functional Deficits. [] Progressing: [] Met: [] Not Met: [] Adjusted     Long Term Goals: To be achieved in: 12 weeks  1. Foto68/100to assist with reaching prior level of function. [] Progressing: [] Met: [] Not Met: [] Adjusted  2. Patient will demonstrate increased AROM to =R to allow for proper joint functioning as indicated by patients Functional Deficits. [] Progressing: [] Met: [] Not Met: [] Adjusted  3. Patient will demonstrate an increase in Strength to good proximal hip strength and control  in LE to allow for proper functional mobility as indicated by patients Functional Deficits. [] Progressing: [] Met: [] Not Met: [] Adjusted  4. Patient will return to normal heel to toe gait in community  ascend descend 12 steps alternating  functional activities without increased symptoms or restriction. [] Progressing: [] Met: [] Not Met: [] Adjusted  Overall Progression Towards Functional goals/ Treatment Progress Update:  [x] Patient is progressing as expected towards functional goals listed. [] Progression is slowed due to complexities/Impairments listed. [] Progression has been slowed due to co-morbidities. [] Plan just implemented, too soon to assess goals progression <30days   [] Goals require adjustment due to lack of progress  [] Patient is not progressing as expected and requires additional follow up with physician  [] Other    Prognosis for POC: [x] Good [] Fair  [] Poor      Patient requires continued skilled intervention: [x] Yes  [] No    Treatment/Activity Tolerance:  [x] Patient able to complete treatment  [] Patient limited by fatigue  [] Patient limited by pain     [] Patient limited by other medical complications  [x] Other: Pt exhibits good gait pattern with no crutches and will benefit from slow progression to WB exercises as strength and pain allows. Patient Education:     Reviewed diagnosis, POC, HEP and its importance.                 Access Code: [email or web address]  URL: [email or web address]  Date: 09/16/2022  Prepared by: Estela Giles     Exercises  Long Sitting Ankle Pumps - 8 x daily - 7 x weekly - 3 sets - 10 reps - 1 hold  Long Sitting Calf Stretch with Strap - 2-3 x daily - 7 x weekly - 5 reps - 30 hold  Seated Table Hamstring Stretch - 2-3 x daily - 7 x weekly - 5 reps - 30 hold  Long Sitting Quad Set - 2-3 x daily - 7 x weekly - 10 reps - 10 hold  Active Straight Leg Raise with Quad Set - 2-3 x daily - 7 x weekly - 3 sets - 10 reps  Sidelying Hip Abduction - 2-3 x daily - 7 x weekly - 3 sets - 10 reps  Sitting Heel Slide with Towel - 2-3 x daily - 7 x weekly - 10 reps - 10 hold     PLAN: See eval  [x] Continue per plan of care [] Alter current plan (see comments above)  [] Plan of care initiated [] Hold pending MD visit [] Discharge      Electronically signed by:  Duyen Obando PT    Note: If patient does not return for scheduled/ recommended follow up visits, this note will serve as a discharge from care along with most recent update on progress.

## 2022-10-14 ENCOUNTER — OFFICE VISIT (OUTPATIENT)
Dept: ORTHOPEDIC SURGERY | Age: 52
End: 2022-10-14

## 2022-10-14 VITALS — BODY MASS INDEX: 28 KG/M2 | WEIGHT: 200 LBS | HEIGHT: 71 IN

## 2022-10-14 DIAGNOSIS — Z98.890 S/P LEFT KNEE ARTHROSCOPY: Primary | ICD-10-CM

## 2022-10-14 PROCEDURE — 99024 POSTOP FOLLOW-UP VISIT: CPT | Performed by: ORTHOPAEDIC SURGERY

## 2022-10-14 NOTE — PROGRESS NOTES
Chief Complaint  Follow-up (LEFT KNEE)      History of Present Illness:  Triston Mccain is a pleasant 50 y.o. male presents status post right knee arthroscopy with chondroplasty of the patella and trochlear groove, synovectomy 9/15/2022 with Dr. Azar Lerma. Patient is doing well and states he is completely off postoperative medicine. He can bend his knee without catching, locking, or instability. Denies numbness, tingling, decrease sensation or diminished motor function in the extremity      Medical History:  Patient's medications, allergies, past medical, surgical, social and family histories were reviewed and updated as appropriate. Pertinent items are noted in HPI  Review of systems reviewed from Patient History Form dated on 10/14/2022   and available in the patient's chart under the Media tab. Vital Signs: There were no vitals filed for this visit. Neuro: Alert & oriented x 3,  normal,  no focal deficits noted. Normal affect. Eyes: sclera clear  Ears: Normal external ear  Mouth:  No perioral lesions  Pulm: Respirations unlabored and regular  Pulse: Regular rate and rhythm   Skin: Warm, well perfused      Constitutional: In no apparent distress. Normal affect. Alert and oriented X3 and is cooperative. Left knee exam    Gait: No use of assistive devices. No antalgic gait. Alignment: normal alignment. Inspection/skin: Skin is intact without erythema or ecchymosis. No gross deformity. Incisions well-healed    Palpation: Mild patellofemoral crepitance. no joint line tenderness present. Range of Motion: There is full range of motion. Strength: Normal quadriceps development. Effusion: No effusion or swelling present. Ligamentous stability: No cruciate or collateral ligament instability. Neurologic and vascular: Skin is warm and well-perfused. Sensation is intact to light-touch. Special tests: Negative Gena sign.          Radiology:     No new images obtained, previous imaging reviewed. Assessment : 68-year-old male status post right knee arthroscopy with chondroplasty of the patella and trochlear groove, synovectomy 9/15/2022    Impression:  Encounter Diagnosis   Name Primary? S/P left knee arthroscopy Yes       Office Procedures:  No orders of the defined types were placed in this encounter. Plan: Patient is doing well postoperatively from right knee arthroscopy with chondroplasty of the patella and trochlear groove, synovectomy 9/15/2022. He may continue with activities as tolerated. Continue therapy as scheduled. We will see him as needed. Adwoa Rodriguez is in agreement with this plan. All questions were answered to patient's satisfaction and was encouraged to call with any further questions. Mary Doe D.O. Orthopedic Surgeon, [facility] Fellow    Total time spent for evaluation, education, and development of treatment plan: 30 minutes        This dictation was performed with a verbal recognition program (DRAGON) and it was checked for errors. It is possible that there are still dictated errors within this office note. If so, please bring any errors to my attention for an addendum. All efforts were made to ensure that this office note is accurate. I attest that I met personally with the patient, performed the described exam, reviewed the radiographic studies and medical records associated with this patient and supervised the services that are described above.      Reece Holden MD

## 2022-10-17 ENCOUNTER — HOSPITAL ENCOUNTER (OUTPATIENT)
Dept: PHYSICAL THERAPY | Age: 52
Setting detail: THERAPIES SERIES
Discharge: HOME | End: 2022-10-17
Payer: COMMERCIAL

## 2022-10-17 PROCEDURE — 97110 THERAPEUTIC EXERCISES: CPT

## 2022-10-17 PROCEDURE — 97016 VASOPNEUMATIC DEVICE THERAPY: CPT

## 2022-10-17 NOTE — FLOWSHEET NOTE
[facility] [address], [address] [facility] [address] [place] [address], [address] [place] [address]  Phone: [phone]   Fax:     [phone]    Physical Therapy Daily Treatment Note  Date:  10/17/2022    Patient Name:  Rusty Roberson    :  1970  MRN: [id number]  Restrictions/Precautions:    Medical/Treatment Diagnosis Information:  Diagnosis: Primary osteoarthritis of left knee (M17.12)  Treatment Diagnosis: L knee pain O39.952  Insurance/Certification information:  PT Insurance Information: [employer]  Physician Information:   Jadiel Mix  Has the plan of care been signed (Y/N):        []  Yes  [x]  No     Date of Patient follow up with Physician: per md, mid Oct (not scheduled yet)      Is this a Progress Report:     []  Yes  [x]  No        If Yes:  Date Range for reporting period:  Beginning  Ending    Progress report will be due (10 Rx or 30 days whichever is less):        Recertification will be due (POC Duration  / 90 days whichever is less):          Visit # Insurance Allowable Auth Required   9 New insurance waiting on card []  Yes []  No        Functional Scale: FOTO: 56/100    Date assessed:         Latex Allergy:  [x]NO      []YES  Preferred Language for Healthcare:   [x]English       []other:    Pain level:  2/10     SUBJECTIVE:  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 Pt notes his biggest issues is feeling a little unstable with knee locked straight. Even in quad set he avoids fully locking knee straight. Pt saw MD and per pt, he is doing well and should progress strengthening as tolerated.      OBJECTIVE:   10/3 ROM knee flex 130 ° , knee ext 0 ° very minimal over pressure, after stretching and quad sets  Observation: portal sites healing well; pt amb with no crutches; ? pain with LLE initial swing phase of gait     RESTRICTIONS/PRECAUTIONS: acitivty moderation Exercises/Interventions:   Exercise/Equipment Resistance/Repetitions Other comments   Stretching     Hamstring [id number]    Towel Pull gastroc [id number] slant   Inclined Calf     Hip Flexion     ITB     Groin     Quad S prone 30\"x5                   SLR     Supine 3x10 1.5# ^ 10/10   Abduction 3x10 1.5# ^ 10/10   Adduction     Prone     SLR+     clamshell 3x10  ^ 10/17, black TB   Isometrics     Quad sets [id number] 1st few are activating quad without pushing down, last reps are quad set + active knee ext        Patellar Glides     Medial     Superior     Inferior          ROM     Sheet Pulls [id number]    Hang Weights     Passive     Active     Weight Shift                       CKC     Calf raises 3x10 Added 9/19   Wall sits     1 leg stand 20\"x3 Added 10/5   Squatting     CC TKE 5\"x20 ^ 10/17 CC level 4   bridges 10\"x10 Added 9/28, changed 10/12        PRE     Extension  RANGE:   Flexion  RANGE:        Quantum machines     Leg press      Leg extension     Leg curl          Manual interventions                     Therapeutic Exercise and NMR EXR  [x] (57920) Provided verbal/tactile cueing for activities related to strengthening, flexibility, endurance, ROM for improvements in LE, proximal hip, and core control with self care, mobility, lifting, ambulation.  [] (91123) Provided verbal/tactile cueing for activities related to improving balance, coordination, kinesthetic sense, posture, motor skill, proprioception  to assist with LE, proximal hip, and core control in self care, mobility, lifting, ambulation and eccentric single leg control.      NMR and Therapeutic Activities:    [] (54901 or 17430) Provided verbal/tactile cueing for activities related to improving balance, coordination, kinesthetic sense, posture, motor skill, proprioception and motor activation to allow for proper function of core, proximal hip and LE with self care and ADLs  [] (68805) Gait Re-education- Provided training and instruction to the patient for proper LE, core and proximal hip recruitment and positioning and eccentric body weight control with ambulation re-education including up and down stairs     Home Exercise Program:    [x] (87617) Reviewed/Progressed HEP activities related to strengthening, flexibility, endurance, ROM of core, proximal hip and LE for functional self-care, mobility, lifting and ambulation/stair navigation   [] (29263)Reviewed/Progressed HEP activities related to improving balance, coordination, kinesthetic sense, posture, motor skill, proprioception of core, proximal hip and LE for self care, mobility, lifting, and ambulation/stair navigation      Manual Treatments:  PROM / STM / Oscillations-Mobs:  G-I, II, III, IV (PA's, Inf., Post.)  [x] (25345) Provided manual therapy to mobilize LE, proximal hip and/or LS spine soft tissue/joints for the purpose of modulating pain, promoting relaxation,  increasing ROM, reducing/eliminating soft tissue swelling/inflammation/restriction, improving soft tissue extensibility and allowing for proper ROM for normal function with self care, mobility, lifting and ambulation. Modalities:  vaso 15    Charges:  Timed Code Treatment Minutes: 45   Total Treatment Minutes: 60       [] EVAL (LOW) 94889 (typically 20 minutes face-to-face)  [] EVAL (MOD) 96676 (typically 30 minutes face-to-face)  [] EVAL (HIGH) 15701 (typically 45 minutes face-to-face)  [] RE-EVAL   [x] VC(55788) x  3   [] IONTO  [] NMR (58011) x     [x] VASO  [] Manual (67000) x      [] Other:  [] TA x      [] Mech Traction (88857)  [] ES(attended) (98655)      [] ES (un) (06392):     GOALS:   Patient stated goal: return to community activities     [] Progressing: [] Met: [] Not Met: [] Adjusted     Therapist goals for Patient:   Short Term Goals: To be achieved in: 2-4 weeks  1. Independent in HEP and progression per patient tolerance, in order to prevent re-injury. [] Progressing: [] Met: [] Not Met: [] Adjusted   2. Patient will have a decrease in pain to facilitate improvement in movement, function, and ADLs as indicated by Functional Deficits. [] Progressing: [] Met: [] Not Met: [] Adjusted     Long Term Goals: To be achieved in: 12 weeks  1. Foto68/100to assist with reaching prior level of function. [] Progressing: [] Met: [] Not Met: [] Adjusted  2. Patient will demonstrate increased AROM to =R to allow for proper joint functioning as indicated by patients Functional Deficits. [] Progressing: [] Met: [] Not Met: [] Adjusted  3. Patient will demonstrate an increase in Strength to good proximal hip strength and control  in LE to allow for proper functional mobility as indicated by patients Functional Deficits. [] Progressing: [] Met: [] Not Met: [] Adjusted  4. Patient will return to normal heel to toe gait in community  ascend descend 12 steps alternating  functional activities without increased symptoms or restriction. [] Progressing: [] Met: [] Not Met: [] Adjusted  Overall Progression Towards Functional goals/ Treatment Progress Update:  [x] Patient is progressing as expected towards functional goals listed. [] Progression is slowed due to complexities/Impairments listed. [] Progression has been slowed due to co-morbidities. [] Plan just implemented, too soon to assess goals progression <30days   [] Goals require adjustment due to lack of progress  [] Patient is not progressing as expected and requires additional follow up with physician  [] Other    Prognosis for POC: [x] Good [] Fair  [] Poor      Patient requires continued skilled intervention: [x] Yes  [] No    Treatment/Activity Tolerance:  [x] Patient able to complete treatment  [] Patient limited by fatigue  [] Patient limited by pain     [] Patient limited by other medical complications  [x] Other: Pt exhibits good gait pattern with no crutches and will benefit from slow progression to WB exercises as strength and pain allows.      Patient Education:     Reviewed diagnosis, POC, HEP and its importance. Access Code: [email or web address]  URL: [email or web address]  Date: 09/16/2022  Prepared by: Osmany Mota     Exercises  Long Sitting Ankle Pumps - 8 x daily - 7 x weekly - 3 sets - 10 reps - 1 hold  Long Sitting Calf Stretch with Strap - 2-3 x daily - 7 x weekly - 5 reps - 30 hold  Seated Table Hamstring Stretch - 2-3 x daily - 7 x weekly - 5 reps - 30 hold  Long Sitting Quad Set - 2-3 x daily - 7 x weekly - 10 reps - 10 hold  Active Straight Leg Raise with Quad Set - 2-3 x daily - 7 x weekly - 3 sets - 10 reps  Sidelying Hip Abduction - 2-3 x daily - 7 x weekly - 3 sets - 10 reps  Sitting Heel Slide with Towel - 2-3 x daily - 7 x weekly - 10 reps - 10 hold     PLAN: See eval  [x] Continue per plan of care [] Alter current plan (see comments above)  [] Plan of care initiated [] Hold pending MD visit [] Discharge      Electronically signed by:  Nica Jean PT    Note: If patient does not return for scheduled/ recommended follow up visits, this note will serve as a discharge from care along with most recent update on progress.

## 2022-10-19 ENCOUNTER — HOSPITAL ENCOUNTER (OUTPATIENT)
Dept: PHYSICAL THERAPY | Age: 52
Setting detail: THERAPIES SERIES
Discharge: HOME | End: 2022-10-19
Payer: COMMERCIAL

## 2022-10-19 PROCEDURE — 97016 VASOPNEUMATIC DEVICE THERAPY: CPT

## 2022-10-19 PROCEDURE — 97110 THERAPEUTIC EXERCISES: CPT

## 2022-10-19 NOTE — PROGRESS NOTES
[facility], [address] [employer] [address] [place] [address], [address] [place] [address]  Phone: [phone]   Fax:     [phone]   Physical Therapy Re-Certification Plan of Care    Dear  Dr. Andreia Garber,    We had the pleasure of treating the following patient for physical therapy services at [address] [place] [address]. A summary of our findings can be found in the updated assessment below. This includes our plan of care. If you have any questions or concerns regarding these findings, please do not hesitate to contact me at the office phone number checked above. Thank you for the referral.     Physician Signature:________________________________Date:__________________  By signing above (or electronic signature), therapists plan is approved by physician    Date Range Of Visits:  - 10/19  Total Visits to Date: 10  Overall Response to Treatment:   [x]Patient is responding well to treatment and improvement is noted with regards  to goals   []Patient should continue to improve in reasonable time if they continue HEP   []Patient has plateaued and is no longer responding to skilled PT intervention    []Patient is getting worse and would benefit from return to referring MD   []Patient unable to adhere to initial POC   [x]Other: Pt exhibits improvement overall improvement with WNL ROM and improving strength and gait pattern. Pt continues to endorse some pain over anterior/lateral knee however this is greatly improved compared to prior to surgery. Pt will benefit from continued ? strength and stability.      Physical Therapy Daily Treatment Note  Date:  10/19/2022    Patient Name:  Bisi Garrison    :  1970  MRN: [id number]  Restrictions/Precautions:    Medical/Treatment Diagnosis Information:  Diagnosis: Primary osteoarthritis of left knee (M17.12)  Treatment Diagnosis: L knee pain R28.596  Insurance/Certification information:  PT Insurance Information: BC[employer]  Physician Information:   Arsh Alaniz  Has the plan of care been signed (Y/N):        []  Yes  [x]  No     Date of Patient follow up with Physician: per md, mid Oct (not scheduled yet)      Is this a Progress Report:     []  Yes  [x]  No        If Yes:  Date Range for reporting period:  Beginning9/16  Ending    Progress report will be due (10 Rx or 30 days whichever is less): 96/27       Recertification will be due (POC Duration  / 90 days whichever is less): 12/16         Visit # Insurance Allowable Auth Required   10 New insurance waiting on card []  Yes []  No        Functional Scale: FOTO: 55/100    Date assessed:  10/19       Latex Allergy:  [x]NO      []YES  Preferred Language for Healthcare:   [x]English       []other:    Pain level:  2/10     SUBJECTIVE:  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 Pt notes his biggest issues is feeling a little unstable with knee locked straight. Even in quad set he avoids fully locking knee straight. Pt saw MD and per pt, he is doing well and should progress strengthening as tolerated.      OBJECTIVE:   10/19 ROM knee flex 140 ° , knee ext 0 °   Observation: portal sites healing well; pt amb with no crutches; ? pain (although improved) with LLE initial swing phase of gait     RESTRICTIONS/PRECAUTIONS: acitivty moderation     Exercises/Interventions:   Exercise/Equipment Resistance/Repetitions Other comments   Stretching     Hamstring [id number]    Towel Pull gastroc [id number] slant   Inclined Calf     Hip Flexion     ITB     Groin     Quad S prone 30\"x5                   SLR     Supine 3x10 2# ^ 10/19   Abduction 3x10 2# ^ 10/19   Adduction     Prone     SLR+     clamshell 3x10  ^ 10/17, black TB   Isometrics     Quad sets [id number] Able to tolerate pushing down for all reps        Patellar Glides     Medial     Superior     Inferior          ROM        Hang Weights     Passive     Active     Weight Shift CKC     Calf raises 3x10 Added 9/19   Wall sits     1 leg stand 30\"x3 Added 10/5   Squatting     CC TKE 5\"x20 ^ 10/17 CC level 4   bridges 10\"x10 Added 9/28, changed 10/12        PRE     Extension  RANGE:   Flexion  RANGE:        Quantum machines     Leg press      Leg extension     Leg curl          Manual interventions                     Therapeutic Exercise and NMR EXR  [x] (10943) Provided verbal/tactile cueing for activities related to strengthening, flexibility, endurance, ROM for improvements in LE, proximal hip, and core control with self care, mobility, lifting, ambulation.  [] (46597) Provided verbal/tactile cueing for activities related to improving balance, coordination, kinesthetic sense, posture, motor skill, proprioception  to assist with LE, proximal hip, and core control in self care, mobility, lifting, ambulation and eccentric single leg control.      NMR and Therapeutic Activities:    [] (28373 or 77124) Provided verbal/tactile cueing for activities related to improving balance, coordination, kinesthetic sense, posture, motor skill, proprioception and motor activation to allow for proper function of core, proximal hip and LE with self care and ADLs  [] (04899) Gait Re-education- Provided training and instruction to the patient for proper LE, core and proximal hip recruitment and positioning and eccentric body weight control with ambulation re-education including up and down stairs     Home Exercise Program:    [x] (96182) Reviewed/Progressed HEP activities related to strengthening, flexibility, endurance, ROM of core, proximal hip and LE for functional self-care, mobility, lifting and ambulation/stair navigation   [] (48083)Reviewed/Progressed HEP activities related to improving balance, coordination, kinesthetic sense, posture, motor skill, proprioception of core, proximal hip and LE for self care, mobility, lifting, and ambulation/stair navigation      Manual Treatments: PROM / STM / Oscillations-Mobs:  G-I, II, III, IV (PA's, Inf., Post.)  [x] (02360) Provided manual therapy to mobilize LE, proximal hip and/or LS spine soft tissue/joints for the purpose of modulating pain, promoting relaxation,  increasing ROM, reducing/eliminating soft tissue swelling/inflammation/restriction, improving soft tissue extensibility and allowing for proper ROM for normal function with self care, mobility, lifting and ambulation. Modalities:  vaso 10    Charges:  Timed Code Treatment Minutes: 45   Total Treatment Minutes: 55       [] EVAL (LOW) 95063 (typically 20 minutes face-to-face)  [] EVAL (MOD) 62352 (typically 30 minutes face-to-face)  [] EVAL (HIGH) 79669 (typically 45 minutes face-to-face)  [] RE-EVAL   [x] RC(42079) x  3   [] IONTO  [] NMR (72318) x     [x] VASO  [] Manual (85540) x      [] Other:  [] TA x      [] Mech Traction (50943)  [] ES(attended) (61336)      [] ES (un) (68125):     GOALS:   Patient stated goal: return to community activities     [] Progressing: [] Met: [] Not Met: [] Adjusted     Therapist goals for Patient:   Short Term Goals: To be achieved in: 2-4 weeks  1. Independent in HEP and progression per patient tolerance, in order to prevent re-injury. [] Progressing: [] Met: [] Not Met: [] Adjusted   2. Patient will have a decrease in pain to facilitate improvement in movement, function, and ADLs as indicated by Functional Deficits. [] Progressing: [] Met: [] Not Met: [] Adjusted     Long Term Goals: To be achieved in: 12 weeks  1. Foto68/100to assist with reaching prior level of function. [] Progressing: [] Met: [] Not Met: [] Adjusted  2. Patient will demonstrate increased AROM to =R to allow for proper joint functioning as indicated by patients Functional Deficits. [] Progressing: [] Met: [] Not Met: [] Adjusted  3.  Patient will demonstrate an increase in Strength to good proximal hip strength and control  in LE to allow for proper functional mobility as indicated by patients Functional Deficits. [] Progressing: [] Met: [] Not Met: [] Adjusted  4. Patient will return to normal heel to toe gait in community  ascend descend 12 steps alternating  functional activities without increased symptoms or restriction. [] Progressing: [] Met: [] Not Met: [] Adjusted  Overall Progression Towards Functional goals/ Treatment Progress Update:  [x] Patient is progressing as expected towards functional goals listed. [] Progression is slowed due to complexities/Impairments listed. [] Progression has been slowed due to co-morbidities. [] Plan just implemented, too soon to assess goals progression <30days   [] Goals require adjustment due to lack of progress  [] Patient is not progressing as expected and requires additional follow up with physician  [] Other    Prognosis for POC: [x] Good [] Fair  [] Poor      Patient requires continued skilled intervention: [x] Yes  [] No    Treatment/Activity Tolerance:  [x] Patient able to complete treatment  [] Patient limited by fatigue  [] Patient limited by pain     [] Patient limited by other medical complications  [x] Other: Pt exhibits good gait pattern with no crutches and will benefit from slow progression to WB exercises as strength and pain allows. Patient Education:     Reviewed diagnosis, POC, HEP and its importance. Access Code: [email or web address]  URL: [email or web address]  Date: 09/16/2022  Prepared by: Liban Black     Exercises  Long Sitting Ankle Pumps - 8 x daily - 7 x weekly - 3 sets - 10 reps - 1 hold  Long Sitting Calf Stretch with Strap - 2-3 x daily - 7 x weekly - 5 reps - 30 hold  Seated Table Hamstring Stretch - 2-3 x daily - 7 x weekly - 5 reps - 30 hold  Long Sitting Quad Set - 2-3 x daily - 7 x weekly - 10 reps - 10 hold  Active Straight Leg Raise with Quad Set - 2-3 x daily - 7 x weekly - 3 sets - 10 reps  Sidelying Hip Abduction - 2-3 x daily - 7 x weekly - 3 sets - 10 reps  Sitting Heel Slide with Towel - 2-3 x daily - 7 x weekly - 10 reps - 10 hold     PLAN: See eval  [x] Continue per plan of care [] Alter current plan (see comments above)  [] Plan of care initiated [] Hold pending MD visit [] Discharge      Electronically signed by:  Js Doty PT    Note: If patient does not return for scheduled/ recommended follow up visits, this note will serve as a discharge from care along with most recent update on progress.

## 2022-10-24 ENCOUNTER — HOSPITAL ENCOUNTER (OUTPATIENT)
Dept: PHYSICAL THERAPY | Age: 52
Setting detail: THERAPIES SERIES
Discharge: HOME | End: 2022-10-24
Payer: COMMERCIAL

## 2022-10-24 PROCEDURE — 97110 THERAPEUTIC EXERCISES: CPT

## 2022-10-24 PROCEDURE — 97112 NEUROMUSCULAR REEDUCATION: CPT

## 2022-10-24 NOTE — FLOWSHEET NOTE
The [address] [place] [address],[address], [address], [address] [place] [address]  Phone: [phone]   Fax:     [phone]   Physical Therapy Re-Certification Plan of Care      Physical Therapy Daily Treatment Note  Date:  10/24/2022    Patient Name:  Guille Tinsley    :  1970  MRN: [id number]  Restrictions/Precautions:    Medical/Treatment Diagnosis Information:  Diagnosis: Primary osteoarthritis of left knee (M17.12)  Treatment Diagnosis: L knee pain Q41.355  Insurance/Certification information:  PT Insurance Information: [facility]  Physician Information:   Rosario Portillo  Has the plan of care been signed (Y/N):        []  Yes  [x]  No     Date of Patient follow up with Physician: per md, mid Oct (not scheduled yet)      Is this a Progress Report:     []  Yes  [x]  No        If Yes:  Date Range for reporting period:  Beginning  Ending    Progress report will be due (10 Rx or 30 days whichever is less):        Recertification will be due (POC Duration  / 90 days whichever is less):          Visit # Insurance Allowable Auth Required   11 New insurance waiting on card []  Yes []  No        Functional Scale: FOTO: 55/100    Date assessed:  10/19       Latex Allergy:  [x]NO      []YES  Preferred Language for Healthcare:   [x]English       []other:    Pain level:  2/10     SUBJECTIVE:  Pt reports his knee feels better today. He continues to notice pain on the lateral aspect of knee. Pt also continues to have the most discomfort with walking down the steps. He continues to feel like his knee is going to catch, but it does not.      OBJECTIVE:   10/19 ROM knee flex 140 ° , knee ext 0 °   Observation: portal sites healing well; pt amb with no crutches; ? pain (although improved) with LLE initial swing phase of gait     RESTRICTIONS/PRECAUTIONS: acitivty moderation     Exercises/Interventions:   Exercise/Equipment Resistance/Repetitions Other comments Stretching     Hamstring [id number]    Towel Pull gastroc [id number] slant   Inclined Calf     Hip Flexion     ITB     Groin     Quad S prone 30\"x5                   SLR     Supine 3x10 2# ^ 10/19   Abduction 3x10 2# ^ 10/19   Adduction     Prone     SLR+     clamshell 3x10  ^ 10/17, black TB   Isometrics     Quad sets [id number] Able to tolerate pushing down for all reps        Patellar Glides     Medial     Superior     Inferior          ROM        Hang Weights     Passive     Active     Weight Shift                       CKC     Calf raises 3x10 Added 9/19   Wall sits 10\"x10 Added 10/24   Step ups 2x10  4\" (single step), up with L, down with L/ up with R, down with R, added 10/3   1 leg stand 30\"x3 Added 10/5   Squatting     CC TKE 5\"x20 ^ 10/17 CC level 4   bridges 10\"x10 Added 9/28, changed 10/12        PRE     Extension  RANGE:   Flexion  RANGE:        Quantum machines     Leg press      Leg extension     Leg curl          Manual interventions                     Therapeutic Exercise and NMR EXR  [x] (32986) Provided verbal/tactile cueing for activities related to strengthening, flexibility, endurance, ROM for improvements in LE, proximal hip, and core control with self care, mobility, lifting, ambulation.  [] (34451) Provided verbal/tactile cueing for activities related to improving balance, coordination, kinesthetic sense, posture, motor skill, proprioception  to assist with LE, proximal hip, and core control in self care, mobility, lifting, ambulation and eccentric single leg control.      NMR and Therapeutic Activities:    [] (57103 or 40061) Provided verbal/tactile cueing for activities related to improving balance, coordination, kinesthetic sense, posture, motor skill, proprioception and motor activation to allow for proper function of core, proximal hip and LE with self care and ADLs  [] (14202) Gait Re-education- Provided training and instruction to the patient for proper LE, core and proximal hip recruitment and positioning and eccentric body weight control with ambulation re-education including up and down stairs     Home Exercise Program:    [x] (52311) Reviewed/Progressed HEP activities related to strengthening, flexibility, endurance, ROM of core, proximal hip and LE for functional self-care, mobility, lifting and ambulation/stair navigation   [] (72264)Reviewed/Progressed HEP activities related to improving balance, coordination, kinesthetic sense, posture, motor skill, proprioception of core, proximal hip and LE for self care, mobility, lifting, and ambulation/stair navigation      Manual Treatments:  PROM / STM / Oscillations-Mobs:  G-I, II, III, IV (PA's, Inf., Post.)  [x] (39161) Provided manual therapy to mobilize LE, proximal hip and/or LS spine soft tissue/joints for the purpose of modulating pain, promoting relaxation,  increasing ROM, reducing/eliminating soft tissue swelling/inflammation/restriction, improving soft tissue extensibility and allowing for proper ROM for normal function with self care, mobility, lifting and ambulation. Modalities:  vaso 10    Charges:  Timed Code Treatment Minutes: 45   Total Treatment Minutes: 55       [] EVAL (LOW) 75712 (typically 20 minutes face-to-face)  [] EVAL (MOD) 87824 (typically 30 minutes face-to-face)  [] EVAL (HIGH) 99526 (typically 45 minutes face-to-face)  [] RE-EVAL   [x] VG(77214) x  3   [] IONTO  [] NMR (90160) x     [x] VASO  [] Manual (59914) x      [] Other:  [] TA x      [] Mech Traction (79966)  [] ES(attended) (54982)      [] ES (un) (45385):     GOALS:   Patient stated goal: return to community activities     [] Progressing: [] Met: [] Not Met: [] Adjusted     Therapist goals for Patient:   Short Term Goals: To be achieved in: 2-4 weeks  1. Independent in HEP and progression per patient tolerance, in order to prevent re-injury. [] Progressing: [] Met: [] Not Met: [] Adjusted   2.  Patient will have a decrease in pain to facilitate improvement in movement, function, and ADLs as indicated by Functional Deficits. [] Progressing: [] Met: [] Not Met: [] Adjusted     Long Term Goals: To be achieved in: 12 weeks  1. Foto68/100to assist with reaching prior level of function. [] Progressing: [] Met: [] Not Met: [] Adjusted  2. Patient will demonstrate increased AROM to =R to allow for proper joint functioning as indicated by patients Functional Deficits. [] Progressing: [] Met: [] Not Met: [] Adjusted  3. Patient will demonstrate an increase in Strength to good proximal hip strength and control  in LE to allow for proper functional mobility as indicated by patients Functional Deficits. [] Progressing: [] Met: [] Not Met: [] Adjusted  4. Patient will return to normal heel to toe gait in community  ascend descend 12 steps alternating  functional activities without increased symptoms or restriction. [] Progressing: [] Met: [] Not Met: [] Adjusted  Overall Progression Towards Functional goals/ Treatment Progress Update:  [x] Patient is progressing as expected towards functional goals listed. [] Progression is slowed due to complexities/Impairments listed. [] Progression has been slowed due to co-morbidities. [] Plan just implemented, too soon to assess goals progression <30days   [] Goals require adjustment due to lack of progress  [] Patient is not progressing as expected and requires additional follow up with physician  [] Other    Prognosis for POC: [x] Good [] Fair  [] Poor      Patient requires continued skilled intervention: [x] Yes  [] No    Treatment/Activity Tolerance:  [x] Patient able to complete treatment  [] Patient limited by fatigue  [] Patient limited by pain     [] Patient limited by other medical complications  [x] Other: Pt exhibits good gait pattern with no crutches and will benefit from slow progression to WB exercises as strength and pain allows. Patient Education:     Reviewed diagnosis, POC, HEP and its importance. Access Code: [email or web address]  URL: [employer]/  Date: 09/16/2022  Prepared by: Lianne Husbands     Exercises  Long Sitting Ankle Pumps - 8 x daily - 7 x weekly - 3 sets - 10 reps - 1 hold  Long Sitting Calf Stretch with Strap - 2-3 x daily - 7 x weekly - 5 reps - 30 hold  Seated Table Hamstring Stretch - 2-3 x daily - 7 x weekly - 5 reps - 30 hold  Long Sitting Quad Set - 2-3 x daily - 7 x weekly - 10 reps - 10 hold  Active Straight Leg Raise with Quad Set - 2-3 x daily - 7 x weekly - 3 sets - 10 reps  Sidelying Hip Abduction - 2-3 x daily - 7 x weekly - 3 sets - 10 reps  Sitting Heel Slide with Towel - 2-3 x daily - 7 x weekly - 10 reps - 10 hold     PLAN: See eval  [x] Continue per plan of care [] Alter current plan (see comments above)  [] Plan of care initiated [] Hold pending MD visit [] Discharge      Electronically signed by:  Harmony Irwin PT    Note: If patient does not return for scheduled/ recommended follow up visits, this note will serve as a discharge from care along with most recent update on progress.

## 2022-10-26 ENCOUNTER — HOSPITAL ENCOUNTER (OUTPATIENT)
Dept: PHYSICAL THERAPY | Age: 52
Setting detail: THERAPIES SERIES
Discharge: HOME | End: 2022-10-26
Payer: COMMERCIAL

## 2022-10-26 PROCEDURE — 97110 THERAPEUTIC EXERCISES: CPT

## 2022-10-26 PROCEDURE — 97112 NEUROMUSCULAR REEDUCATION: CPT

## 2022-10-26 PROCEDURE — 97016 VASOPNEUMATIC DEVICE THERAPY: CPT

## 2022-10-26 NOTE — FLOWSHEET NOTE
[address],  [address]  Phone: [phone]   Fax:     [phone]   Physical Therapy Re-Certification Plan of Care      Physical Therapy Daily Treatment Note  Date:  10/26/2022    Patient Name:  Alec Perez    :  1970  MRN: [id number]  Restrictions/Precautions:    Medical/Treatment Diagnosis Information:  Diagnosis: Primary osteoarthritis of left knee (M17.12)  Treatment Diagnosis: L knee pain D72.597  Insurance/Certification information:  PT Insurance Information: [employer]  Physician Information:   Serina Ayala  Has the plan of care been signed (Y/N):        []  Yes  [x]  No     Date of Patient follow up with Physician: per md, mid Oct (not scheduled yet)      Is this a Progress Report:     []  Yes  [x]  No        If Yes:  Date Range for reporting period:  Beginning  Ending    Progress report will be due (10 Rx or 30 days whichever is less):        Recertification will be due (POC Duration  / 90 days whichever is less):          Visit # Insurance Allowable Auth Required   11 New insurance waiting on card []  Yes []  No        Functional Scale: FOTO: 55/100    Date assessed:  10/19       Latex Allergy:  [x]NO      []YES  Preferred Language for Healthcare:   [x]English       []other:    Pain level:  2/10     SUBJECTIVE:  Pt has noticed he feels ? pain after anytime his knee is in a bent or straight position for a prolonged period and then he goes to move it. Pt notes it feels tight on either side of knee cap. He continues to feel like his knee is going to catch however it does not catch.      OBJECTIVE:   10/19 ROM knee flex 140 ° , knee ext 0 °   Observation: portal sites healing well; pt amb with no crutches; ? pain (although improved) with LLE initial swing phase of gait     RESTRICTIONS/PRECAUTIONS: acitivty moderation     Exercises/Interventions:   Exercise/Equipment Resistance/Repetitions Other comments   Stretching     Hamstring [id number]    Towel Pull gastroc [id number] slant   Inclined Calf     Hip Flexion     ITB     Groin     Quad S prone 30\"x5                   SLR     Supine 3x10 2# ^ 10/19   Abduction 3x10 2# ^ 10/19   Adduction     Prone     SLR+     clamshell 3x10  ^ 10/17, black TB   Isometrics     Quad sets [id number] Able to tolerate pushing down for all reps        Patellar Glides     Medial     Superior     Inferior          ROM        Hang Weights     Passive     Active     Weight Shift                       CKC     Calf raises 3x10 Added 9/19   Wall sits 10\"x10    1 leg stand 20\"x3 on foam ^10/26   Squatting     CC TKE 5\"x20 ^ 10/17 CC level 4   bridges 10\"x10 Added 9/28, changed 10/12        PRE     Extension  RANGE:   Flexion  RANGE:        Quantum machines     Leg press      Leg extension     Leg curl          Manual interventions                     Therapeutic Exercise and NMR EXR  [x] (29911) Provided verbal/tactile cueing for activities related to strengthening, flexibility, endurance, ROM for improvements in LE, proximal hip, and core control with self care, mobility, lifting, ambulation.  [] (13131) Provided verbal/tactile cueing for activities related to improving balance, coordination, kinesthetic sense, posture, motor skill, proprioception  to assist with LE, proximal hip, and core control in self care, mobility, lifting, ambulation and eccentric single leg control.      NMR and Therapeutic Activities:    [] (01231 or 74985) Provided verbal/tactile cueing for activities related to improving balance, coordination, kinesthetic sense, posture, motor skill, proprioception and motor activation to allow for proper function of core, proximal hip and LE with self care and ADLs  [] (93176) Gait Re-education- Provided training and instruction to the patient for proper LE, core and proximal hip recruitment and positioning and eccentric body weight control with ambulation re-education including up and down stairs     Home Exercise Program:    [x] (11122) Reviewed/Progressed HEP activities related to strengthening, flexibility, endurance, ROM of core, proximal hip and LE for functional self-care, mobility, lifting and ambulation/stair navigation   [] (52602)Reviewed/Progressed HEP activities related to improving balance, coordination, kinesthetic sense, posture, motor skill, proprioception of core, proximal hip and LE for self care, mobility, lifting, and ambulation/stair navigation      Manual Treatments:  PROM / STM / Oscillations-Mobs:  G-I, II, III, IV (PA's, Inf., Post.)  [x] (98503) Provided manual therapy to mobilize LE, proximal hip and/or LS spine soft tissue/joints for the purpose of modulating pain, promoting relaxation,  increasing ROM, reducing/eliminating soft tissue swelling/inflammation/restriction, improving soft tissue extensibility and allowing for proper ROM for normal function with self care, mobility, lifting and ambulation. Modalities:  vaso 10    Charges:  Timed Code Treatment Minutes: 45   Total Treatment Minutes: 55       [] EVAL (LOW) 21346 (typically 20 minutes face-to-face)  [] EVAL (MOD) 35894 (typically 30 minutes face-to-face)  [] EVAL (HIGH) 23862 (typically 45 minutes face-to-face)  [] RE-EVAL   [x] HB(69845) x  3   [] IONTO  [] NMR (52394) x     [x] VASO  [] Manual (26799) x      [] Other:  [] TA x      [] Mech Traction (12172)  [] ES(attended) (48331)      [] ES (un) (54058):     GOALS:   Patient stated goal: return to community activities     [] Progressing: [] Met: [] Not Met: [] Adjusted     Therapist goals for Patient:   Short Term Goals: To be achieved in: 2-4 weeks  1. Independent in HEP and progression per patient tolerance, in order to prevent re-injury. [] Progressing: [] Met: [] Not Met: [] Adjusted   2.  Patient will have a decrease in pain to facilitate improvement in movement, function, and ADLs as indicated by Functional Deficits. [] Progressing: [] Met: [] Not Met: [] Adjusted     Long Term Goals: To be achieved in: 12 weeks  1. Foto68/100to assist with reaching prior level of function. [] Progressing: [] Met: [] Not Met: [] Adjusted  2. Patient will demonstrate increased AROM to =R to allow for proper joint functioning as indicated by patients Functional Deficits. [] Progressing: [] Met: [] Not Met: [] Adjusted  3. Patient will demonstrate an increase in Strength to good proximal hip strength and control  in LE to allow for proper functional mobility as indicated by patients Functional Deficits. [] Progressing: [] Met: [] Not Met: [] Adjusted  4. Patient will return to normal heel to toe gait in community  ascend descend 12 steps alternating  functional activities without increased symptoms or restriction. [] Progressing: [] Met: [] Not Met: [] Adjusted  Overall Progression Towards Functional goals/ Treatment Progress Update:  [x] Patient is progressing as expected towards functional goals listed. [] Progression is slowed due to complexities/Impairments listed. [] Progression has been slowed due to co-morbidities. [] Plan just implemented, too soon to assess goals progression <30days   [] Goals require adjustment due to lack of progress  [] Patient is not progressing as expected and requires additional follow up with physician  [] Other    Prognosis for POC: [x] Good [] Fair  [] Poor      Patient requires continued skilled intervention: [x] Yes  [] No    Treatment/Activity Tolerance:  [x] Patient able to complete treatment  [] Patient limited by fatigue  [] Patient limited by pain     [] Patient limited by other medical complications  [x] Other: Pt exhibits good gait pattern with no crutches and will benefit from slow progression to WB exercises as strength and pain allows. Patient Education:  ITB rolling with foam roller or rolling pin    Reviewed diagnosis, POC, HEP and its importance. Access Code: [email or web address]  URL: [email or web address]/  Date: 09/16/2022  Prepared by: Gabriel Osorio     Exercises  Long Sitting Ankle Pumps - 8 x daily - 7 x weekly - 3 sets - 10 reps - 1 hold  Long Sitting Calf Stretch with Strap - 2-3 x daily - 7 x weekly - 5 reps - 30 hold  Seated Table Hamstring Stretch - 2-3 x daily - 7 x weekly - 5 reps - 30 hold  Long Sitting Quad Set - 2-3 x daily - 7 x weekly - 10 reps - 10 hold  Active Straight Leg Raise with Quad Set - 2-3 x daily - 7 x weekly - 3 sets - 10 reps  Sidelying Hip Abduction - 2-3 x daily - 7 x weekly - 3 sets - 10 reps  Sitting Heel Slide with Towel - 2-3 x daily - 7 x weekly - 10 reps - 10 hold     PLAN: See eval  [x] Continue per plan of care [] Alter current plan (see comments above)  [] Plan of care initiated [] Hold pending MD visit [] Discharge      Electronically signed by:  Franklin Michelle PT    Note: If patient does not return for scheduled/ recommended follow up visits, this note will serve as a discharge from care along with most recent update on progress.

## 2022-10-31 ENCOUNTER — HOSPITAL ENCOUNTER (OUTPATIENT)
Dept: PHYSICAL THERAPY | Age: 52
Setting detail: THERAPIES SERIES
Discharge: HOME | End: 2022-10-31
Payer: COMMERCIAL

## 2022-10-31 PROCEDURE — 97110 THERAPEUTIC EXERCISES: CPT

## 2022-10-31 PROCEDURE — 97112 NEUROMUSCULAR REEDUCATION: CPT

## 2022-10-31 PROCEDURE — 97016 VASOPNEUMATIC DEVICE THERAPY: CPT

## 2022-10-31 NOTE — FLOWSHEET NOTE
[facility] [address] [place] [address], [address] [place] [address]  Phone: [phone]   Fax:     [phone]   Physical Therapy Re-Certification Plan of Care      Physical Therapy Daily Treatment Note  Date:  10/31/2022    Patient Name:  Bisi Garrison    :  1970  MRN: [id number]  Restrictions/Precautions:    Medical/Treatment Diagnosis Information:  Diagnosis: Primary osteoarthritis of left knee (M17.12)  Treatment Diagnosis: L knee pain N48.712  Insurance/Certification information:  PT Insurance Information: [facility]  Physician Information:   Leticia Saldana  Has the plan of care been signed (Y/N):        []  Yes  [x]  No     Date of Patient follow up with Physician: per md, mid Oct (not scheduled yet)      Is this a Progress Report:     []  Yes  [x]  No        If Yes:  Date Range for reporting period:  Beginning  Ending    Progress report will be due (10 Rx or 30 days whichever is less): 64       Recertification will be due (POC Duration  / 90 days whichever is less):          Visit # Insurance Allowable Auth Required   12 New insurance waiting on card []  Yes []  No        Functional Scale: FOTO: 55/100    Date assessed:  10/19       Latex Allergy:  [x]NO      []YES  Preferred Language for Healthcare:   [x]English       []other:    Pain level:  2/10     SUBJECTIVE:  Pt has felt some popping and does think the he is feeling a catch, notably after having knee bent for a prolonged period of time. Pt feels an achy sensation after the pop. Pt continues to have ? pain overall though.      OBJECTIVE:   10/19 ROM knee flex 140 ° , knee ext 0 °   Observation: portal sites healing well; pt amb with no crutches; ? pain (although improved) with LLE initial swing phase of gait     RESTRICTIONS/PRECAUTIONS: acitivty moderation     Exercises/Interventions:   Exercise/Equipment Resistance/Repetitions Other comments   Stretching     Hamstring [id number]    Towel Pull gastroc [id number] slant   Inclined Calf     Hip Flexion     ITB     Groin     Quad S prone 30\"x5                   SLR     Supine 3x10 2# ^ 10/31   Abduction 3x10 2# ^ 10/31   Adduction     Prone     SLR+     clamshell 3x10  ^ 10/17, black TB   Isometrics     Quad sets [id number] Able to tolerate pushing down for all reps        Patellar Glides     Medial     Superior     Inferior          ROM        Hang Weights     Passive     Active     Weight Shift                       CKC     Calf raises 3x10 Added 9/19   Wall sits 10\"x10    1 leg stand 20\"x3 on foam ^10/26   Squatting     CC TKE 5\"x20 ^ 10/17 CC level 4   bridges 10\"x10 Added 9/28, changed 10/12        PRE     Extension  RANGE:   Flexion  RANGE:        Quantum machines     Leg press      Leg extension     Leg curl          Manual interventions                     Therapeutic Exercise and NMR EXR  [x] (23785) Provided verbal/tactile cueing for activities related to strengthening, flexibility, endurance, ROM for improvements in LE, proximal hip, and core control with self care, mobility, lifting, ambulation.  [] (52738) Provided verbal/tactile cueing for activities related to improving balance, coordination, kinesthetic sense, posture, motor skill, proprioception  to assist with LE, proximal hip, and core control in self care, mobility, lifting, ambulation and eccentric single leg control.      NMR and Therapeutic Activities:    [] (24763 or 12521) Provided verbal/tactile cueing for activities related to improving balance, coordination, kinesthetic sense, posture, motor skill, proprioception and motor activation to allow for proper function of core, proximal hip and LE with self care and ADLs  [] (46490) Gait Re-education- Provided training and instruction to the patient for proper LE, core and proximal hip recruitment and positioning and eccentric body weight control with ambulation re-education including up and down stairs     Home Exercise Program:    [x] (02426) Reviewed/Progressed HEP activities related to strengthening, flexibility, endurance, ROM of core, proximal hip and LE for functional self-care, mobility, lifting and ambulation/stair navigation   [] (89648)Reviewed/Progressed HEP activities related to improving balance, coordination, kinesthetic sense, posture, motor skill, proprioception of core, proximal hip and LE for self care, mobility, lifting, and ambulation/stair navigation      Manual Treatments:  PROM / STM / Oscillations-Mobs:  G-I, II, III, IV (PA's, Inf., Post.)  [x] (70140) Provided manual therapy to mobilize LE, proximal hip and/or LS spine soft tissue/joints for the purpose of modulating pain, promoting relaxation,  increasing ROM, reducing/eliminating soft tissue swelling/inflammation/restriction, improving soft tissue extensibility and allowing for proper ROM for normal function with self care, mobility, lifting and ambulation. Modalities:  vaso 10    Charges:  Timed Code Treatment Minutes: 45   Total Treatment Minutes: 55       [] EVAL (LOW) 14787 (typically 20 minutes face-to-face)  [] EVAL (MOD) 54589 (typically 30 minutes face-to-face)  [] EVAL (HIGH) 16591 (typically 45 minutes face-to-face)  [] RE-EVAL   [x] UN(89132) x  3   [] IONTO  [] NMR (71317) x     [x] VASO  [] Manual (38889) x      [] Other:  [] TA x      [] Mech Traction (92409)  [] ES(attended) (26479)      [] ES (un) (57201):     GOALS:   Patient stated goal: return to community activities     [] Progressing: [] Met: [] Not Met: [] Adjusted     Therapist goals for Patient:   Short Term Goals: To be achieved in: 2-4 weeks  1. Independent in HEP and progression per patient tolerance, in order to prevent re-injury. [] Progressing: [] Met: [] Not Met: [] Adjusted   2. Patient will have a decrease in pain to facilitate improvement in movement, function, and ADLs as indicated by Functional Deficits.     [] Progressing: [] Met: [] Not Met: [] Adjusted Long Term Goals: To be achieved in: 12 weeks  1. Foto68/100to assist with reaching prior level of function. [] Progressing: [] Met: [] Not Met: [] Adjusted  2. Patient will demonstrate increased AROM to =R to allow for proper joint functioning as indicated by patients Functional Deficits. [] Progressing: [] Met: [] Not Met: [] Adjusted  3. Patient will demonstrate an increase in Strength to good proximal hip strength and control  in LE to allow for proper functional mobility as indicated by patients Functional Deficits. [] Progressing: [] Met: [] Not Met: [] Adjusted  4. Patient will return to normal heel to toe gait in community  ascend descend 12 steps alternating  functional activities without increased symptoms or restriction. [] Progressing: [] Met: [] Not Met: [] Adjusted  Overall Progression Towards Functional goals/ Treatment Progress Update:  [x] Patient is progressing as expected towards functional goals listed. [] Progression is slowed due to complexities/Impairments listed. [] Progression has been slowed due to co-morbidities. [] Plan just implemented, too soon to assess goals progression <30days   [] Goals require adjustment due to lack of progress  [] Patient is not progressing as expected and requires additional follow up with physician  [] Other    Prognosis for POC: [x] Good [] Fair  [] Poor      Patient requires continued skilled intervention: [x] Yes  [] No    Treatment/Activity Tolerance:  [x] Patient able to complete treatment  [] Patient limited by fatigue  [] Patient limited by pain     [] Patient limited by other medical complications  [x] Other: Pt exhibits good gait pattern with no crutches and will benefit from slow progression to WB exercises as strength and pain allows. Patient Education:  ITB rolling with foam roller or rolling pin    Reviewed diagnosis, POC, HEP and its importance. Access Code: [email or web address]  URL: [email or web address]  Date: 09/16/2022  Prepared by: Osmany Mota     Exercises  Long Sitting Ankle Pumps - 8 x daily - 7 x weekly - 3 sets - 10 reps - 1 hold  Long Sitting Calf Stretch with Strap - 2-3 x daily - 7 x weekly - 5 reps - 30 hold  Seated Table Hamstring Stretch - 2-3 x daily - 7 x weekly - 5 reps - 30 hold  Long Sitting Quad Set - 2-3 x daily - 7 x weekly - 10 reps - 10 hold  Active Straight Leg Raise with Quad Set - 2-3 x daily - 7 x weekly - 3 sets - 10 reps  Sidelying Hip Abduction - 2-3 x daily - 7 x weekly - 3 sets - 10 reps  Sitting Heel Slide with Towel - 2-3 x daily - 7 x weekly - 10 reps - 10 hold     PLAN: See eval  [x] Continue per plan of care [] Alter current plan (see comments above)  [] Plan of care initiated [] Hold pending MD visit [] Discharge      Electronically signed by:  Nica Jean PT    Note: If patient does not return for scheduled/ recommended follow up visits, this note will serve as a discharge from care along with most recent update on progress.

## 2022-11-02 ENCOUNTER — HOSPITAL ENCOUNTER (OUTPATIENT)
Dept: PHYSICAL THERAPY | Age: 52
Setting detail: THERAPIES SERIES
Discharge: HOME | End: 2022-11-02
Payer: COMMERCIAL

## 2022-11-02 PROCEDURE — 97112 NEUROMUSCULAR REEDUCATION: CPT

## 2022-11-02 PROCEDURE — 97016 VASOPNEUMATIC DEVICE THERAPY: CPT

## 2022-11-02 PROCEDURE — 97110 THERAPEUTIC EXERCISES: CPT

## 2022-11-02 NOTE — FLOWSHEET NOTE
The [address] [place], [place] [address], [address], [address] [place] [address]  Phone: [phone]   Fax:     [phone]   Physical Therapy Re-Certification Plan of Care      Physical Therapy Daily Treatment Note  Date:  2022    Patient Name:  Yasir Carpenter    :  1970  MRN: [id number]  Restrictions/Precautions:    Medical/Treatment Diagnosis Information:  Diagnosis: Primary osteoarthritis of left knee (M17.12)  Treatment Diagnosis: L knee pain G01.886  Insurance/Certification information:  PT Insurance Information: [facility]  Physician Information:   Colleen Lafleur  Has the plan of care been signed (Y/N):        []  Yes  [x]  No     Date of Patient follow up with Physician: per md, mid Oct (not scheduled yet)      Is this a Progress Report:     []  Yes  [x]  No        If Yes:  Date Range for reporting period:  Beginning  Ending    Progress report will be due (10 Rx or 30 days whichever is less):        Recertification will be due (POC Duration  / 90 days whichever is less):          Visit # Insurance Allowable Auth Required   12 New insurance waiting on card []  Yes []  No        Functional Scale: FOTO: 55/100    Date assessed:  10/19       Latex Allergy:  [x]NO      []YES  Preferred Language for Healthcare:   [x]English       []other:    Pain level:  2/10     SUBJECTIVE:  Pt reports if he uses   OBJECTIVE:   10/19 ROM knee flex 140 ° , knee ext 0 °   Observation: portal sites healing well; pt amb with no crutches; ? pain (although improved) with LLE initial swing phase of gait   : Taping at end of treatment for lateral tracking patella    RESTRICTIONS/PRECAUTIONS: acitivty moderation     Exercises/Interventions:   Exercise/Equipment Resistance/Repetitions Other comments   Stretching     Hamstring [id number]    Towel Pull gastroc [id number] slant   Inclined Calf     Hip Flexion     ITB     Groin     Quad S prone 30\"x5                   SLR Supine 3x10 2# ^ 10/19   Abduction 3x10 2# ^ 10/19   Adduction     Prone     SLR+     clamshell 3x10  ^ 10/17, black TB   Isometrics     Quad sets [id number] With NMES 11/2        Patellar Glides     Medial     Superior     Inferior          ROM        Hang Weights     Passive     Active     Weight Shift                       CKC     Calf raises 3x10 Added 9/19   Wall sits + add iso 10\"x10 Changed 10/31   1 leg stand 30\"x2 on foam, alt R/L ^10/26   Squatting     CC TKE 5\"x15 ^ 11/2 CC level 6   Bridges + ADD iso 10\"x10  Added 9/28, changed 10/31        PRE     Extension  RANGE:   Flexion  RANGE:        Quantum machines     Leg press      Leg extension     Leg curl          Manual interventions                     Therapeutic Exercise and NMR EXR  [x] (71915) Provided verbal/tactile cueing for activities related to strengthening, flexibility, endurance, ROM for improvements in LE, proximal hip, and core control with self care, mobility, lifting, ambulation.  [] (53035) Provided verbal/tactile cueing for activities related to improving balance, coordination, kinesthetic sense, posture, motor skill, proprioception  to assist with LE, proximal hip, and core control in self care, mobility, lifting, ambulation and eccentric single leg control.      NMR and Therapeutic Activities:    [] (11102 or 87004) Provided verbal/tactile cueing for activities related to improving balance, coordination, kinesthetic sense, posture, motor skill, proprioception and motor activation to allow for proper function of core, proximal hip and LE with self care and ADLs  [] (12903) Gait Re-education- Provided training and instruction to the patient for proper LE, core and proximal hip recruitment and positioning and eccentric body weight control with ambulation re-education including up and down stairs     Home Exercise Program:    [x] (67532) Reviewed/Progressed HEP activities related to strengthening, flexibility, endurance, ROM of core, proximal hip and LE for functional self-care, mobility, lifting and ambulation/stair navigation   [] (22445)Reviewed/Progressed HEP activities related to improving balance, coordination, kinesthetic sense, posture, motor skill, proprioception of core, proximal hip and LE for self care, mobility, lifting, and ambulation/stair navigation      Manual Treatments:  PROM / STM / Oscillations-Mobs:  G-I, II, III, IV (PA's, Inf., Post.)  [x] (68625) Provided manual therapy to mobilize LE, proximal hip and/or LS spine soft tissue/joints for the purpose of modulating pain, promoting relaxation,  increasing ROM, reducing/eliminating soft tissue swelling/inflammation/restriction, improving soft tissue extensibility and allowing for proper ROM for normal function with self care, mobility, lifting and ambulation. Modalities:  vaso 10    Charges:  Timed Code Treatment Minutes: 45   Total Treatment Minutes: 55       [] EVAL (LOW) 06012 (typically 20 minutes face-to-face)  [] EVAL (MOD) 11222 (typically 30 minutes face-to-face)  [] EVAL (HIGH) 65981 (typically 45 minutes face-to-face)  [] RE-EVAL   [x] DN(62852) x  2   [] IONTO  [x] NMR (27065) x  1   [x] VASO  [] Manual (58682) x      [] Other:  [] TA x      [] Mech Traction (50731)  [] ES(attended) (93932)      [] ES (un) (42372):     GOALS:   Patient stated goal: return to community activities     [] Progressing: [] Met: [] Not Met: [] Adjusted     Therapist goals for Patient:   Short Term Goals: To be achieved in: 2-4 weeks  1. Independent in HEP and progression per patient tolerance, in order to prevent re-injury. [] Progressing: [] Met: [] Not Met: [] Adjusted   2. Patient will have a decrease in pain to facilitate improvement in movement, function, and ADLs as indicated by Functional Deficits. [] Progressing: [] Met: [] Not Met: [] Adjusted     Long Term Goals: To be achieved in: 12 weeks  1. Foto68/100to assist with reaching prior level of function.    [] Progressing: [] Met: [] Not Met: [] Adjusted  2. Patient will demonstrate increased AROM to =R to allow for proper joint functioning as indicated by patients Functional Deficits. [] Progressing: [] Met: [] Not Met: [] Adjusted  3. Patient will demonstrate an increase in Strength to good proximal hip strength and control  in LE to allow for proper functional mobility as indicated by patients Functional Deficits. [] Progressing: [] Met: [] Not Met: [] Adjusted  4. Patient will return to normal heel to toe gait in community  ascend descend 12 steps alternating  functional activities without increased symptoms or restriction. [] Progressing: [] Met: [] Not Met: [] Adjusted  Overall Progression Towards Functional goals/ Treatment Progress Update:  [x] Patient is progressing as expected towards functional goals listed. [] Progression is slowed due to complexities/Impairments listed. [] Progression has been slowed due to co-morbidities. [] Plan just implemented, too soon to assess goals progression <30days   [] Goals require adjustment due to lack of progress  [] Patient is not progressing as expected and requires additional follow up with physician  [] Other    Prognosis for POC: [x] Good [] Fair  [] Poor      Patient requires continued skilled intervention: [x] Yes  [] No    Treatment/Activity Tolerance:  [x] Patient able to complete treatment  [] Patient limited by fatigue  [] Patient limited by pain     [] Patient limited by other medical complications  [x] Other: Pt exhibits impaired firing of quad, specifically the VMO, contributing to lateral tracking patella and continued catching feeling and residual pain and soreness post op. PT and pt are focused on firing pattern of quad with NMES, taping, and specific exercise    Patient Education:  ITB rolling with foam roller or rolling pin    Reviewed diagnosis, POC, HEP and its importance.                 Access Code: [email or web address]  URL: [email or web address]  Date: 09/16/2022  Prepared by: Francesco Castellanos     Exercises  Long Sitting Ankle Pumps - 8 x daily - 7 x weekly - 3 sets - 10 reps - 1 hold  Long Sitting Calf Stretch with Strap - 2-3 x daily - 7 x weekly - 5 reps - 30 hold  Seated Table Hamstring Stretch - 2-3 x daily - 7 x weekly - 5 reps - 30 hold  Long Sitting Quad Set - 2-3 x daily - 7 x weekly - 10 reps - 10 hold  Active Straight Leg Raise with Quad Set - 2-3 x daily - 7 x weekly - 3 sets - 10 reps  Sidelying Hip Abduction - 2-3 x daily - 7 x weekly - 3 sets - 10 reps  Sitting Heel Slide with Towel - 2-3 x daily - 7 x weekly - 10 reps - 10 hold     PLAN: See eval  [x] Continue per plan of care [] Alter current plan (see comments above)  [] Plan of care initiated [] Hold pending MD visit [] Discharge      Electronically signed by:  Dennys Boothe PT    Note: If patient does not return for scheduled/ recommended follow up visits, this note will serve as a discharge from care along with most recent update on progress.

## 2022-11-07 ENCOUNTER — HOSPITAL ENCOUNTER (OUTPATIENT)
Dept: PHYSICAL THERAPY | Age: 52
Setting detail: THERAPIES SERIES
Discharge: HOME | End: 2022-11-07
Payer: COMMERCIAL

## 2022-11-07 PROCEDURE — 97112 NEUROMUSCULAR REEDUCATION: CPT

## 2022-11-07 PROCEDURE — 97016 VASOPNEUMATIC DEVICE THERAPY: CPT

## 2022-11-07 PROCEDURE — 97110 THERAPEUTIC EXERCISES: CPT

## 2022-11-07 NOTE — FLOWSHEET NOTE
[facility] [address]  Phone: [phone]   Fax:     [phone]   Physical Therapy Re-Certification Plan of Care      Physical Therapy Daily Treatment Note  Date:  2022    Patient Name:  Shane Urban   \"JT\" :  1970  MRN: [id number]  Restrictions/Precautions:    Medical/Treatment Diagnosis Information:  Diagnosis: Primary osteoarthritis of left knee (M17.12)  Treatment Diagnosis: L knee pain J78.616  Insurance/Certification information:  PT Insurance Information: [facility]  Physician Information:   Melissa Santo  Has the plan of care been signed (Y/N):        []  Yes  [x]  No     Date of Patient follow up with Physician: per md, mid Oct (not scheduled yet)      Is this a Progress Report:     []  Yes  [x]  No        If Yes:  Date Range for reporting period:  Beginning  Ending    Progress report will be due (10 Rx or 30 days whichever is less):        Recertification will be due (POC Duration  / 90 days whichever is less):          Visit # Insurance Allowable Auth Required   13 New insurance waiting on card []  Yes []  No        Functional Scale: FOTO: 55/100    Date assessed:  10/19       Latex Allergy:  [x]NO      []YES  Preferred Language for Healthcare:   [x]English       []other:    Pain level:  2/10     SUBJECTIVE:  Pt reports he continues to feel mostly no pain however pain remains with certain activities: after prolonged positioning, steps, and prolonged walking. Pt has been compliant with HEP with the addition of NMES at home, daily. He can set it for a 5sec on/5 sec off for x10 min.  Pt also notes he feels like he has been re-learning to walk and has felt a significant    OBJECTIVE:   10/19 ROM knee flex 140 ° , knee ext 0 °   Observation: portal sites healing well; pt amb with no crutches; ? pain (although improved) with LLE initial swing phase of gait RESTRICTIONS/PRECAUTIONS: acitivty moderation     Exercises/Interventions:   Exercise/Equipment Resistance/Repetitions Other comments   Stretching     Hamstring [id number]    Towel Pull gastroc [id number] slant   Inclined Calf     Hip Flexion     ITB     Groin                       SLR     Supine 3x10 2# ^ 10/19   Abduction 3x10 2# ^ 10/19   Adduction     Prone     SLR+     clamshell 3x10  ^ 10/17, black TB   Isometrics     Quad sets with NMES (symmtric biphasic) 10\" hold x8 min TAPE for lateral tracking patella 1st, then NMES        Patellar Glides     Medial     Superior     Inferior          ROM        Hang Weights     Passive     Active     Weight Shift                       CKC     Calf raises 3x10 Added 9/19   Wall sits + add iso 10\"x10 Changed 10/31   1 leg stand 30\"x3 on foam, alt R/L ^10/26   Squatting     CC TKE 5\"x30 ^ 11/2 CC level 6   Bridges + ADD iso 10\"x10  Added 9/28, changed 10/31        PRE     Extension  RANGE:   Flexion  RANGE:        Quantum machines     Leg press      Leg extension     Leg curl          Manual interventions                     Therapeutic Exercise and NMR EXR  [x] (80780) Provided verbal/tactile cueing for activities related to strengthening, flexibility, endurance, ROM for improvements in LE, proximal hip, and core control with self care, mobility, lifting, ambulation.  [] (33636) Provided verbal/tactile cueing for activities related to improving balance, coordination, kinesthetic sense, posture, motor skill, proprioception  to assist with LE, proximal hip, and core control in self care, mobility, lifting, ambulation and eccentric single leg control.      NMR and Therapeutic Activities:    [] (62806 or 42785) Provided verbal/tactile cueing for activities related to improving balance, coordination, kinesthetic sense, posture, motor skill, proprioception and motor activation to allow for proper function of core, proximal hip and LE with self care and ADLs  [] (78064) Gait Progressing: [] Met: [] Not Met: [] Adjusted   2. Patient will have a decrease in pain to facilitate improvement in movement, function, and ADLs as indicated by Functional Deficits. [] Progressing: [] Met: [] Not Met: [] Adjusted     Long Term Goals: To be achieved in: 12 weeks  1. Foto68/100to assist with reaching prior level of function. [] Progressing: [] Met: [] Not Met: [] Adjusted  2. Patient will demonstrate increased AROM to =R to allow for proper joint functioning as indicated by patients Functional Deficits. [] Progressing: [] Met: [] Not Met: [] Adjusted  3. Patient will demonstrate an increase in Strength to good proximal hip strength and control  in LE to allow for proper functional mobility as indicated by patients Functional Deficits. [] Progressing: [] Met: [] Not Met: [] Adjusted  4. Patient will return to normal heel to toe gait in community  ascend descend 12 steps alternating  functional activities without increased symptoms or restriction. [] Progressing: [] Met: [] Not Met: [] Adjusted  Overall Progression Towards Functional goals/ Treatment Progress Update:  [x] Patient is progressing as expected towards functional goals listed. [] Progression is slowed due to complexities/Impairments listed. [] Progression has been slowed due to co-morbidities.   [] Plan just implemented, too soon to assess goals progression <30days   [] Goals require adjustment due to lack of progress  [] Patient is not progressing as expected and requires additional follow up with physician  [] Other    Prognosis for POC: [x] Good [] Fair  [] Poor      Patient requires continued skilled intervention: [x] Yes  [] No    Treatment/Activity Tolerance:  [x] Patient able to complete treatment  [] Patient limited by fatigue  [] Patient limited by pain     [] Patient limited by other medical complications  [x] Other: Pt exhibits impaired firing of quad, specifically the VMO, contributing to lateral tracking patella and continued catching feeling and residual pain and soreness post op. PT and pt are focused on firing pattern of quad with NMES, taping, and specific exercise. Patient Education:  ITB rolling with foam roller or rolling pin    Reviewed diagnosis, POC, HEP and its importance. Access Code: [email or web address]  URL: [email or web address]  Date: 09/16/2022  Prepared by: Marta Mosquera     Exercises  Long Sitting Ankle Pumps - 8 x daily - 7 x weekly - 3 sets - 10 reps - 1 hold  Long Sitting Calf Stretch with Strap - 2-3 x daily - 7 x weekly - 5 reps - 30 hold  Seated Table Hamstring Stretch - 2-3 x daily - 7 x weekly - 5 reps - 30 hold  Long Sitting Quad Set - 2-3 x daily - 7 x weekly - 10 reps - 10 hold  Active Straight Leg Raise with Quad Set - 2-3 x daily - 7 x weekly - 3 sets - 10 reps  Sidelying Hip Abduction - 2-3 x daily - 7 x weekly - 3 sets - 10 reps  Sitting Heel Slide with Towel - 2-3 x daily - 7 x weekly - 10 reps - 10 hold     PLAN: See eval  [x] Continue per plan of care [] Alter current plan (see comments above)  [] Plan of care initiated [] Hold pending MD visit [] Discharge      Electronically signed by:  Jaye Bustillos PT    Note: If patient does not return for scheduled/ recommended follow up visits, this note will serve as a discharge from care along with most recent update on progress.

## 2022-11-09 ENCOUNTER — HOSPITAL ENCOUNTER (OUTPATIENT)
Dept: PHYSICAL THERAPY | Age: 52
Setting detail: THERAPIES SERIES
Discharge: HOME | End: 2022-11-09
Payer: COMMERCIAL

## 2022-11-09 PROCEDURE — 97112 NEUROMUSCULAR REEDUCATION: CPT

## 2022-11-09 PROCEDURE — 97016 VASOPNEUMATIC DEVICE THERAPY: CPT

## 2022-11-09 PROCEDURE — 97110 THERAPEUTIC EXERCISES: CPT

## 2022-11-09 NOTE — FLOWSHEET NOTE
[facility], [address] [employer] [address] [place] [address], [address] [place] [address]  Phone: [phone]   Fax:     [phone]   Physical Therapy Re-Certification Plan of Care      Physical Therapy Daily Treatment Note  Date:  2022    Patient Name:  Randall St   \"JT\" :  1970  MRN: [id number]  Restrictions/Precautions:    Medical/Treatment Diagnosis Information:  Diagnosis: Primary osteoarthritis of left knee (M17.12)  Treatment Diagnosis: L knee pain D60.167  Insurance/Certification information:  PT Insurance Information: [facility]  Physician Information:   Gogo Cheng  Has the plan of care been signed (Y/N):        []  Yes  [x]  No     Date of Patient follow up with Physician: per md, mid Oct (not scheduled yet)      Is this a Progress Report:     []  Yes  [x]  No        If Yes:  Date Range for reporting period:  Beginning  Ending    Progress report will be due (10 Rx or 30 days whichever is less):        Recertification will be due (POC Duration  / 90 days whichever is less):          Visit # Insurance Allowable Auth Required   14 New insurance waiting on card []  Yes []  No        Functional Scale: FOTO: 55/100    Date assessed:  10/19       Latex Allergy:  [x]NO      []YES  Preferred Language for Healthcare:   [x]English       []other:    Pain level:  1-2/10     SUBJECTIVE:  Pt reports continued improvement with taping. Pt also notes he has been able to get VMO to contract with NMES at home.      OBJECTIVE:   10/19 ROM knee flex 140 ° , knee ext 0 °   Observation: portal sites healing well; pt amb with no crutches; ? pain (although improved) with LLE initial swing phase of gait     RESTRICTIONS/PRECAUTIONS: acitivty moderation     Exercises/Interventions:   Exercise/Equipment Resistance/Repetitions Other comments   Stretching     Hamstring [id number]    Towel Pull gastroc [id number] slant   Inclined Calf     Hip Flexion     ITB     Groin SLR     Supine 3x10 2# ^ 10/19   Abduction 3x10 2# ^ 10/19   Adduction     Prone     SLR+     clamshell 3x10  ^ 10/17, black TB   Isometrics     Quad sets with NMES (symmtric biphasic) 10\" hold x10 min TAPE for lateral tracking patella 1st, then NMES        Patellar Glides     Medial     Superior     Inferior          ROM        Hang Weights     Passive     Active     Weight Shift                       CKC     Calf raises 3x10 Added 9/19   Wall sits + add iso 10\"x10 Changed 10/31   1 leg stand 30\"x3 on foam, alt R/L ^10/26   Squatting     CC TKE 5\"x30 ^ 11/2 CC level 6   Bridges + ADD iso 10\"x10  Added 9/28, changed 10/31        PRE     Extension  RANGE:   Flexion  RANGE:        Quantum machines     Leg press      Leg extension     Leg curl          Manual interventions                     Therapeutic Exercise and NMR EXR  [x] (63980) Provided verbal/tactile cueing for activities related to strengthening, flexibility, endurance, ROM for improvements in LE, proximal hip, and core control with self care, mobility, lifting, ambulation.  [] (95748) Provided verbal/tactile cueing for activities related to improving balance, coordination, kinesthetic sense, posture, motor skill, proprioception  to assist with LE, proximal hip, and core control in self care, mobility, lifting, ambulation and eccentric single leg control.      NMR and Therapeutic Activities:    [] (71101 or 40353) Provided verbal/tactile cueing for activities related to improving balance, coordination, kinesthetic sense, posture, motor skill, proprioception and motor activation to allow for proper function of core, proximal hip and LE with self care and ADLs  [] (65236) Gait Re-education- Provided training and instruction to the patient for proper LE, core and proximal hip recruitment and positioning and eccentric body weight control with ambulation re-education including up and down stairs     Home Exercise Program:    [x] (74609) Reviewed/Progressed HEP activities related to strengthening, flexibility, endurance, ROM of core, proximal hip and LE for functional self-care, mobility, lifting and ambulation/stair navigation   [] (32498)Reviewed/Progressed HEP activities related to improving balance, coordination, kinesthetic sense, posture, motor skill, proprioception of core, proximal hip and LE for self care, mobility, lifting, and ambulation/stair navigation      Manual Treatments:  PROM / STM / Oscillations-Mobs:  G-I, II, III, IV (PA's, Inf., Post.)  [x] (97711) Provided manual therapy to mobilize LE, proximal hip and/or LS spine soft tissue/joints for the purpose of modulating pain, promoting relaxation,  increasing ROM, reducing/eliminating soft tissue swelling/inflammation/restriction, improving soft tissue extensibility and allowing for proper ROM for normal function with self care, mobility, lifting and ambulation. Modalities:  vaso 15    Charges:  Timed Code Treatment Minutes: 50   Total Treatment Minutes: 65       [] EVAL (LOW) 29028 (typically 20 minutes face-to-face)  [] EVAL (MOD) 93964 (typically 30 minutes face-to-face)  [] EVAL (HIGH) 62642 (typically 45 minutes face-to-face)  [] RE-EVAL   [x] QR(68164) x  2   [] IONTO  [x] NMR (96290) x  1   [x] VASO  [] Manual (07964) x      [] Other:  [] TA x      [] Mech Traction (72240)  [] ES(attended) (87120)      [] ES (un) (91317):     GOALS:   Patient stated goal: return to community activities     [] Progressing: [] Met: [] Not Met: [] Adjusted     Therapist goals for Patient:   Short Term Goals: To be achieved in: 2-4 weeks  1. Independent in HEP and progression per patient tolerance, in order to prevent re-injury. [] Progressing: [] Met: [] Not Met: [] Adjusted   2. Patient will have a decrease in pain to facilitate improvement in movement, function, and ADLs as indicated by Functional Deficits. [] Progressing: [] Met: [] Not Met: [] Adjusted     Long Term Goals:  To be achieved in: 12 weeks  1. Foto68/100to assist with reaching prior level of function. [] Progressing: [] Met: [] Not Met: [] Adjusted  2. Patient will demonstrate increased AROM to =R to allow for proper joint functioning as indicated by patients Functional Deficits. [] Progressing: [] Met: [] Not Met: [] Adjusted  3. Patient will demonstrate an increase in Strength to good proximal hip strength and control  in LE to allow for proper functional mobility as indicated by patients Functional Deficits. [] Progressing: [] Met: [] Not Met: [] Adjusted  4. Patient will return to normal heel to toe gait in community  ascend descend 12 steps alternating  functional activities without increased symptoms or restriction. [] Progressing: [] Met: [] Not Met: [] Adjusted  Overall Progression Towards Functional goals/ Treatment Progress Update:  [x] Patient is progressing as expected towards functional goals listed. [] Progression is slowed due to complexities/Impairments listed. [] Progression has been slowed due to co-morbidities. [] Plan just implemented, too soon to assess goals progression <30days   [] Goals require adjustment due to lack of progress  [] Patient is not progressing as expected and requires additional follow up with physician  [] Other    Prognosis for POC: [x] Good [] Fair  [] Poor      Patient requires continued skilled intervention: [x] Yes  [] No    Treatment/Activity Tolerance:  [x] Patient able to complete treatment  [] Patient limited by fatigue  [] Patient limited by pain     [] Patient limited by other medical complications  [x] Other: Pt exhibits impaired firing of quad, specifically the VMO, contributing to lateral tracking patella and continued catching feeling and residual pain and soreness post op. PT and pt are focused on firing pattern of quad with NMES, taping, and specific exercise.      Patient Education:  ITB rolling with foam roller or rolling pin    Reviewed diagnosis, POC, HEP and its importance. Access Code: [email or web address]  URL: [email or web address]  Date: 09/16/2022  Prepared by: Osmany Mota     Exercises  Long Sitting Ankle Pumps - 8 x daily - 7 x weekly - 3 sets - 10 reps - 1 hold  Long Sitting Calf Stretch with Strap - 2-3 x daily - 7 x weekly - 5 reps - 30 hold  Seated Table Hamstring Stretch - 2-3 x daily - 7 x weekly - 5 reps - 30 hold  Long Sitting Quad Set - 2-3 x daily - 7 x weekly - 10 reps - 10 hold  Active Straight Leg Raise with Quad Set - 2-3 x daily - 7 x weekly - 3 sets - 10 reps  Sidelying Hip Abduction - 2-3 x daily - 7 x weekly - 3 sets - 10 reps  Sitting Heel Slide with Towel - 2-3 x daily - 7 x weekly - 10 reps - 10 hold     PLAN: See eval  [x] Continue per plan of care [] Alter current plan (see comments above)  [] Plan of care initiated [] Hold pending MD visit [] Discharge      Electronically signed by:  Nica Jean PT    Note: If patient does not return for scheduled/ recommended follow up visits, this note will serve as a discharge from care along with most recent update on progress.

## 2022-11-14 ENCOUNTER — HOSPITAL ENCOUNTER (OUTPATIENT)
Dept: PHYSICAL THERAPY | Age: 52
Setting detail: THERAPIES SERIES
Discharge: HOME | End: 2022-11-14
Payer: COMMERCIAL

## 2022-11-14 PROCEDURE — 97112 NEUROMUSCULAR REEDUCATION: CPT

## 2022-11-14 PROCEDURE — 97016 VASOPNEUMATIC DEVICE THERAPY: CPT

## 2022-11-14 PROCEDURE — 97110 THERAPEUTIC EXERCISES: CPT

## 2022-11-14 NOTE — FLOWSHEET NOTE
The [address] [place] [address],[address], [address], [address] [place] [address]  Phone: [phone]   Fax:     [phone]   Physical Therapy Re-Certification Plan of Care      Physical Therapy Daily Treatment Note  Date:  2022    Patient Name:  Marah Johnson   \"JT\" :  1970  MRN: [id number]  Restrictions/Precautions:    Medical/Treatment Diagnosis Information:  Diagnosis: Primary osteoarthritis of left knee (M17.12)  Treatment Diagnosis: L knee pain F22.405  Insurance/Certification information:  PT Insurance Information: [facility]  Physician Information:   Rafi Weaver  Has the plan of care been signed (Y/N):        []  Yes  [x]  No     Date of Patient follow up with Physician: per md, mid Oct (not scheduled yet)      Is this a Progress Report:     []  Yes  [x]  No        If Yes:  Date Range for reporting period:  Beginning  Ending    Progress report will be due (10 Rx or 30 days whichever is less): 70/10       Recertification will be due (POC Duration  / 90 days whichever is less):          Visit # Insurance Allowable Auth Required   15 New insurance waiting on card []  Yes []  No        Functional Scale: FOTO: 55/100    Date assessed:  10/19       Latex Allergy:  [x]NO      []YES  Preferred Language for Healthcare:   [x]English       []other:    Pain level:  1/10     SUBJECTIVE:  Pt reports improvement with taping. Pt notes VMO continues to have difficulty leonila, even with NMES machine, which he uses daily. Pt also notes than since he didn't wear tape yesterday (per PT instruction to give skin a break), he noticed ? instability.      OBJECTIVE:   10/19 ROM knee flex 140 ° , knee ext 0 °   Observation: portal sites healing well; pt amb with no crutches; ? pain (although improved) with LLE initial swing phase of gait     RESTRICTIONS/PRECAUTIONS: acitivty moderation     Exercises/Interventions:   Exercise/Equipment Resistance/Repetitions Other comments   Stretching     Hamstring [id number]    Towel Pull gastroc [id number] slant   Inclined Calf     Hip Flexion     ITB     Groin                       SLR     Supine 3x10 2.5# ^ 11/7   Abduction 3x10 2.5# ^ 11/7   Adduction     Prone     SLR+     clamshell 3x10  ^ 10/17, black TB   Isometrics     Quad sets with NMES (symmtric biphasic) 10\" hold x10 min TAPE for lateral tracking patella 1st, then NMES        Patellar Glides     Medial     Superior     Inferior          ROM        Hang Weights     Passive     Active     Weight Shift                       CKC     Calf raises 3x10 Added 9/19   Wall sits + add iso 10\"x10 Changed 10/31   1 leg stand 30\"x3 on foam, alt R/L ^10/26   Squatting     CC TKE 5\"x30 ^ 11/2 CC level 6   Bridges + ADD iso 10\"x10  Added 9/28, changed 10/31    held this visit (11/16) 4\", added 11/9   PRE     Extension  RANGE:   Flexion  RANGE:        Quantum machines     Leg press  3x10 #, added 11/16   Leg extension     Leg curl          Manual interventions                     Therapeutic Exercise and NMR EXR  [x] (08705) Provided verbal/tactile cueing for activities related to strengthening, flexibility, endurance, ROM for improvements in LE, proximal hip, and core control with self care, mobility, lifting, ambulation.  [] (51504) Provided verbal/tactile cueing for activities related to improving balance, coordination, kinesthetic sense, posture, motor skill, proprioception  to assist with LE, proximal hip, and core control in self care, mobility, lifting, ambulation and eccentric single leg control.      NMR and Therapeutic Activities:    [] (70021 or 27748) Provided verbal/tactile cueing for activities related to improving balance, coordination, kinesthetic sense, posture, motor skill, proprioception and motor activation to allow for proper function of core, proximal hip and LE with self care and ADLs  [] (80182) Gait Re-education- Provided training and instruction to the patient for proper LE, core and proximal hip recruitment and positioning and eccentric body weight control with ambulation re-education including up and down stairs     Home Exercise Program:    [x] (69096) Reviewed/Progressed HEP activities related to strengthening, flexibility, endurance, ROM of core, proximal hip and LE for functional self-care, mobility, lifting and ambulation/stair navigation   [] (85374)Reviewed/Progressed HEP activities related to improving balance, coordination, kinesthetic sense, posture, motor skill, proprioception of core, proximal hip and LE for self care, mobility, lifting, and ambulation/stair navigation      Manual Treatments:  PROM / STM / Oscillations-Mobs:  G-I, II, III, IV (PA's, Inf., Post.)  [x] (55013) Provided manual therapy to mobilize LE, proximal hip and/or LS spine soft tissue/joints for the purpose of modulating pain, promoting relaxation,  increasing ROM, reducing/eliminating soft tissue swelling/inflammation/restriction, improving soft tissue extensibility and allowing for proper ROM for normal function with self care, mobility, lifting and ambulation. Modalities:  vaso 15    Charges:  Timed Code Treatment Minutes: 50   Total Treatment Minutes: 65       [] EVAL (LOW) 64469 (typically 20 minutes face-to-face)  [] EVAL (MOD) 41257 (typically 30 minutes face-to-face)  [] EVAL (HIGH) 50264 (typically 45 minutes face-to-face)  [] RE-EVAL   [x] DL(14533) x  2   [] IONTO  [x] NMR (20704) x  1   [x] VASO  [] Manual (47590) x      [] Other:  [] TA x      [] Mech Traction (10615)  [] ES(attended) (74253)      [] ES (un) (50691):     GOALS:   Patient stated goal: return to community activities     [] Progressing: [] Met: [] Not Met: [] Adjusted     Therapist goals for Patient:   Short Term Goals: To be achieved in: 2-4 weeks  1. Independent in HEP and progression per patient tolerance, in order to prevent re-injury. [] Progressing: [] Met: [] Not Met: [] Adjusted   2.  Patient will have a decrease in pain to facilitate improvement in movement, function, and ADLs as indicated by Functional Deficits. [] Progressing: [] Met: [] Not Met: [] Adjusted     Long Term Goals: To be achieved in: 12 weeks  1. Foto68/100to assist with reaching prior level of function. [] Progressing: [] Met: [] Not Met: [] Adjusted  2. Patient will demonstrate increased AROM to =R to allow for proper joint functioning as indicated by patients Functional Deficits. [] Progressing: [] Met: [] Not Met: [] Adjusted  3. Patient will demonstrate an increase in Strength to good proximal hip strength and control  in LE to allow for proper functional mobility as indicated by patients Functional Deficits. [] Progressing: [] Met: [] Not Met: [] Adjusted  4. Patient will return to normal heel to toe gait in community  ascend descend 12 steps alternating  functional activities without increased symptoms or restriction. [] Progressing: [] Met: [] Not Met: [] Adjusted  Overall Progression Towards Functional goals/ Treatment Progress Update:  [x] Patient is progressing as expected towards functional goals listed. [] Progression is slowed due to complexities/Impairments listed. [] Progression has been slowed due to co-morbidities.   [] Plan just implemented, too soon to assess goals progression <30days   [] Goals require adjustment due to lack of progress  [] Patient is not progressing as expected and requires additional follow up with physician  [] Other    Prognosis for POC: [x] Good [] Fair  [] Poor      Patient requires continued skilled intervention: [x] Yes  [] No    Treatment/Activity Tolerance:  [x] Patient able to complete treatment  [] Patient limited by fatigue  [] Patient limited by pain     [] Patient limited by other medical complications  [x] Other: Pt exhibits impaired firing of quad, specifically the VMO, contributing to lateral tracking patella and continued catching feeling and residual pain and soreness post op. PT and pt are focused on firing pattern of quad with NMES, taping, and specific exercise. Patient Education:  ITB rolling with foam roller or rolling pin    Reviewed diagnosis, POC, HEP and its importance. Access Code: [email or web address]  URL: [email or web address]  Date: 09/16/2022  Prepared by: Ailyn Meals     Exercises  Long Sitting Ankle Pumps - 8 x daily - 7 x weekly - 3 sets - 10 reps - 1 hold  Long Sitting Calf Stretch with Strap - 2-3 x daily - 7 x weekly - 5 reps - 30 hold  Seated Table Hamstring Stretch - 2-3 x daily - 7 x weekly - 5 reps - 30 hold  Long Sitting Quad Set - 2-3 x daily - 7 x weekly - 10 reps - 10 hold  Active Straight Leg Raise with Quad Set - 2-3 x daily - 7 x weekly - 3 sets - 10 reps  Sidelying Hip Abduction - 2-3 x daily - 7 x weekly - 3 sets - 10 reps  Sitting Heel Slide with Towel - 2-3 x daily - 7 x weekly - 10 reps - 10 hold     PLAN: See eval  [x] Continue per plan of care [] Alter current plan (see comments above)  [] Plan of care initiated [] Hold pending MD visit [] Discharge      Electronically signed by:  Tasha Hercules, PT    Note: If patient does not return for scheduled/ recommended follow up visits, this note will serve as a discharge from care along with most recent update on progress.

## 2022-11-16 ENCOUNTER — HOSPITAL ENCOUNTER (OUTPATIENT)
Dept: PHYSICAL THERAPY | Age: 52
Setting detail: THERAPIES SERIES
Discharge: HOME | End: 2022-11-16
Payer: COMMERCIAL

## 2022-11-16 PROCEDURE — 97016 VASOPNEUMATIC DEVICE THERAPY: CPT

## 2022-11-16 PROCEDURE — 97110 THERAPEUTIC EXERCISES: CPT

## 2022-11-16 PROCEDURE — 97112 NEUROMUSCULAR REEDUCATION: CPT

## 2022-11-16 NOTE — FLOWSHEET NOTE
The [address] [place], [place] [address], [address] [id number] [facility], [address] [place] [address]  Phone: [phone]   Fax:     [phone]   Physical Therapy Re-Certification Plan of Care      Physical Therapy Daily Treatment Note  Date:  2022    Patient Name:  Randy Velasco   \"JT\" :  1970  MRN: [id number]  Restrictions/Precautions:    Medical/Treatment Diagnosis Information:  Diagnosis: Primary osteoarthritis of left knee (M17.12)  Treatment Diagnosis: L knee pain O39.241  Insurance/Certification information:  PT Insurance Information: [facility]  Physician Information:   Reymundo Calvert  Has the plan of care been signed (Y/N):        []  Yes  [x]  No     Date of Patient follow up with Physician: per md, mid Oct (not scheduled yet)      Is this a Progress Report:     []  Yes  [x]  No        If Yes:  Date Range for reporting period:  Beginning  Ending    Progress report will be due (10 Rx or 30 days whichever is less):        Recertification will be due (POC Duration  / 90 days whichever is less):          Visit # Insurance Allowable Auth Required   19 60 []  Yes []  No        Functional Scale: FOTO: 55/100    Date assessed:  10/19       Latex Allergy:  [x]NO      []YES  Preferred Language for Healthcare:   [x]English       []other:    Pain level:  1/10     SUBJECTIVE:  Pt reports his knee feels better and notes VMO contraction on his own.      DOS: 9/15/22    OBJECTIVE:   10/19 ROM knee flex 140 ° , knee ext 0 °   Observation: portal sites healing well; pt amb with no crutches; ? pain (although improved) with LLE initial swing phase of gait     RESTRICTIONS/PRECAUTIONS: acitivty moderation     Exercises/Interventions:   Exercise/Equipment Resistance/Repetitions Other comments   Stretching     Hamstring [id number]    Towel Pull gastroc [id number] slant   Inclined Calf     Hip Flexion     ITB     Groin                       SLR     Supine 3x10 (10\" hold first rep) 2.5# ^ 11/16   Abduction 3x10 2.5# ^ 11/7   Adduction     Prone     SLR+     clamshell 3x10  ^ 10/17, black TB   LAQ 2x10 Added 11/16, 2.5#, slow control    Isometrics     Quad sets with NMES (symmtric biphasic) 10\" hold x10 min TAPE for lateral tracking patella 1st, then NMES        Patellar Glides     Medial     Superior     Inferior          ROM        Hang Weights     Passive     Active     Weight Shift                       CKC     Calf raises 3x10 Added 9/19   Wall sits + add iso 10\"x10 Changed 10/31   1 leg stand 30\"x3 on foam, alt R/L ^10/26   Squatting     CC TKE 5\"x30 ^ 11/2 CC level 6   Bridges + ADD iso on SB 10\"x10  ^ 11/16   blackTB, added 11/14    held this visit (11/16) 4\", added 11/9   PRE     Extension  RANGE:   Flexion  RANGE:        Quantum machines     Leg press  3x10 #, ^11/16, cues for maintaining knee alignment, challenge noted   Leg extension     Leg curl          Manual interventions                     Therapeutic Exercise and NMR EXR  [x] (58696) Provided verbal/tactile cueing for activities related to strengthening, flexibility, endurance, ROM for improvements in LE, proximal hip, and core control with self care, mobility, lifting, ambulation.  [] (37014) Provided verbal/tactile cueing for activities related to improving balance, coordination, kinesthetic sense, posture, motor skill, proprioception  to assist with LE, proximal hip, and core control in self care, mobility, lifting, ambulation and eccentric single leg control.      NMR and Therapeutic Activities:    [] (48421 or 93676) Provided verbal/tactile cueing for activities related to improving balance, coordination, kinesthetic sense, posture, motor skill, proprioception and motor activation to allow for proper function of core, proximal hip and LE with self care and ADLs  [] (63099) Gait Re-education- Provided training and instruction to the patient for proper LE, core and proximal hip recruitment and positioning and eccentric body weight control with ambulation re-education including up and down stairs     Home Exercise Program:    [x] (91500) Reviewed/Progressed HEP activities related to strengthening, flexibility, endurance, ROM of core, proximal hip and LE for functional self-care, mobility, lifting and ambulation/stair navigation   [] (89749)Reviewed/Progressed HEP activities related to improving balance, coordination, kinesthetic sense, posture, motor skill, proprioception of core, proximal hip and LE for self care, mobility, lifting, and ambulation/stair navigation      Manual Treatments:  PROM / STM / Oscillations-Mobs:  G-I, II, III, IV (PA's, Inf., Post.)  [x] (11935) Provided manual therapy to mobilize LE, proximal hip and/or LS spine soft tissue/joints for the purpose of modulating pain, promoting relaxation,  increasing ROM, reducing/eliminating soft tissue swelling/inflammation/restriction, improving soft tissue extensibility and allowing for proper ROM for normal function with self care, mobility, lifting and ambulation. Modalities:  vaso 15    Charges:  Timed Code Treatment Minutes: 50   Total Treatment Minutes: 65       [] EVAL (LOW) 65145 (typically 20 minutes face-to-face)  [] EVAL (MOD) 05933 (typically 30 minutes face-to-face)  [] EVAL (HIGH) 96085 (typically 45 minutes face-to-face)  [] RE-EVAL   [x] ER(18962) x  2   [] IONTO  [x] NMR (44386) x  1   [x] VASO  [] Manual (01849) x      [] Other:  [] TA x      [] Mech Traction (35847)  [] ES(attended) (50171)      [] ES (un) (41580):     GOALS:   Patient stated goal: return to community activities     [] Progressing: [] Met: [] Not Met: [] Adjusted     Therapist goals for Patient:   Short Term Goals: To be achieved in: 2-4 weeks  1. Independent in HEP and progression per patient tolerance, in order to prevent re-injury. [] Progressing: [] Met: [] Not Met: [] Adjusted   2.  Patient will have a decrease in pain to facilitate improvement in movement, function, and ADLs as indicated by Functional Deficits. [] Progressing: [] Met: [] Not Met: [] Adjusted     Long Term Goals: To be achieved in: 12 weeks  1. Foto68/100to assist with reaching prior level of function. [] Progressing: [] Met: [] Not Met: [] Adjusted  2. Patient will demonstrate increased AROM to =R to allow for proper joint functioning as indicated by patients Functional Deficits. [] Progressing: [] Met: [] Not Met: [] Adjusted  3. Patient will demonstrate an increase in Strength to good proximal hip strength and control  in LE to allow for proper functional mobility as indicated by patients Functional Deficits. [] Progressing: [] Met: [] Not Met: [] Adjusted  4. Patient will return to normal heel to toe gait in community  ascend descend 12 steps alternating  functional activities without increased symptoms or restriction. [] Progressing: [] Met: [] Not Met: [] Adjusted  Overall Progression Towards Functional goals/ Treatment Progress Update:  [x] Patient is progressing as expected towards functional goals listed. [] Progression is slowed due to complexities/Impairments listed. [] Progression has been slowed due to co-morbidities. [] Plan just implemented, too soon to assess goals progression <30days   [] Goals require adjustment due to lack of progress  [] Patient is not progressing as expected and requires additional follow up with physician  [] Other    Prognosis for POC: [x] Good [] Fair  [] Poor      Patient requires continued skilled intervention: [x] Yes  [] No    Treatment/Activity Tolerance:  [x] Patient able to complete treatment  [] Patient limited by fatigue  [] Patient limited by pain     [] Patient limited by other medical complications  [x] Other: Pt exhibits impaired firing of quad, specifically the VMO, contributing to lateral tracking patella and continued catching feeling and residual pain and soreness post op.  PT and pt are focused on firing pattern of quad with NMES, taping, and specific exercise. Patient Education:  ITB rolling with foam roller or rolling pin    Access Code: [email or web address]  URL: [email or web address]  Date: 11/16/2022  Prepared by: Moriah Galdamez    Program Notes  ITB rolling x3-5 min    Exercises  Long Sitting Quad Set - 1 x daily - 7 x weekly - 10 reps - 10 hold  Active Straight Leg Raise with Quad Set - 1 x daily - 7 x weekly - 3 sets - 10 reps - 10 sec first rep hold  Sidelying Hip Abduction - 1 x daily - 7 x weekly - 3 sets - 10 reps  Clamshell with Resistance - 1 x daily - 7 x weekly - 3 sets - 10 reps  Bridge with Upper Back on Swiss Ball - 1 x daily - 7 x weekly - 3 sets - 10 reps  Heel Raise on Step - 1 x daily - 7 x weekly - 3 sets - 10 reps  Standing Terminal Knee Extension with Resistance - 1 x daily - 7 x weekly - 3 sets - 10 reps - 5 sec hold  Wall Squat with Ball between Knees - 1 x daily - 7 x weekly - 1 sets - 10 reps - 10 sec hold  Single Leg Stance on Foam Pad - 1 x daily - 7 x weekly - 1 sets - 3 reps - 30 sec hold  Seated Long Arc Quad - 1 x daily - 7 x weekly - 2 sets - 10 reps  Prone Quadriceps Stretch - 1 x daily - 7 x weekly - 1 sets - 2 reps - 30 sec hold  Seated Hamstring Stretch - 1 x daily - 7 x weekly - 1 sets - 3 reps - 30 sec hold  Standing Gastroc Stretch on Step - 1 x daily - 7 x weekly - 1 sets - 3 reps - 30 sec hold     PLAN: See eval  [x] Continue per plan of care [] Alter current plan (see comments above)  [] Plan of care initiated [] Hold pending MD visit [] Discharge      Electronically signed by:  Elberta Moritz, PT    Note: If patient does not return for scheduled/ recommended follow up visits, this note will serve as a discharge from care along with most recent update on progress.

## 2022-11-21 ENCOUNTER — HOSPITAL ENCOUNTER (OUTPATIENT)
Dept: PHYSICAL THERAPY | Age: 52
Setting detail: THERAPIES SERIES
Discharge: HOME | End: 2022-11-21
Payer: COMMERCIAL

## 2022-11-21 PROCEDURE — 97016 VASOPNEUMATIC DEVICE THERAPY: CPT

## 2022-11-21 PROCEDURE — 97110 THERAPEUTIC EXERCISES: CPT

## 2022-11-21 PROCEDURE — 97112 NEUROMUSCULAR REEDUCATION: CPT

## 2022-11-21 NOTE — FLOWSHEET NOTE
The [address] [place], [place] [address], [address], [address] [place] [address]  Phone: [phone]   Fax:     [phone]      Physical Therapy Daily Treatment Note  Date:  2022    Patient Name:  Patricia Mckeon   \"JT\" :  1970  MRN: [id number]  Restrictions/Precautions:    Medical/Treatment Diagnosis Information:  Diagnosis: Primary osteoarthritis of left knee (M17.12)  Treatment Diagnosis: L knee pain S36.066  Insurance/Certification information:  PT Insurance Information: [facility]  Physician Information:   Crissy Reynolds  Has the plan of care been signed (Y/N):        []  Yes  [x]  No     Date of Patient follow up with Physician: per md, mid Oct (not scheduled yet)      Is this a Progress Report:     []  Yes  [x]  No        If Yes:  Date Range for reporting period:  Beginning   Ending     Progress report will be due (10 Rx or 30 days whichever is less):        Recertification will be due (POC Duration  / 90 days whichever is less):          Visit # Insurance Allowable Auth Required   20 60 []  Yes []  No        Functional Scale: FOTO: 55/100    Date assessed:  10/19       Latex Allergy:  [x]NO      []YES  Preferred Language for Healthcare:   [x]English       []other:    Pain level:  1/10     SUBJECTIVE:  Pt reports he does feel tight when he tries to bend his knee with the tape on so the quad stretch is the only one he cannot do with the tape on. Pt reports he continues to be compliant with HEP (takes 1-1.25 hours, including stim machine).      DOS: 9/15/22    OBJECTIVE:    ROM knee flex 121 ° , knee ext 0 ° ** note that pt has not been stretching his knee due to wearing tape   Observation: portal sites healing well; pt amb with no crutches; ? pain (although improved) with LLE initial swing phase of gait     RESTRICTIONS/PRECAUTIONS: acitivty moderation     Exercises/Interventions: HEP updated   Exercise/Equipment Resistance/Repetitions Other comments   Stretching     Hamstring [id number]    Towel Pull gastroc [id number] slant   Inclined Calf     Hip Flexion     ITB     Groin                       SLR     Supine 3x10 (10\" hold first rep) 2.5# ^ 11/16   Abduction 3x10 2.5# ^ 11/16   Adduction     Prone     SLR+     clamshell 3x10  ^ 10/17, black TB   LAQ 2x10 Added 11/16, 2.5#, slow control    Isometrics     Quad sets with NMES (symmtric biphasic) 10\" hold x10 min TAPE for lateral tracking patella 2nd, then NMES        Patellar Glides     Medial     Superior     Inferior          ROM     Sheet Pulls [id number] This ex 1st , 0-121 °, discussed to re-introduce this ex prior to re-taping at home (11/21)   Hang Weights     Passive     Active     Weight Shift                       CKC     Calf raises 3x10 on slantboard Added 9/19   Wall sits + add iso 10\"x10 Changed 10/31   1 leg stand 30\"x3 on foam, alt R/L ^10/26   Squatting     CC TKE 5\"x30 ^ 11/2 CC level 6   Bridges + ADD iso on SB 10\"x10  ^ 11/16   blackTB, added 11/14    held this visit (11/16) 4\", added 11/9   PRE     Extension  RANGE:   Flexion  RANGE:        Quantum machines     Leg press  3x10 #, ^11/16, cues for maintaining knee alignment, challenge noted   Leg extension     Leg curl          Manual interventions                     Therapeutic Exercise and NMR EXR  [x] (32166) Provided verbal/tactile cueing for activities related to strengthening, flexibility, endurance, ROM for improvements in LE, proximal hip, and core control with self care, mobility, lifting, ambulation.  [] (88420) Provided verbal/tactile cueing for activities related to improving balance, coordination, kinesthetic sense, posture, motor skill, proprioception  to assist with LE, proximal hip, and core control in self care, mobility, lifting, ambulation and eccentric single leg control.      NMR and Therapeutic Activities:    [] (52031 or ) Provided verbal/tactile cueing for activities related to improving balance, coordination, kinesthetic sense, posture, motor skill, proprioception and motor activation to allow for proper function of core, proximal hip and LE with self care and ADLs  [] (22881) Gait Re-education- Provided training and instruction to the patient for proper LE, core and proximal hip recruitment and positioning and eccentric body weight control with ambulation re-education including up and down stairs     Home Exercise Program:    [x] (92195) Reviewed/Progressed HEP activities related to strengthening, flexibility, endurance, ROM of core, proximal hip and LE for functional self-care, mobility, lifting and ambulation/stair navigation   [] (00384)Reviewed/Progressed HEP activities related to improving balance, coordination, kinesthetic sense, posture, motor skill, proprioception of core, proximal hip and LE for self care, mobility, lifting, and ambulation/stair navigation      Manual Treatments:  PROM / STM / Oscillations-Mobs:  G-I, II, III, IV (PA's, Inf., Post.)  [x] (11803) Provided manual therapy to mobilize LE, proximal hip and/or LS spine soft tissue/joints for the purpose of modulating pain, promoting relaxation,  increasing ROM, reducing/eliminating soft tissue swelling/inflammation/restriction, improving soft tissue extensibility and allowing for proper ROM for normal function with self care, mobility, lifting and ambulation.      Modalities:  vaso 15    Charges:  Timed Code Treatment Minutes: 50   Total Treatment Minutes: 65       [] EVAL (LOW) 26414 (typically 20 minutes face-to-face)  [] EVAL (MOD) 99085 (typically 30 minutes face-to-face)  [] EVAL (HIGH) 52981 (typically 45 minutes face-to-face)  [] RE-EVAL   [x] GY(10021) x  2   [] IONTO  [x] NMR (07473) x  1   [x] VASO  [] Manual (76589) x      [] Other:  [] TA x      [] Mech Traction (65252)  [] ES(attended) (41812)      [] ES (un) (33386):     GOALS:   Patient stated goal: return to community activities     [x] Progressing: [] Met: [] Not Met: [] Adjusted     Therapist goals for Patient:   Short Term Goals: To be achieved in: 2-4 weeks  1. Independent in HEP and progression per patient tolerance, in order to prevent re-injury. [x] Progressing: [] Met: [] Not Met: [] Adjusted   2. Patient will have a decrease in pain to facilitate improvement in movement, function, and ADLs as indicated by Functional Deficits. [x] Progressing: [] Met: [] Not Met: [] Adjusted     Long Term Goals: To be achieved in: 12 weeks  1. Foto68/100to assist with reaching prior level of function. [x] Progressing: [] Met: [] Not Met: [] Adjusted  2. Patient will demonstrate increased AROM to =R to allow for proper joint functioning as indicated by patients Functional Deficits. [x] Progressing: [] Met: [] Not Met: [] Adjusted  3. Patient will demonstrate an increase in Strength to good proximal hip strength and control  in LE to allow for proper functional mobility as indicated by patients Functional Deficits. [x] Progressing: [] Met: [] Not Met: [] Adjusted  4. Patient will return to normal heel to toe gait in community  ascend descend 12 steps alternating  functional activities without increased symptoms or restriction. [x] Progressing: [] Met: [] Not Met: [] Adjusted  Overall Progression Towards Functional goals/ Treatment Progress Update:  [] Patient is progressing as expected towards functional goals listed. [x] Progression is slowed due to complexities/Impairments listed. [] Progression has been slowed due to co-morbidities.   [] Plan just implemented, too soon to assess goals progression <30days   [] Goals require adjustment due to lack of progress  [] Patient is not progressing as expected and requires additional follow up with physician  [] Other    Prognosis for POC: [x] Good [] Fair  [] Poor      Patient requires continued skilled intervention: [x] Yes  [] No    Treatment/Activity Tolerance:  [x] Patient able to complete treatment  [] Patient limited by fatigue  [] Patient limited by pain     [] Patient limited by other medical complications  [x] Other: Pt exhibits impaired firing of quad, specifically the VMO, contributing to lateral tracking patella and continued catching feeling and residual pain and soreness post op. PT and pt are focused on firing pattern of quad with NMES, taping, and specific exercise. Patient Education:  ITB rolling with foam roller or rolling pin    Access Code: [email or web address]  URL: [employer]/  Date: 11/16/2022  Prepared by: Arin Easton    Program Notes  ITB rolling x3-5 min    Exercises  Long Sitting Quad Set - 1 x daily - 7 x weekly - 10 reps - 10 hold  Active Straight Leg Raise with Quad Set - 1 x daily - 7 x weekly - 3 sets - 10 reps - 10 sec first rep hold  Sidelying Hip Abduction - 1 x daily - 7 x weekly - 3 sets - 10 reps  Clamshell with Resistance - 1 x daily - 7 x weekly - 3 sets - 10 reps  Bridge with Upper Back on Swiss Ball - 1 x daily - 7 x weekly - 3 sets - 10 reps  Heel Raise on Step - 1 x daily - 7 x weekly - 3 sets - 10 reps  Standing Terminal Knee Extension with Resistance - 1 x daily - 7 x weekly - 3 sets - 10 reps - 5 sec hold  Wall Squat with Ball between Knees - 1 x daily - 7 x weekly - 1 sets - 10 reps - 10 sec hold  Single Leg Stance on Foam Pad - 1 x daily - 7 x weekly - 1 sets - 3 reps - 30 sec hold  Seated Long Arc Quad - 1 x daily - 7 x weekly - 2 sets - 10 reps  Prone Quadriceps Stretch - 1 x daily - 7 x weekly - 1 sets - 2 reps - 30 sec hold  Seated Hamstring Stretch - 1 x daily - 7 x weekly - 1 sets - 3 reps - 30 sec hold  Standing Gastroc Stretch on Step - 1 x daily - 7 x weekly - 1 sets - 3 reps - 30 sec hold     PLAN: See eval  [x] Continue per plan of care [] Alter current plan (see comments above)  [] Plan of care initiated [] Hold pending MD visit [] Discharge      Electronically signed by:  Jaye Bustillos PT    Note: If patient does not return for scheduled/ recommended follow up visits, this note will serve as a discharge from care along with most recent update on progress.

## 2022-11-23 ENCOUNTER — HOSPITAL ENCOUNTER (OUTPATIENT)
Dept: PHYSICAL THERAPY | Age: 52
Setting detail: THERAPIES SERIES
Discharge: HOME | End: 2022-11-23
Payer: COMMERCIAL

## 2022-11-23 PROCEDURE — 97016 VASOPNEUMATIC DEVICE THERAPY: CPT

## 2022-11-23 PROCEDURE — 97112 NEUROMUSCULAR REEDUCATION: CPT

## 2022-11-23 PROCEDURE — 97110 THERAPEUTIC EXERCISES: CPT

## 2022-11-23 NOTE — FLOWSHEET NOTE
The [address] [place], [place] [address], [address], [address] [place] [address]  Phone: [phone]   Fax:     [phone]      Physical Therapy Daily Treatment Note  Date:  2022    Patient Name:  Eduin Arambula   \"JT\" :  1970  MRN: [id number]  Restrictions/Precautions:    Medical/Treatment Diagnosis Information:  Diagnosis: Primary osteoarthritis of left knee (M17.12)  Treatment Diagnosis: L knee pain E74.090  Insurance/Certification information:  PT Insurance Information: [facility]  Physician Information:   Juanita Ayala  Has the plan of care been signed (Y/N):        []  Yes  [x]  No     Date of Patient follow up with Physician: per md, mid Oct (not scheduled yet)      Is this a Progress Report:     []  Yes  [x]  No        If Yes:  Date Range for reporting period:  Beginning   Ending     Progress report will be due (10 Rx or 30 days whichever is less):        Recertification will be due (POC Duration  / 90 days whichever is less):          Visit # Insurance Allowable Auth Required   21 60 []  Yes []  No        Functional Scale: FOTO: 55/100    Date assessed:  10/19       Latex Allergy:  [x]NO      []YES  Preferred Language for Healthcare:   [x]English       []other:    Pain level:  1/10     SUBJECTIVE:  Pt reports his knee feels pretty good, when wearing the tape.      DOS: 9/15/22    OBJECTIVE:    ROM knee flex 121 ° , knee ext 0 ° * note that pt has not been stretching his knee due to wearing tape   Observation: portal sites healing well; pt amb with no crutches; ? pain (although improved) with LLE initial swing phase of gait     RESTRICTIONS/PRECAUTIONS: acitivty moderation     Exercises/Interventions: HEP updated   Exercise/Equipment Resistance/Repetitions Other comments   Stretching     Hamstring [id number]    Towel Pull gastroc [id number] slant   Inclined Calf     Hip Flexion     ITB     Groin                       SLR Supine 3x10 (10\" hold first rep) 3# ^ 11/23   Abduction 3x10 3# ^ 11/23   Adduction     Prone     SLR+     Clamshell + ABD iso 3x10  ^ 11/23, GTB   LAQ 2x10 ^11/23, 3#, slow control    Isometrics     Quad sets with NMES (symmtric biphasic) 10\" hold x10 min TAPE for lateral tracking patella 2nd, then NMES        Patellar Glides     Medial     Superior     Inferior          ROM     Sheet Pulls [id number] This ex 1st , 0-121 °, discussed to re-introduce this ex prior to re-taping at home (11/21)   Hang Weights     Passive     Active     Weight Shift                       CKC     Calf raises 3x10 on slantboard Added 9/19   Wall sits + add iso 10\"x10 Changed 10/31   1 leg stand 30\"x3 on BOSU, alt R/L ^11/23   Squatting     CC TKE 5\"x30 ^ 11/2 CC level 6   Bridges + ADD iso on SB 10\"x10  ^ 11/16   Sidestepping 3x15' blackTB, added 11/14    held this visit (11/16) 4\", added 11/9   PRE     Extension  RANGE:   Flexion  RANGE:        Quantum machines     Leg press  3x10 #, ^11/23, cues for maintaining knee alignment, challenge noted   Leg extension     Leg curl          Manual interventions                     Therapeutic Exercise and NMR EXR  [x] (90173) Provided verbal/tactile cueing for activities related to strengthening, flexibility, endurance, ROM for improvements in LE, proximal hip, and core control with self care, mobility, lifting, ambulation.  [] (76145) Provided verbal/tactile cueing for activities related to improving balance, coordination, kinesthetic sense, posture, motor skill, proprioception  to assist with LE, proximal hip, and core control in self care, mobility, lifting, ambulation and eccentric single leg control.      NMR and Therapeutic Activities:    [] (37089 or 65149) Provided verbal/tactile cueing for activities related to improving balance, coordination, kinesthetic sense, posture, motor skill, proprioception and motor activation to allow for proper function of core, proximal hip and LE with self care and ADLs  [] (96161) Gait Re-education- Provided training and instruction to the patient for proper LE, core and proximal hip recruitment and positioning and eccentric body weight control with ambulation re-education including up and down stairs     Home Exercise Program:    [x] (53553) Reviewed/Progressed HEP activities related to strengthening, flexibility, endurance, ROM of core, proximal hip and LE for functional self-care, mobility, lifting and ambulation/stair navigation   [] (43832)Reviewed/Progressed HEP activities related to improving balance, coordination, kinesthetic sense, posture, motor skill, proprioception of core, proximal hip and LE for self care, mobility, lifting, and ambulation/stair navigation      Manual Treatments:  PROM / STM / Oscillations-Mobs:  G-I, II, III, IV (PA's, Inf., Post.)  [x] (72416) Provided manual therapy to mobilize LE, proximal hip and/or LS spine soft tissue/joints for the purpose of modulating pain, promoting relaxation,  increasing ROM, reducing/eliminating soft tissue swelling/inflammation/restriction, improving soft tissue extensibility and allowing for proper ROM for normal function with self care, mobility, lifting and ambulation. Modalities:  vaso 15    Charges:  Timed Code Treatment Minutes: 50   Total Treatment Minutes: 65       [] EVAL (LOW) 54549 (typically 20 minutes face-to-face)  [] EVAL (MOD) 37985 (typically 30 minutes face-to-face)  [] EVAL (HIGH) 75084 (typically 45 minutes face-to-face)  [] RE-EVAL   [x] WP(53730) x  2   [] IONTO  [x] NMR (12527) x  1   [x] VASO  [] Manual (17763) x      [] Other:  [] TA x      [] Mech Traction (28817)  [] ES(attended) (99100)      [] ES (un) (53818):     GOALS:   Patient stated goal: return to community activities     [x] Progressing: [] Met: [] Not Met: [] Adjusted     Therapist goals for Patient:   Short Term Goals: To be achieved in: 2-4 weeks  1.  Independent in HEP and progression per patient tolerance, in order to prevent re-injury. [x] Progressing: [] Met: [] Not Met: [] Adjusted   2. Patient will have a decrease in pain to facilitate improvement in movement, function, and ADLs as indicated by Functional Deficits. [x] Progressing: [] Met: [] Not Met: [] Adjusted     Long Term Goals: To be achieved in: 12 weeks  1. Foto68/100to assist with reaching prior level of function. [x] Progressing: [] Met: [] Not Met: [] Adjusted  2. Patient will demonstrate increased AROM to =R to allow for proper joint functioning as indicated by patients Functional Deficits. [x] Progressing: [] Met: [] Not Met: [] Adjusted  3. Patient will demonstrate an increase in Strength to good proximal hip strength and control  in LE to allow for proper functional mobility as indicated by patients Functional Deficits. [x] Progressing: [] Met: [] Not Met: [] Adjusted  4. Patient will return to normal heel to toe gait in community  ascend descend 12 steps alternating  functional activities without increased symptoms or restriction. [x] Progressing: [] Met: [] Not Met: [] Adjusted  Overall Progression Towards Functional goals/ Treatment Progress Update:  [] Patient is progressing as expected towards functional goals listed. [x] Progression is slowed due to complexities/Impairments listed. [] Progression has been slowed due to co-morbidities.   [] Plan just implemented, too soon to assess goals progression <30days   [] Goals require adjustment due to lack of progress  [] Patient is not progressing as expected and requires additional follow up with physician  [] Other    Prognosis for POC: [x] Good [] Fair  [] Poor      Patient requires continued skilled intervention: [x] Yes  [] No    Treatment/Activity Tolerance:  [x] Patient able to complete treatment  [] Patient limited by fatigue  [] Patient limited by pain     [] Patient limited by other medical complications  [x] Other: Pt exhibits impaired firing of quad, specifically the VMO, contributing to lateral tracking patella and continued catching feeling and residual pain and soreness post op. PT and pt are focused on firing pattern of quad with NMES, taping, and specific exercise. Patient Education:  ITB rolling with foam roller or rolling pin    Access Code: [email or web address]  URL: [email or web address]  Date: 11/16/2022  Prepared by: Amos Howard    Program Notes  ITB rolling x3-5 min    Exercises  Long Sitting Quad Set - 1 x daily - 7 x weekly - 10 reps - 10 hold  Active Straight Leg Raise with Quad Set - 1 x daily - 7 x weekly - 3 sets - 10 reps - 10 sec first rep hold  Sidelying Hip Abduction - 1 x daily - 7 x weekly - 3 sets - 10 reps  Clamshell with Resistance - 1 x daily - 7 x weekly - 3 sets - 10 reps  Bridge with Upper Back on Swiss Ball - 1 x daily - 7 x weekly - 3 sets - 10 reps  Heel Raise on Step - 1 x daily - 7 x weekly - 3 sets - 10 reps  Standing Terminal Knee Extension with Resistance - 1 x daily - 7 x weekly - 3 sets - 10 reps - 5 sec hold  Wall Squat with Ball between Knees - 1 x daily - 7 x weekly - 1 sets - 10 reps - 10 sec hold  Single Leg Stance on Foam Pad - 1 x daily - 7 x weekly - 1 sets - 3 reps - 30 sec hold  Seated Long Arc Quad - 1 x daily - 7 x weekly - 2 sets - 10 reps  Prone Quadriceps Stretch - 1 x daily - 7 x weekly - 1 sets - 2 reps - 30 sec hold  Seated Hamstring Stretch - 1 x daily - 7 x weekly - 1 sets - 3 reps - 30 sec hold  Standing Gastroc Stretch on Step - 1 x daily - 7 x weekly - 1 sets - 3 reps - 30 sec hold     PLAN: See eval  [x] Continue per plan of care [] Alter current plan (see comments above)  [] Plan of care initiated [] Hold pending MD visit [] Discharge      Electronically signed by:  Tasha Hercules, PT    Note: If patient does not return for scheduled/ recommended follow up visits, this note will serve as a discharge from care along with most recent update on progress.

## 2022-11-28 ENCOUNTER — HOSPITAL ENCOUNTER (OUTPATIENT)
Dept: PHYSICAL THERAPY | Age: 52
Setting detail: THERAPIES SERIES
Discharge: HOME | End: 2022-11-28
Payer: COMMERCIAL

## 2022-11-28 PROCEDURE — 97016 VASOPNEUMATIC DEVICE THERAPY: CPT

## 2022-11-28 PROCEDURE — 97110 THERAPEUTIC EXERCISES: CPT

## 2022-11-28 PROCEDURE — 97112 NEUROMUSCULAR REEDUCATION: CPT

## 2022-11-28 NOTE — FLOWSHEET NOTE
The [address] [place] [address],[address], 800 [facility] [address] [facility], [address] [place] [address]  Phone: [phone]   Fax:     [phone]      Physical Therapy Daily Treatment Note  Date:  2022    Patient Name:  Guille Tinsley   \"JT\" :  1970  MRN: [id number]  Restrictions/Precautions:    Medical/Treatment Diagnosis Information:  Diagnosis: Primary osteoarthritis of left knee (M17.12)  Treatment Diagnosis: L knee pain F61.447  Insurance/Certification information:  PT Insurance Information: [facility]  Physician Information:   Richardmatteo Stewartesperanza  Has the plan of care been signed (Y/N):        []  Yes  [x]  No     Date of Patient follow up with Physician: per md, mid Oct (not scheduled yet)      Is this a Progress Report:     []  Yes  [x]  No        If Yes:  Date Range for reporting period:  Beginning   Ending     Progress report will be due (10 Rx or 30 days whichever is less): 50/40       Recertification will be due (POC Duration  / 90 days whichever is less):          Visit # Insurance Allowable Auth Required   22 60 []  Yes []  No        Functional Scale: FOTO: 55/100    Date assessed:  10/19       Latex Allergy:  [x]NO      []YES  Preferred Language for Healthcare:   [x]English       []other:    Pain level:  1/10     SUBJECTIVE:  Pt has not had tape on knee since Thursday due to skin irritation. Pt reports he has done electro stim and stretching every day but didn't do ex for a few days. He did do them yesterday. Pt notes walking down an incline is the same feeling as walking down steps, uncomfortable. He continues to experience catching at times and cracking that can be painful. Pt notes ex feel different without the tape but is able to do them.  Pt feels a tightness in anterior knee with quad set/SLR>     DOS: 9/15/22    OBJECTIVE:    ROM 0-135 °     Observation: portal sites healing well; pt amb with no crutches;  minimal skin irritation still noted, small area of broken skin (will hold on tape today)  11/28: Trial with Breg lateral stabilization brace today. There was a bit of an adjustment period in the clinic however patella seemed to [facility] in place\" and brace felt good.  Pt to take home with him and continue trial    RESTRICTIONS/PRECAUTIONS: acitivty moderation     Exercises/Interventions: HEP updated 11/16  Exercise/Equipment Resistance/Repetitions Other comments   Stretching     Hamstring [id number]    Towel Pull gastroc [id number] slant   Inclined Calf     Hip Flexion     ITB     Groin                       SLR     Supine 3x10 (10\" hold first rep) 3# ^ 11/23   Abduction 3x10 3# ^ 11/23   Adduction     Prone     SLR+     Clamshell + ABD iso 3x10  ^ 11/23, GTB   LAQ 3x10 ^11/23, 3#, slow control    Isometrics     Quad sets with NMES (symmtric biphasic) 10\" hold x10 min  3rd NMES        Patellar Glides     Medial     Superior     Inferior          ROM     Sheet Pulls [id number] This ex 1st , 0-135 ° 22/38, discussed to re-introduce this ex prior to re-taping at home (11/21)   Hang Weights     Passive     Active     Weight Shift                       CKC     Calf raises 3x10 on slantboard Added 9/19   Wall sits + add iso 10\"x10 Changed 10/31   1 leg stand 30\"x3 on BOSU, alt R/L ^11/23   Squatting     Single leg squat 2x10 67 cm   CC TKE 5\"x30 ^ 11/2 CC level 6   Bridges + ADD iso on SB 10\"x10  ^ 11/16   Sidestepping 3x15' blackTB, added 11/14    held this visit (11/16) 4\", added 11/9   PRE     Extension  RANGE:   Flexion  RANGE:        Quantum machines     Leg press  3x10 #, ^11/23, cues for maintaining knee alignment, challenge noted   Leg extension     Leg curl          Manual interventions                     Therapeutic Exercise and NMR EXR  [x] (73101) Provided verbal/tactile cueing for activities related to strengthening, flexibility, endurance, ROM for improvements in LE, proximal hip, and core control with self care, mobility, lifting, ambulation.  [] (97638) Provided verbal/tactile cueing for activities related to improving balance, coordination, kinesthetic sense, posture, motor skill, proprioception  to assist with LE, proximal hip, and core control in self care, mobility, lifting, ambulation and eccentric single leg control. NMR and Therapeutic Activities:    [] (72204 or 95005) Provided verbal/tactile cueing for activities related to improving balance, coordination, kinesthetic sense, posture, motor skill, proprioception and motor activation to allow for proper function of core, proximal hip and LE with self care and ADLs  [] (92701) Gait Re-education- Provided training and instruction to the patient for proper LE, core and proximal hip recruitment and positioning and eccentric body weight control with ambulation re-education including up and down stairs     Home Exercise Program:    [x] (94606) Reviewed/Progressed HEP activities related to strengthening, flexibility, endurance, ROM of core, proximal hip and LE for functional self-care, mobility, lifting and ambulation/stair navigation   [] (92359)Reviewed/Progressed HEP activities related to improving balance, coordination, kinesthetic sense, posture, motor skill, proprioception of core, proximal hip and LE for self care, mobility, lifting, and ambulation/stair navigation      Manual Treatments:  PROM / STM / Oscillations-Mobs:  G-I, II, III, IV (PA's, Inf., Post.)  [x] (82190) Provided manual therapy to mobilize LE, proximal hip and/or LS spine soft tissue/joints for the purpose of modulating pain, promoting relaxation,  increasing ROM, reducing/eliminating soft tissue swelling/inflammation/restriction, improving soft tissue extensibility and allowing for proper ROM for normal function with self care, mobility, lifting and ambulation.      Modalities:  vaso 15    Charges:  Timed Code Treatment Minutes: 50   Total Treatment Minutes: 65       [] EVAL (LOW) 73915 (typically 20 minutes face-to-face)  [] EVAL (MOD) 52558 (typically 30 minutes face-to-face)  [] EVAL (HIGH) 96895 (typically 45 minutes face-to-face)  [] RE-EVAL   [x] HH(94172) x  2   [] IONTO  [x] NMR (49910) x  1   [x] VASO  [] Manual (33273) x      [] Other:  [] TA x      [] Mech Traction (80489)  [] ES(attended) (31616)      [] ES (un) (49500):     GOALS:   Patient stated goal: return to community activities     [x] Progressing: [] Met: [] Not Met: [] Adjusted     Therapist goals for Patient:   Short Term Goals: To be achieved in: 2-4 weeks  1. Independent in HEP and progression per patient tolerance, in order to prevent re-injury. [x] Progressing: [] Met: [] Not Met: [] Adjusted   2. Patient will have a decrease in pain to facilitate improvement in movement, function, and ADLs as indicated by Functional Deficits. [x] Progressing: [] Met: [] Not Met: [] Adjusted     Long Term Goals: To be achieved in: 12 weeks  1. Foto68/100to assist with reaching prior level of function. [x] Progressing: [] Met: [] Not Met: [] Adjusted  2. Patient will demonstrate increased AROM to =R to allow for proper joint functioning as indicated by patients Functional Deficits. [x] Progressing: [] Met: [] Not Met: [] Adjusted  3. Patient will demonstrate an increase in Strength to good proximal hip strength and control  in LE to allow for proper functional mobility as indicated by patients Functional Deficits. [x] Progressing: [] Met: [] Not Met: [] Adjusted  4. Patient will return to normal heel to toe gait in community  ascend descend 12 steps alternating  functional activities without increased symptoms or restriction. [x] Progressing: [] Met: [] Not Met: [] Adjusted  Overall Progression Towards Functional goals/ Treatment Progress Update:  [] Patient is progressing as expected towards functional goals listed. [x] Progression is slowed due to complexities/Impairments listed. [] Progression has been slowed due to co-morbidities.   [] Plan just implemented, too soon to assess goals progression <30days   [] Goals require adjustment due to lack of progress  [] Patient is not progressing as expected and requires additional follow up with physician  [] Other    Prognosis for POC: [x] Good [] Fair  [] Poor      Patient requires continued skilled intervention: [x] Yes  [] No    Treatment/Activity Tolerance:  [x] Patient able to complete treatment  [] Patient limited by fatigue  [] Patient limited by pain     [] Patient limited by other medical complications  [x] Other: Pt exhibits impaired firing of quad, specifically the VMO, contributing to lateral tracking patella and continued catching feeling and residual pain and soreness post op. PT and pt are focused on firing pattern of quad with NMES, taping, and specific exercise. Patient Education:  ITB rolling with foam roller or rolling pin    Access Code: [email or web address]  URL: [email or web address]  Date: 11/16/2022  Prepared by: Shannon Becerra    Program Notes  ITB rolling x3-5 min    Exercises  Long Sitting Quad Set - 1 x daily - 7 x weekly - 10 reps - 10 hold  Active Straight Leg Raise with Quad Set - 1 x daily - 7 x weekly - 3 sets - 10 reps - 10 sec first rep hold  Sidelying Hip Abduction - 1 x daily - 7 x weekly - 3 sets - 10 reps  Clamshell with Resistance - 1 x daily - 7 x weekly - 3 sets - 10 reps  Bridge with Upper Back on Swiss Ball - 1 x daily - 7 x weekly - 3 sets - 10 reps  Heel Raise on Step - 1 x daily - 7 x weekly - 3 sets - 10 reps  Standing Terminal Knee Extension with Resistance - 1 x daily - 7 x weekly - 3 sets - 10 reps - 5 sec hold  Wall Squat with Ball between Knees - 1 x daily - 7 x weekly - 1 sets - 10 reps - 10 sec hold  Single Leg Stance on Foam Pad - 1 x daily - 7 x weekly - 1 sets - 3 reps - 30 sec hold  Seated Long Arc Quad - 1 x daily - 7 x weekly - 2 sets - 10 reps  Prone Quadriceps Stretch - 1 x daily - 7 x weekly - 1 sets - 2 reps - 30 sec hold  Seated Hamstring Stretch - 1 x daily - 7 x weekly - 1 sets - 3 reps - 30 sec hold  Standing Gastroc Stretch on Step - 1 x daily - 7 x weekly - 1 sets - 3 reps - 30 sec hold     PLAN: See eval  [x] Continue per plan of care [] Alter current plan (see comments above)  [] Plan of care initiated [] Hold pending MD visit [] Discharge      Electronically signed by:  Harmony Irwin PT    Note: If patient does not return for scheduled/ recommended follow up visits, this note will serve as a discharge from care along with most recent update on progress.

## 2022-11-30 ENCOUNTER — HOSPITAL ENCOUNTER (OUTPATIENT)
Dept: PHYSICAL THERAPY | Age: 52
Setting detail: THERAPIES SERIES
Discharge: HOME | End: 2022-11-30
Payer: COMMERCIAL

## 2022-11-30 NOTE — FLOWSHEET NOTE
Physical Therapy  Cancellation/No-show Note  Patient Name:  Minnie Pérez  :  1970   Date:  2022  Cancelled visits to date: 1  No-shows to date: 0    For today's appointment patient:  [x]  Cancelled  []  Rescheduled appointment  []  No-show     Reason given by patient:  [x]  Patient ill  []  Conflicting appointment  []  No transportation    []  Conflict with work  []  No reason given  []  Other:     Comments:      Electronically signed by:  Du Sung PT, PT

## 2022-12-05 ENCOUNTER — HOSPITAL ENCOUNTER (OUTPATIENT)
Dept: PHYSICAL THERAPY | Age: 52
Setting detail: THERAPIES SERIES
Discharge: HOME | End: 2022-12-05
Payer: COMMERCIAL

## 2022-12-05 PROCEDURE — 97110 THERAPEUTIC EXERCISES: CPT

## 2022-12-05 PROCEDURE — 97016 VASOPNEUMATIC DEVICE THERAPY: CPT

## 2022-12-05 PROCEDURE — 97140 MANUAL THERAPY 1/> REGIONS: CPT

## 2022-12-05 NOTE — FLOWSHEET NOTE
The [address] [place], [place] [address], [address], [address] [place] [address]  Phone: [phone]   Fax:     [phone]      Physical Therapy Daily Treatment Note  Date:  2022    Patient Name:  Adwoa Rodriguez   \"JT\" :  1970  MRN: [id number]  Restrictions/Precautions:    Medical/Treatment Diagnosis Information:  Diagnosis: Primary osteoarthritis of left knee (M17.12)  Treatment Diagnosis: L knee pain X76.978  Insurance/Certification information:  PT Insurance Information: [facility]  Physician Information:   Becky Burnham  Has the plan of care been signed (Y/N):        []  Yes  [x]  No     Date of Patient follow up with Physician: per md, mid Oct (not scheduled yet)      Is this a Progress Report:     []  Yes  [x]  No        If Yes:  Date Range for reporting period:  Beginning   Ending     Progress report will be due (10 Rx or 30 days whichever is less):        Recertification will be due (POC Duration  / 90 days whichever is less):          Visit # Insurance Allowable Auth Required   23 60 []  Yes []  No        Functional Scale: FOTO: 55/100    Date assessed:  10/19       Latex Allergy:  [x]NO      []YES  Preferred Language for Healthcare:   [x]English       []other:    Pain level:  0/10     SUBJECTIVE:  Pt has been ill with the flu and really unable to do much exercise since last visit. Pt has worn the brace and feels like it does help, maybe not as good as the tape, but is a good option if skin is bothered by tape. DOS: 9/15/22    OBJECTIVE:    ROM 0-135 °     Observation: portal sites healing well; pt amb with no crutches;  minimal skin irritation still noted, small area of broken skin (will hold on tape today)  : Trial with Breg lateral stabilization brace today. There was a bit of an adjustment period in the clinic however patella seemed to [facility] in place\" and brace felt good.  Pt to take home with him and continue trial    RESTRICTIONS/PRECAUTIONS: acitivty moderation     Exercises/Interventions: HEP updated 11/16  Exercise/Equipment Resistance/Repetitions Other comments   Stretching     Hamstring [id number]    Towel Pull gastroc [id number] slant   Inclined Calf     Hip Flexion     ITB     Groin                       SLR     Supine 3x10 (10\" hold first rep) 3# ^ 11/23   Abduction 3x10 3# ^ 11/23   Adduction     Prone     SLR+     Clamshell + ABD iso 3x10  ^ 11/23, GTB   LAQ 3x10 ^11/23, 3#, slow control    Isometrics     Quad sets with NMES (symmtric biphasic) 10\" hold x10 min 2nd TAPE for lateral tracking patella 3rd NMES        Patellar Glides     Medial     Superior     Inferior          ROM     Sheet Pulls [id number] This ex 1st , 0-135 ° 22/38, discussed to re-introduce this ex prior to re-taping at home (11/21)   Hang Weights     Passive     Active     Weight Shift                       CKC     Calf raises 3x10 on slantboard Added 9/19   Wall sits + add iso 10\"x10 Changed 10/31   1 leg stand 30\"x3 on BOSU (black side), alt R/L ^11/23   Squatting     Single leg squat 3x10 67 cm   CC TKE 5\"x30 ^ 11/2 CC level 6   Bridges + ADD iso on SB 5\"x20  ^ 11/16   Sidestepping 3x15' Grey loop, added 11/14    held this visit (11/16) 4\", added 11/9   PRE     Extension  RANGE:   Flexion  RANGE:        Quantum machines     Leg press  3x10 #, ^12/5, cues for maintaining knee alignment, challenge noted   Leg extension     Leg curl          Manual interventions                     Therapeutic Exercise and NMR EXR  [x] (62051) Provided verbal/tactile cueing for activities related to strengthening, flexibility, endurance, ROM for improvements in LE, proximal hip, and core control with self care, mobility, lifting, ambulation.  [] (01418) Provided verbal/tactile cueing for activities related to improving balance, coordination, kinesthetic sense, posture, motor skill, proprioception  to assist with LE, proximal hip, and core control in self care, mobility, lifting, ambulation and eccentric single leg control. NMR and Therapeutic Activities:    [] (25209 or 84375) Provided verbal/tactile cueing for activities related to improving balance, coordination, kinesthetic sense, posture, motor skill, proprioception and motor activation to allow for proper function of core, proximal hip and LE with self care and ADLs  [] (47767) Gait Re-education- Provided training and instruction to the patient for proper LE, core and proximal hip recruitment and positioning and eccentric body weight control with ambulation re-education including up and down stairs     Home Exercise Program:    [x] (59653) Reviewed/Progressed HEP activities related to strengthening, flexibility, endurance, ROM of core, proximal hip and LE for functional self-care, mobility, lifting and ambulation/stair navigation   [] (26867)Reviewed/Progressed HEP activities related to improving balance, coordination, kinesthetic sense, posture, motor skill, proprioception of core, proximal hip and LE for self care, mobility, lifting, and ambulation/stair navigation      Manual Treatments:  PROM / STM / Oscillations-Mobs:  G-I, II, III, IV (PA's, Inf., Post.)  [x] (60863) Provided manual therapy to mobilize LE, proximal hip and/or LS spine soft tissue/joints for the purpose of modulating pain, promoting relaxation,  increasing ROM, reducing/eliminating soft tissue swelling/inflammation/restriction, improving soft tissue extensibility and allowing for proper ROM for normal function with self care, mobility, lifting and ambulation.      Modalities:  vaso 15    Charges:  Timed Code Treatment Minutes: 50   Total Treatment Minutes: 65       [] EVAL (LOW) 29234 (typically 20 minutes face-to-face)  [] EVAL (MOD) 79831 (typically 30 minutes face-to-face)  [] EVAL (HIGH) 65644 (typically 45 minutes face-to-face)  [] RE-EVAL   [x] DR(18591) x  2   [] IONTO  [x] NMR (57197) x  1   [x] VASO  [] Manual (78532) x [] Other:  [] TA x      [] [facility] (07297)  [] ES(attended) (49803)      [] ES (un) (80916):     GOALS:   Patient stated goal: return to community activities     [x] Progressing: [] Met: [] Not Met: [] Adjusted     Therapist goals for Patient:   Short Term Goals: To be achieved in: 2-4 weeks  1. Independent in HEP and progression per patient tolerance, in order to prevent re-injury. [x] Progressing: [] Met: [] Not Met: [] Adjusted   2. Patient will have a decrease in pain to facilitate improvement in movement, function, and ADLs as indicated by Functional Deficits. [x] Progressing: [] Met: [] Not Met: [] Adjusted     Long Term Goals: To be achieved in: 12 weeks  1. Foto68/100to assist with reaching prior level of function. [x] Progressing: [] Met: [] Not Met: [] Adjusted  2. Patient will demonstrate increased AROM to =R to allow for proper joint functioning as indicated by patients Functional Deficits. [x] Progressing: [] Met: [] Not Met: [] Adjusted  3. Patient will demonstrate an increase in Strength to good proximal hip strength and control  in LE to allow for proper functional mobility as indicated by patients Functional Deficits. [x] Progressing: [] Met: [] Not Met: [] Adjusted  4. Patient will return to normal heel to toe gait in community  ascend descend 12 steps alternating  functional activities without increased symptoms or restriction. [x] Progressing: [] Met: [] Not Met: [] Adjusted  Overall Progression Towards Functional goals/ Treatment Progress Update:  [] Patient is progressing as expected towards functional goals listed. [x] Progression is slowed due to complexities/Impairments listed. [] Progression has been slowed due to co-morbidities.   [] Plan just implemented, too soon to assess goals progression <30days   [] Goals require adjustment due to lack of progress  [] Patient is not progressing as expected and requires additional follow up with physician  [] Other    Prognosis for POC: [x] Good [] Fair  [] Poor      Patient requires continued skilled intervention: [x] Yes  [] No    Treatment/Activity Tolerance:  [x] Patient able to complete treatment  [] Patient limited by fatigue  [] Patient limited by pain     [] Patient limited by other medical complications  [x] Other: Pt exhibits impaired firing of quad, specifically the VMO, contributing to lateral tracking patella and continued catching feeling and residual pain and soreness post op. PT and pt are focused on firing pattern of quad with NMES, taping, and specific exercise. Patient Education:  ITB rolling with foam roller or rolling pin    Access Code: [email or web address]  URL: [employer]/  Date: 11/16/2022  Prepared by: Nam Greco    Program Notes  ITB rolling x3-5 min    Exercises  Long Sitting Quad Set - 1 x daily - 7 x weekly - 10 reps - 10 hold  Active Straight Leg Raise with Quad Set - 1 x daily - 7 x weekly - 3 sets - 10 reps - 10 sec first rep hold  Sidelying Hip Abduction - 1 x daily - 7 x weekly - 3 sets - 10 reps  Clamshell with Resistance - 1 x daily - 7 x weekly - 3 sets - 10 reps  Bridge with Upper Back on Swiss Ball - 1 x daily - 7 x weekly - 3 sets - 10 reps  Heel Raise on Step - 1 x daily - 7 x weekly - 3 sets - 10 reps  Standing Terminal Knee Extension with Resistance - 1 x daily - 7 x weekly - 3 sets - 10 reps - 5 sec hold  Wall Squat with Ball between Knees - 1 x daily - 7 x weekly - 1 sets - 10 reps - 10 sec hold  Single Leg Stance on Foam Pad - 1 x daily - 7 x weekly - 1 sets - 3 reps - 30 sec hold  Seated Long Arc Quad - 1 x daily - 7 x weekly - 2 sets - 10 reps  Prone Quadriceps Stretch - 1 x daily - 7 x weekly - 1 sets - 2 reps - 30 sec hold  Seated Hamstring Stretch - 1 x daily - 7 x weekly - 1 sets - 3 reps - 30 sec hold  Standing Gastroc Stretch on Step - 1 x daily - 7 x weekly - 1 sets - 3 reps - 30 sec hold     PLAN: See eval  [x] Continue per plan of care [] Alter current plan (see comments above)  [] Plan of care initiated [] Hold pending MD visit [] Discharge      Electronically signed by:  Tasha Hercules PT    Note: If patient does not return for scheduled/ recommended follow up visits, this note will serve as a discharge from care along with most recent update on progress.

## 2022-12-07 ENCOUNTER — HOSPITAL ENCOUNTER (OUTPATIENT)
Dept: PHYSICAL THERAPY | Age: 52
Setting detail: THERAPIES SERIES
Discharge: HOME | End: 2022-12-07
Payer: COMMERCIAL

## 2022-12-07 PROCEDURE — 97112 NEUROMUSCULAR REEDUCATION: CPT

## 2022-12-07 PROCEDURE — 97110 THERAPEUTIC EXERCISES: CPT

## 2022-12-07 PROCEDURE — 97016 VASOPNEUMATIC DEVICE THERAPY: CPT

## 2022-12-07 NOTE — FLOWSHEET NOTE
[facility] [address]  Phone: [phone]   Fax:     [phone]      Physical Therapy Daily Treatment Note  Date:  2022    Patient Name:  Darlyn Gibson   \"JT\" :  1970  MRN: [id number]  Restrictions/Precautions:    Medical/Treatment Diagnosis Information:  Diagnosis: Primary osteoarthritis of left knee (M17.12)  Treatment Diagnosis: L knee pain G18.829  Insurance/Certification information:  PT Insurance Information: [facility]  Physician Information:   Doug Tejeda  Has the plan of care been signed (Y/N):        []  Yes  [x]  No     Date of Patient follow up with Physician: per md, mid Oct (not scheduled yet)      Is this a Progress Report:     []  Yes  [x]  No        If Yes:  Date Range for reporting period:  Beginning   Ending     Progress report will be due (10 Rx or 30 days whichever is less):        Recertification will be due (POC Duration  / 90 days whichever is less):          Visit # Insurance Allowable Auth Required   23 60 []  Yes []  No        Functional Scale: FOTO: 55/100    Date assessed:  10/19       Latex Allergy:  [x]NO      []YES  Preferred Language for Healthcare:   [x]English       []other:    Pain level:  0/10     SUBJECTIVE:  Pt reports his knee feels good. He decided not to take off cover roll and was able to take off leuko tape. DOS: 9/15/22    OBJECTIVE:    ROM 0-135 °     Observation: portal sites healing well; pt amb with no crutches;  minimal skin irritation still noted, small area of broken skin (will hold on tape today)  : Trial with Breg lateral stabilization brace today. There was a bit of an adjustment period in the clinic however patella seemed to [facility] in place\" and brace felt good.  Pt to take home with him and continue trial    RESTRICTIONS/PRECAUTIONS: acitivty moderation     Exercises/Interventions: HEP updated 11/16  Exercise/Equipment Resistance/Repetitions Other comments   Stretching     Hamstring [id number]    Towel Pull gastroc [id number] slant   Inclined Calf     Hip Flexion     ITB     Groin                       SLR     Supine 3x10 (15\" hold first rep) 3# ^ 11/23   Abduction 3x10 3# ^ 11/23   Adduction     Prone     SLR+     Clamshell + ABD iso 3x10  ^ 11/23, BlackTB   ^11/23, 3#, slow control    Isometrics     Quad sets with NMES (symmtric biphasic) 10\" hold x10 min 2nd TAPE for lateral tracking patella, 3rd NMES        Patellar Glides     Medial     Superior     Inferior          ROM     Sheet Pulls [id number] This ex 1st , 0-135 ° 22/38, discussed to re-introduce this ex prior to re-taping at home (11/21)   Hang Weights     Passive     Active     Weight Shift                       CKC     Calf raises 3x10 on slantboard Added 9/19   Wall sits + add iso 10\"x10 Changed 10/31   1 leg stand 30\"x3 on BOSU (black side), alt R/L ^11/23   Squatting     Single leg squat 3x10 67 cm   CC TKE 5\"x30 ^ 11/2 CC level 6   Bridges + ADD iso on SB 5\"x20  ^ 11/16   Sidestepping 3x15' Grey loop, added 11/14   LSD  2x10 + 5  4\", added 11/9   PRE     Extension  RANGE:   Flexion  RANGE:        Quantum machines     Leg press  3x10 #, ^12/5, cues for maintaining knee alignment, challenge noted   Leg extension     Leg curl          Manual interventions                     Therapeutic Exercise and NMR EXR  [x] (69010) Provided verbal/tactile cueing for activities related to strengthening, flexibility, endurance, ROM for improvements in LE, proximal hip, and core control with self care, mobility, lifting, ambulation.  [] (53957) Provided verbal/tactile cueing for activities related to improving balance, coordination, kinesthetic sense, posture, motor skill, proprioception  to assist with LE, proximal hip, and core control in self care, mobility, lifting, ambulation and eccentric single leg control.      NMR and Therapeutic Activities:    [] (44511 or ) Provided verbal/tactile cueing for activities related to improving balance, coordination, kinesthetic sense, posture, motor skill, proprioception and motor activation to allow for proper function of core, proximal hip and LE with self care and ADLs  [] (45687) Gait Re-education- Provided training and instruction to the patient for proper LE, core and proximal hip recruitment and positioning and eccentric body weight control with ambulation re-education including up and down stairs     Home Exercise Program:    [x] (94967) Reviewed/Progressed HEP activities related to strengthening, flexibility, endurance, ROM of core, proximal hip and LE for functional self-care, mobility, lifting and ambulation/stair navigation   [] (62941)Reviewed/Progressed HEP activities related to improving balance, coordination, kinesthetic sense, posture, motor skill, proprioception of core, proximal hip and LE for self care, mobility, lifting, and ambulation/stair navigation      Manual Treatments:  PROM / STM / Oscillations-Mobs:  G-I, II, III, IV (PA's, Inf., Post.)  [x] (19357) Provided manual therapy to mobilize LE, proximal hip and/or LS spine soft tissue/joints for the purpose of modulating pain, promoting relaxation,  increasing ROM, reducing/eliminating soft tissue swelling/inflammation/restriction, improving soft tissue extensibility and allowing for proper ROM for normal function with self care, mobility, lifting and ambulation.      Modalities:  vaso 15    Charges:  Timed Code Treatment Minutes: 50   Total Treatment Minutes: 65       [] EVAL (LOW) 93502 (typically 20 minutes face-to-face)  [] EVAL (MOD) 64762 (typically 30 minutes face-to-face)  [] EVAL (HIGH) 17696 (typically 45 minutes face-to-face)  [] RE-EVAL   [x] [place](39830) x  2   [] IONTO  [x] NMR (59683) x  1   [x] VASO  [] Manual (63135) x      [] Other:  [] TA x      [] Mech Traction (30703)  [] ES(attended) (72299)      [] ES (un) (00159):     GOALS: Patient stated goal: return to community activities     [x] Progressing: [] Met: [] Not Met: [] Adjusted     Therapist goals for Patient:   Short Term Goals: To be achieved in: 2-4 weeks  1. Independent in HEP and progression per patient tolerance, in order to prevent re-injury. [x] Progressing: [] Met: [] Not Met: [] Adjusted   2. Patient will have a decrease in pain to facilitate improvement in movement, function, and ADLs as indicated by Functional Deficits. [x] Progressing: [] Met: [] Not Met: [] Adjusted     Long Term Goals: To be achieved in: 12 weeks  1. Foto68/100to assist with reaching prior level of function. [x] Progressing: [] Met: [] Not Met: [] Adjusted  2. Patient will demonstrate increased AROM to =R to allow for proper joint functioning as indicated by patients Functional Deficits. [x] Progressing: [] Met: [] Not Met: [] Adjusted  3. Patient will demonstrate an increase in Strength to good proximal hip strength and control  in LE to allow for proper functional mobility as indicated by patients Functional Deficits. [x] Progressing: [] Met: [] Not Met: [] Adjusted  4. Patient will return to normal heel to toe gait in community  ascend descend 12 steps alternating  functional activities without increased symptoms or restriction. [x] Progressing: [] Met: [] Not Met: [] Adjusted  Overall Progression Towards Functional goals/ Treatment Progress Update:  [] Patient is progressing as expected towards functional goals listed. [x] Progression is slowed due to complexities/Impairments listed. [] Progression has been slowed due to co-morbidities.   [] Plan just implemented, too soon to assess goals progression <30days   [] Goals require adjustment due to lack of progress  [] Patient is not progressing as expected and requires additional follow up with physician  [] Other    Prognosis for POC: [x] Good [] Fair  [] Poor      Patient requires continued skilled intervention: [x] Yes  [] No    Treatment/Activity Tolerance:  [x] Patient able to complete treatment  [] Patient limited by fatigue  [] Patient limited by pain     [] Patient limited by other medical complications  [x] Other: Pt exhibits impaired firing of quad, specifically the VMO, contributing to lateral tracking patella and continued catching feeling and residual pain and soreness post op. PT and pt are focused on firing pattern of quad with NMES, taping, and specific exercise. Patient Education:  ITB rolling with foam roller or rolling pin    Access Code: [email or web address]  URL: [email or web address]  Date: 11/16/2022  Prepared by: Kailey Troy    Program Notes  ITB rolling x3-5 min    Exercises  Long Sitting Quad Set - 1 x daily - 7 x weekly - 10 reps - 10 hold  Active Straight Leg Raise with Quad Set - 1 x daily - 7 x weekly - 3 sets - 10 reps - 10 sec first rep hold  Sidelying Hip Abduction - 1 x daily - 7 x weekly - 3 sets - 10 reps  Clamshell with Resistance - 1 x daily - 7 x weekly - 3 sets - 10 reps  Bridge with Upper Back on Swiss Ball - 1 x daily - 7 x weekly - 3 sets - 10 reps  Heel Raise on Step - 1 x daily - 7 x weekly - 3 sets - 10 reps  Standing Terminal Knee Extension with Resistance - 1 x daily - 7 x weekly - 3 sets - 10 reps - 5 sec hold  Wall Squat with Ball between Knees - 1 x daily - 7 x weekly - 1 sets - 10 reps - 10 sec hold  Single Leg Stance on Foam Pad - 1 x daily - 7 x weekly - 1 sets - 3 reps - 30 sec hold  Seated Long Arc Quad - 1 x daily - 7 x weekly - 2 sets - 10 reps  Prone Quadriceps Stretch - 1 x daily - 7 x weekly - 1 sets - 2 reps - 30 sec hold  Seated Hamstring Stretch - 1 x daily - 7 x weekly - 1 sets - 3 reps - 30 sec hold  Standing Gastroc Stretch on Step - 1 x daily - 7 x weekly - 1 sets - 3 reps - 30 sec hold     PLAN: See eval  [x] Continue per plan of care [] Alter current plan (see comments above)  [] Plan of care initiated [] Hold pending MD visit [] Discharge      Electronically signed by:  Liudmila Barron PT    Note: If patient does not return for scheduled/ recommended follow up visits, this note will serve as a discharge from care along with most recent update on progress.

## 2022-12-12 ENCOUNTER — APPOINTMENT (OUTPATIENT)
Dept: PHYSICAL THERAPY | Age: 52
End: 2022-12-12
Payer: COMMERCIAL

## 2022-12-14 ENCOUNTER — HOSPITAL ENCOUNTER (OUTPATIENT)
Dept: PHYSICAL THERAPY | Age: 52
Setting detail: THERAPIES SERIES
Discharge: HOME | End: 2022-12-14
Payer: COMMERCIAL

## 2022-12-14 PROCEDURE — 97016 VASOPNEUMATIC DEVICE THERAPY: CPT

## 2022-12-14 PROCEDURE — 97110 THERAPEUTIC EXERCISES: CPT

## 2022-12-14 PROCEDURE — 97140 MANUAL THERAPY 1/> REGIONS: CPT

## 2022-12-14 NOTE — FLOWSHEET NOTE
The [address] [place] [address],[address], [address] [facility] [address], [address] [place] [address]  Phone: [phone]   Fax:     [phone]      Physical Therapy Daily Treatment Note  Date:  2022    Patient Name:  Alec Perez   \"JT\" :  1970  MRN: [id number]  Restrictions/Precautions:    Medical/Treatment Diagnosis Information:  Diagnosis: Primary osteoarthritis of left knee (M17.12)  Treatment Diagnosis: L knee pain Y18.340  Insurance/Certification information:  PT Insurance Information: [facility]  Physician Information:   Serina Ayala  Has the plan of care been signed (Y/N):        []  Yes  [x]  No     Date of Patient follow up with Physician: per md, mid Oct (not scheduled yet)      Is this a Progress Report:     []  Yes  [x]  No        If Yes:  Date Range for reporting period:  Beginning   Ending     Progress report will be due (10 Rx or 30 days whichever is less):        Recertification will be due (POC Duration  / 90 days whichever is less):          Visit # Insurance Allowable Auth Required   23 60 []  Yes []  No        Functional Scale: FOTO: 55/100    Date assessed:  10/19       Latex Allergy:  [x]NO      []YES  Preferred Language for Healthcare:   [x]English       []other:    Pain level:  0/10     SUBJECTIVE:  Pt reports he continues to feel some catching without tape or brace. He also put the electrodes along path of medial quad and that has helped VMO fire better. DOS: 9/15/22    OBJECTIVE:    ROM 0-135 °     Observation: portal sites healing well; pt amb with no crutches;  minimal skin irritation still noted, small area of broken skin (will hold on tape today)  : Trial with Breg lateral stabilization brace today. There was a bit of an adjustment period in the clinic however patella seemed to ARTEMIO [facility] in place\" and brace felt good.  Pt to take home with him and continue trial    RESTRICTIONS/PRECAUTIONS: acitivty moderation Exercises/Interventions: HEP updated 11/16  Exercise/Equipment Resistance/Repetitions Other comments   Stretching     Hamstring [id number]    Towel Pull gastroc [id number] slant   Inclined Calf     Hip Flexion     ITB     Groin                       SLR     Supine 3x10 (15\" hold first rep) 3# ^ 11/23   Abduction 3x10 3# ^ 11/23   Adduction     Prone     SLR+     Clamshell + ABD iso 3x10  ^ 11/23, BlackTB   ^11/23, 3#, slow control    Isometrics     Quad sets with NMES (symmtric biphasic) 10\" hold x10 min 2nd TAPE for lateral tracking patella, 3rd NMES        Patellar Glides     Medial     Superior     Inferior          ROM     This ex 1st , 0-135 ° 22/38, discussed to re-introduce this ex prior to re-taping at home (11/21)   Hang Weights     Passive     Active     Weight Shift                       CKC     Calf raises 3x10 on slantboard Added 9/19   Wall sits + add iso 10\"x10 Changed 10/31   4\" (single step), up with L, down with L/ up with R, down with R, added 10/3; HOLD 12/14   1 leg stand 30\"x3 on BOSU (black side), alt R/L ^11/23   Squatting     Single leg squat 3x10 67 cm   CC TKE 5\"x30 ^ 11/2 CC level 6   Bridges + ADD iso on SB 5\"x20  ^ 11/16   Sidestepping 3x15' Grey loop, added 11/14   4\", added 11/9, HOLD 12/14   PRE     Extension  RANGE:   Flexion  RANGE:        Quantum machines     Leg press  3x10 #, ^12/5, cues for maintaining knee alignment, challenge noted   Leg extension     Leg curl          Manual interventions                     Therapeutic Exercise and NMR EXR  [x] (68772) Provided verbal/tactile cueing for activities related to strengthening, flexibility, endurance, ROM for improvements in LE, proximal hip, and core control with self care, mobility, lifting, ambulation.  [] (65476) Provided verbal/tactile cueing for activities related to improving balance, coordination, kinesthetic sense, posture, motor skill, proprioception  to assist with LE, proximal hip, and core control in self care, mobility, lifting, ambulation and eccentric single leg control. NMR and Therapeutic Activities:    [] (33082 or 91023) Provided verbal/tactile cueing for activities related to improving balance, coordination, kinesthetic sense, posture, motor skill, proprioception and motor activation to allow for proper function of core, proximal hip and LE with self care and ADLs  [] (02725) Gait Re-education- Provided training and instruction to the patient for proper LE, core and proximal hip recruitment and positioning and eccentric body weight control with ambulation re-education including up and down stairs     Home Exercise Program:    [x] (44416) Reviewed/Progressed HEP activities related to strengthening, flexibility, endurance, ROM of core, proximal hip and LE for functional self-care, mobility, lifting and ambulation/stair navigation   [] (14297)Reviewed/Progressed HEP activities related to improving balance, coordination, kinesthetic sense, posture, motor skill, proprioception of core, proximal hip and LE for self care, mobility, lifting, and ambulation/stair navigation      Manual Treatments:  PROM / STM / Oscillations-Mobs:  G-I, II, III, IV (PA's, Inf., Post.)  [x] (94507) Provided manual therapy to mobilize LE, proximal hip and/or LS spine soft tissue/joints for the purpose of modulating pain, promoting relaxation,  increasing ROM, reducing/eliminating soft tissue swelling/inflammation/restriction, improving soft tissue extensibility and allowing for proper ROM for normal function with self care, mobility, lifting and ambulation.      Modalities:  vaso 15    Charges:  Timed Code Treatment Minutes: 50   Total Treatment Minutes: 65       [] EVAL (LOW) 66335 (typically 20 minutes face-to-face)  [] EVAL (MOD) 12479 (typically 30 minutes face-to-face)  [] EVAL (HIGH) 99973 (typically 45 minutes face-to-face)  [] RE-EVAL   [x] KL(82432) x  2   [] IONTO  [x] NMR (09145) x  1   [x] VASO  [] Manual (74037) x      [] Other:  [] TA x      [] [facility] T[facility] (45817)  [] ES(attended) (34935)      [] ES (un) (16498):     GOALS:   Patient stated goal: return to community activities     [x] Progressing: [] Met: [] Not Met: [] Adjusted     Therapist goals for Patient:   Short Term Goals: To be achieved in: 2-4 weeks  1. Independent in HEP and progression per patient tolerance, in order to prevent re-injury. [x] Progressing: [] Met: [] Not Met: [] Adjusted   2. Patient will have a decrease in pain to facilitate improvement in movement, function, and ADLs as indicated by Functional Deficits. [x] Progressing: [] Met: [] Not Met: [] Adjusted     Long Term Goals: To be achieved in: 12 weeks  1. Foto68/100to assist with reaching prior level of function. [x] Progressing: [] Met: [] Not Met: [] Adjusted  2. Patient will demonstrate increased AROM to =R to allow for proper joint functioning as indicated by patients Functional Deficits. [x] Progressing: [] Met: [] Not Met: [] Adjusted  3. Patient will demonstrate an increase in Strength to good proximal hip strength and control  in LE to allow for proper functional mobility as indicated by patients Functional Deficits. [x] Progressing: [] Met: [] Not Met: [] Adjusted  4. Patient will return to normal heel to toe gait in community  ascend descend 12 steps alternating  functional activities without increased symptoms or restriction. [x] Progressing: [] Met: [] Not Met: [] Adjusted  Overall Progression Towards Functional goals/ Treatment Progress Update:  [] Patient is progressing as expected towards functional goals listed. [x] Progression is slowed due to complexities/Impairments listed. [] Progression has been slowed due to co-morbidities.   [] Plan just implemented, too soon to assess goals progression <30days   [] Goals require adjustment due to lack of progress  [] Patient is not progressing as expected and requires additional follow up with physician  [] Other    Prognosis for POC: [x] Good [] Fair  [] Poor      Patient requires continued skilled intervention: [x] Yes  [] No    Treatment/Activity Tolerance:  [x] Patient able to complete treatment  [] Patient limited by fatigue  [] Patient limited by pain     [] Patient limited by other medical complications  [x] Other: Pt exhibits impaired firing of quad, specifically the VMO, contributing to lateral tracking patella and continued catching feeling and residual pain and soreness post op. PT and pt are focused on firing pattern of quad with NMES, taping, and specific exercise. Patient Education:  ITB rolling with foam roller or rolling pin    Access Code: [email or web address]  URL: [employer]/  Date: 11/16/2022  Prepared by: Lacey Hines    Program Notes  ITB rolling x3-5 min    Exercises  Long Sitting Quad Set - 1 x daily - 7 x weekly - 10 reps - 10 hold  Active Straight Leg Raise with Quad Set - 1 x daily - 7 x weekly - 3 sets - 10 reps - 10 sec first rep hold  Sidelying Hip Abduction - 1 x daily - 7 x weekly - 3 sets - 10 reps  Clamshell with Resistance - 1 x daily - 7 x weekly - 3 sets - 10 reps  Bridge with Upper Back on Swiss Ball - 1 x daily - 7 x weekly - 3 sets - 10 reps  Heel Raise on Step - 1 x daily - 7 x weekly - 3 sets - 10 reps  Standing Terminal Knee Extension with Resistance - 1 x daily - 7 x weekly - 3 sets - 10 reps - 5 sec hold  Wall Squat with Ball between Knees - 1 x daily - 7 x weekly - 1 sets - 10 reps - 10 sec hold  Single Leg Stance on Foam Pad - 1 x daily - 7 x weekly - 1 sets - 3 reps - 30 sec hold  Seated Long Arc Quad - 1 x daily - 7 x weekly - 2 sets - 10 reps  Prone Quadriceps Stretch - 1 x daily - 7 x weekly - 1 sets - 2 reps - 30 sec hold  Seated Hamstring Stretch - 1 x daily - 7 x weekly - 1 sets - 3 reps - 30 sec hold  Standing Gastroc Stretch on Step - 1 x daily - 7 x weekly - 1 sets - 3 reps - 30 sec hold     PLAN: See eval  [x] Continue per plan of care [] Alter current plan (see

## 2022-12-19 ENCOUNTER — HOSPITAL ENCOUNTER (OUTPATIENT)
Dept: PHYSICAL THERAPY | Age: 52
Setting detail: THERAPIES SERIES
Discharge: HOME | End: 2022-12-19
Payer: COMMERCIAL

## 2022-12-19 PROCEDURE — 97110 THERAPEUTIC EXERCISES: CPT

## 2022-12-19 PROCEDURE — 97016 VASOPNEUMATIC DEVICE THERAPY: CPT

## 2022-12-19 PROCEDURE — 97112 NEUROMUSCULAR REEDUCATION: CPT

## 2022-12-19 NOTE — FLOWSHEET NOTE
The [facility], [address] [facility] [address], [address] [place] [address]  Phone: [phone]   Fax:     [phone]      Physical Therapy Daily Treatment Note  Date:  2022    Patient Name:  Marlen Mccauley   \"JT\" :  1970  MRN: [id number]  Restrictions/Precautions:    Medical/Treatment Diagnosis Information:  Diagnosis: Primary osteoarthritis of left knee (M17.12)  Treatment Diagnosis: L knee pain Y75.466  Insurance/Certification information:  PT Insurance Information: [facility]  Physician Information:   Fay Camacho  Has the plan of care been signed (Y/N):        []  Yes  [x]  No     Date of Patient follow up with Physician: per md, mid Oct (not scheduled yet)      Is this a Progress Report:     []  Yes  [x]  No        If Yes:  Date Range for reporting period:  Beginning   Ending     Progress report will be due (10 Rx or 30 days whichever is less):        Recertification will be due (POC Duration  / 90 days whichever is less):          Visit # Insurance Allowable Auth Required   23 60 []  Yes []  No        Functional Scale: FOTO: 55/100    Date assessed:  10/19       Latex Allergy:  [x]NO      []YES  Preferred Language for Healthcare:   [x]English       []other:    Pain level:  0/10     SUBJECTIVE:  Pt reports he continues to feel some catching without tape or brace. He also put the electrodes along path of medial quad and that has helped VMO fire better. DOS: 9/15/22    OBJECTIVE:    ROM 0-135 °     Observation: portal sites healing well; pt amb with no crutches;  minimal skin irritation still noted, small area of broken skin (will hold on tape today)  : Trial with Breg lateral stabilization brace today. There was a bit of an adjustment period in the clinic however patella seemed to ARTEMIO [facility] in place\" and brace felt good.  Pt to take home with him and continue trial    RESTRICTIONS/PRECAUTIONS: acitivty moderation Exercises/Interventions: HEP updated 11/16  Exercise/Equipment Resistance/Repetitions Other comments   Stretching     Hamstring [id number]    Towel Pull gastroc [id number] slant   Inclined Calf     Hip Flexion     ITB     Groin                       SLR     Supine 3x10 (15\" hold first rep) 3# ^ 11/23   Abduction X10 pulses, x10 circles CW, x10 circles CCW repeat x2-3 sets 3# ^ 11/23   Adduction     Prone     SLR+     Clamshell + ABD iso 3x10  ^ 11/23, BlackTB   ^11/23, 3#, slow control    Isometrics     Quad sets with NMES (symmtric biphasic) 10\" hold x10 min 2nd TAPE for lateral tracking patella, 3rd NMES        Patellar Glides     Medial     Superior     Inferior          ROM     Sheet Pulls [id number] This ex 1st , 0-135 ° 22/38, discussed to re-introduce this ex prior to re-taping at home (11/21)   Hang Weights     Passive     Active     Weight Shift                       CKC     Calf raises 3x10 on slantboard Added 9/19   Wall sits + add iso 10\"x10 Changed 10/31   4\" (single step), up with L, down with L/ up with R, down with R, added 10/3; HOLD 12/14   1 leg stand 30\"x3 on BOSU (black side), alt R/L ^11/23   Squatting     Single leg squat 3x10 67 cm   CC TKE 5\"x30 ^ 11/2 CC level 6   Bridges + ADD iso on SB 5\"x20  ^ 11/16   Sidestepping 3x15' Grey loop, added 11/14   4\", added 11/9, HOLD 12/14   PRE     Extension  RANGE:   Flexion  RANGE:        Quantum machines     Leg press  3x10 #, ^12/19, cues for maintaining knee alignment, challenge noted   Leg extension     Leg curl          Manual interventions                     Therapeutic Exercise and NMR EXR  [x] (45463) Provided verbal/tactile cueing for activities related to strengthening, flexibility, endurance, ROM for improvements in LE, proximal hip, and core control with self care, mobility, lifting, ambulation.  [] (96497) Provided verbal/tactile cueing for activities related to improving balance, coordination, kinesthetic sense, posture, motor skill, proprioception  to assist with LE, proximal hip, and core control in self care, mobility, lifting, ambulation and eccentric single leg control. NMR and Therapeutic Activities:    [] (29381 or 51173) Provided verbal/tactile cueing for activities related to improving balance, coordination, kinesthetic sense, posture, motor skill, proprioception and motor activation to allow for proper function of core, proximal hip and LE with self care and ADLs  [] (54643) Gait Re-education- Provided training and instruction to the patient for proper LE, core and proximal hip recruitment and positioning and eccentric body weight control with ambulation re-education including up and down stairs     Home Exercise Program:    [x] (53283) Reviewed/Progressed HEP activities related to strengthening, flexibility, endurance, ROM of core, proximal hip and LE for functional self-care, mobility, lifting and ambulation/stair navigation   [] (15001)Reviewed/Progressed HEP activities related to improving balance, coordination, kinesthetic sense, posture, motor skill, proprioception of core, proximal hip and LE for self care, mobility, lifting, and ambulation/stair navigation      Manual Treatments:  PROM / STM / Oscillations-Mobs:  G-I, II, III, IV (PA's, Inf., Post.)  [x] (37896) Provided manual therapy to mobilize LE, proximal hip and/or LS spine soft tissue/joints for the purpose of modulating pain, promoting relaxation,  increasing ROM, reducing/eliminating soft tissue swelling/inflammation/restriction, improving soft tissue extensibility and allowing for proper ROM for normal function with self care, mobility, lifting and ambulation.      Modalities:  vaso 15    Charges:  Timed Code Treatment Minutes: 50   Total Treatment Minutes: 65       [] EVAL (LOW) 20082 (typically 20 minutes face-to-face)  [] EVAL (MOD) 52393 (typically 30 minutes face-to-face)  [] EVAL (HIGH) 98735 (typically 45 minutes face-to-face)  [] RE-EVAL   [x] TG(29005) x and requires additional follow up with physician  [] Other    Prognosis for POC: [x] Good [] Fair  [] Poor      Patient requires continued skilled intervention: [x] Yes  [] No    Treatment/Activity Tolerance:  [x] Patient able to complete treatment  [] Patient limited by fatigue  [] Patient limited by pain     [] Patient limited by other medical complications  [x] Other: Pt exhibits impaired firing of quad, specifically the VMO, contributing to lateral tracking patella and continued catching feeling and residual pain and soreness post op. PT and pt are focused on firing pattern of quad with NMES, taping, and specific exercise. Patient Education:  ITB rolling with foam roller or rolling pin    Access Code: [email or web address]  URL: [email or web address]  Date: 11/16/2022  Prepared by: Corazon Coughlin    Program Notes  ITB rolling x3-5 min    Exercises  Long Sitting Quad Set - 1 x daily - 7 x weekly - 10 reps - 10 hold  Active Straight Leg Raise with Quad Set - 1 x daily - 7 x weekly - 3 sets - 10 reps - 10 sec first rep hold  Sidelying Hip Abduction - 1 x daily - 7 x weekly - 3 sets - 10 reps  Clamshell with Resistance - 1 x daily - 7 x weekly - 3 sets - 10 reps  Bridge with Upper Back on Swiss Ball - 1 x daily - 7 x weekly - 3 sets - 10 reps  Heel Raise on Step - 1 x daily - 7 x weekly - 3 sets - 10 reps  Standing Terminal Knee Extension with Resistance - 1 x daily - 7 x weekly - 3 sets - 10 reps - 5 sec hold  Wall Squat with Ball between Knees - 1 x daily - 7 x weekly - 1 sets - 10 reps - 10 sec hold  Single Leg Stance on Foam Pad - 1 x daily - 7 x weekly - 1 sets - 3 reps - 30 sec hold  Seated Long Arc Quad - 1 x daily - 7 x weekly - 2 sets - 10 reps  Prone Quadriceps Stretch - 1 x daily - 7 x weekly - 1 sets - 2 reps - 30 sec hold  Seated Hamstring Stretch - 1 x daily - 7 x weekly - 1 sets - 3 reps - 30 sec hold  Standing Gastroc Stretch on Step - 1 x daily - 7 x weekly - 1 sets - 3 reps - 30 sec hold PLAN: See eval  [x] Continue per plan of care [] Alter current plan (see comments above)  [] Plan of care initiated [] Hold pending MD visit [] Discharge      Electronically signed by:  Miles Macdonald PT    Note: If patient does not return for scheduled/ recommended follow up visits, this note will serve as a discharge from care along with most recent update on progress.

## 2022-12-21 ENCOUNTER — HOSPITAL ENCOUNTER (OUTPATIENT)
Dept: PHYSICAL THERAPY | Age: 52
Setting detail: THERAPIES SERIES
Discharge: HOME | End: 2022-12-21
Payer: COMMERCIAL

## 2022-12-21 DIAGNOSIS — Z98.890 S/P LEFT KNEE ARTHROSCOPY: Primary | ICD-10-CM

## 2022-12-21 PROCEDURE — 97112 NEUROMUSCULAR REEDUCATION: CPT

## 2022-12-21 PROCEDURE — 97016 VASOPNEUMATIC DEVICE THERAPY: CPT

## 2022-12-21 PROCEDURE — 97110 THERAPEUTIC EXERCISES: CPT

## 2022-12-21 NOTE — FLOWSHEET NOTE
The [facility], [address] [facility] , [address] [place] [address]  Phone: [phone]   Fax:     [phone]      Physical Therapy Daily Treatment Note  Date:  2022    Patient Name:  Rylie Sanchez   \"JT\" :  1970  MRN: [id number]  Restrictions/Precautions:    Medical/Treatment Diagnosis Information:  Diagnosis: Primary osteoarthritis of left knee (M17.12)  Treatment Diagnosis: L knee pain I08.174  Insurance/Certification information:  PT Insurance Information: [facility]  Physician Information:   Godwin Emanuel  Has the plan of care been signed (Y/N):        []  Yes  [x]  No     Date of Patient follow up with Physician: per md, mid Oct (not scheduled yet)      Is this a Progress Report:     []  Yes  [x]  No        If Yes:  Date Range for reporting period:  Beginning   Ending     Progress report will be due (10 Rx or 30 days whichever is less): 59       Recertification will be due (POC Duration  / 90 days whichever is less):          Visit # Insurance Allowable Auth Required   24 60 []  Yes []  No        Functional Scale: FOTO: 55/100    Date assessed:  10/19       Latex Allergy:  [x]NO      []YES  Preferred Language for Healthcare:   [x]English       []other:    Pain level:  0/10     SUBJECTIVE:  Pt states he has felt aching when the tape is not on. Pt also looked up additional taping techniques and tried one. DOS: 9/15/22    OBJECTIVE:    ROM 0-135 °     Observation: portal sites healing well; pt amb with no crutches;  minimal skin irritation still noted, small area of broken skin (will hold on tape today)  : Trial with Breg lateral stabilization brace today. There was a bit of an adjustment period in the clinic however patella seemed to [facility] in place\" and brace felt good.  Pt to take home with him and continue trial    RESTRICTIONS/PRECAUTIONS: acitivty moderation     Exercises/Interventions: HEP updated 11/16  Exercise/Equipment Resistance/Repetitions Other comments   Stretching     Hamstring [id number]    Towel Pull gastroc [id number] slant   Inclined Calf     Hip Flexion     ITB     Groin                       SLR     Supine 3x10 (15\" hold first rep) 3# ^ 11/23   Abduction X10 pulses, x10 circles CW, x10 circles CCW repeat x2-3 sets 3# ^ 11/23   Adduction     Prone     SLR+     Clamshell + ABD iso 3x10  ^ 11/23, BlackTB   ^11/23, 3#, slow control    Isometrics     Quad sets with NMES (symmtric biphasic) 10\" hold x10 min 2nd TAPE for lateral tracking patella, 3rd NMES        Patellar Glides     Medial     Superior     Inferior          ROM     This ex 1st , 0-135 ° 22/38, discussed to re-introduce this ex prior to re-taping at home (11/21)   Hang Weights     Passive     Active     Weight Shift                       CKC     Calf raises 3x10 on slantboard Added 9/19   Wall sits + add iso 10\"x10 Changed 10/31   4\" (single step), up with L, down with L/ up with R, down with R, added 10/3; HOLD 12/14   1 leg stand 30\"x3 on BOSU (black side), alt R/L ^11/23   Squatting     67 cm   CC TKE 5\"x30 ^ 11/2 CC level 6   Bridges + ADD iso on SB 5\"x20  ^ 11/16   Sidestepping  Monster walk fwd, bwd 3x15'  3x15' Grey loop, added 11/14   4\", added 11/9, HOLD 12/14   PRE     Extension  RANGE:   Flexion  RANGE:        Quantum machines     Leg press  1x10 + 3 ECC, will do 130# for 2 sets, 140# for 1 set #, changed 12/21   Leg extension     Leg curl          Manual interventions                     Therapeutic Exercise and NMR EXR  [x] (45353) Provided verbal/tactile cueing for activities related to strengthening, flexibility, endurance, ROM for improvements in LE, proximal hip, and core control with self care, mobility, lifting, ambulation.  [] (51429) Provided verbal/tactile cueing for activities related to improving balance, coordination, kinesthetic sense, posture, motor skill, proprioception  to assist with LE, proximal hip, and core control in self care, mobility, lifting, ambulation and eccentric single leg control. NMR and Therapeutic Activities:    [] (06311 or 16844) Provided verbal/tactile cueing for activities related to improving balance, coordination, kinesthetic sense, posture, motor skill, proprioception and motor activation to allow for proper function of core, proximal hip and LE with self care and ADLs  [] (24464) Gait Re-education- Provided training and instruction to the patient for proper LE, core and proximal hip recruitment and positioning and eccentric body weight control with ambulation re-education including up and down stairs     Home Exercise Program:    [x] (84969) Reviewed/Progressed HEP activities related to strengthening, flexibility, endurance, ROM of core, proximal hip and LE for functional self-care, mobility, lifting and ambulation/stair navigation   [] (12822)Reviewed/Progressed HEP activities related to improving balance, coordination, kinesthetic sense, posture, motor skill, proprioception of core, proximal hip and LE for self care, mobility, lifting, and ambulation/stair navigation      Manual Treatments:  PROM / STM / Oscillations-Mobs:  G-I, II, III, IV (PA's, Inf., Post.)  [x] (05458) Provided manual therapy to mobilize LE, proximal hip and/or LS spine soft tissue/joints for the purpose of modulating pain, promoting relaxation,  increasing ROM, reducing/eliminating soft tissue swelling/inflammation/restriction, improving soft tissue extensibility and allowing for proper ROM for normal function with self care, mobility, lifting and ambulation.      Modalities:  vaso 15    Charges:  Timed Code Treatment Minutes: 50   Total Treatment Minutes: 65       [] EVAL (LOW) 75061 (typically 20 minutes face-to-face)  [] EVAL (MOD) 79767 (typically 30 minutes face-to-face)  [] EVAL (HIGH) 05190 (typically 45 minutes face-to-face)  [] RE-EVAL   [x] AV(49901) x  2   [] IONTO  [x] NMR (80319) x  1   [x] VASO  [] Manual (49531) x      [] Other:  [] TA x      [] Mech Traction (36200)  [] ES(attended) (28375)      [] ES (un) (90870):     GOALS:   Patient stated goal: return to community activities     [x] Progressing: [] Met: [] Not Met: [] Adjusted     Therapist goals for Patient:   Short Term Goals: To be achieved in: 2-4 weeks  1. Independent in HEP and progression per patient tolerance, in order to prevent re-injury. [x] Progressing: [] Met: [] Not Met: [] Adjusted   2. Patient will have a decrease in pain to facilitate improvement in movement, function, and ADLs as indicated by Functional Deficits. [x] Progressing: [] Met: [] Not Met: [] Adjusted     Long Term Goals: To be achieved in: 12 weeks  1. Foto68/100to assist with reaching prior level of function. [x] Progressing: [] Met: [] Not Met: [] Adjusted  2. Patient will demonstrate increased AROM to =R to allow for proper joint functioning as indicated by patients Functional Deficits. [x] Progressing: [] Met: [] Not Met: [] Adjusted  3. Patient will demonstrate an increase in Strength to good proximal hip strength and control  in LE to allow for proper functional mobility as indicated by patients Functional Deficits. [x] Progressing: [] Met: [] Not Met: [] Adjusted  4. Patient will return to normal heel to toe gait in community  ascend descend 12 steps alternating  functional activities without increased symptoms or restriction. [x] Progressing: [] Met: [] Not Met: [] Adjusted  Overall Progression Towards Functional goals/ Treatment Progress Update:  [] Patient is progressing as expected towards functional goals listed. [x] Progression is slowed due to complexities/Impairments listed. [] Progression has been slowed due to co-morbidities.   [] Plan just implemented, too soon to assess goals progression <30days   [] Goals require adjustment due to lack of progress  [] Patient is not progressing as expected and requires additional follow up with physician  [] Other    Prognosis for POC: [x] Good [] Fair  [] Poor      Patient requires continued skilled intervention: [x] Yes  [] No    Treatment/Activity Tolerance:  [x] Patient able to complete treatment  [] Patient limited by fatigue  [] Patient limited by pain     [] Patient limited by other medical complications  [x] Other: PT continues to focus on quad strengthening, specifically VMO. Pt has exhibited improved however deficits remain and he continues to have ? pain with WB loading exercises. Pt may benefit from BFR. Patient Education:  ITB rolling with foam roller or rolling pin    Access Code: [email or web address]  URL: [email or web address]  Date: 11/16/2022  Prepared by: Kate Caraballo    Program Notes  ITB rolling x3-5 min    Exercises  Long Sitting Quad Set - 1 x daily - 7 x weekly - 10 reps - 10 hold  Active Straight Leg Raise with Quad Set - 1 x daily - 7 x weekly - 3 sets - 10 reps - 10 sec first rep hold  Sidelying Hip Abduction - 1 x daily - 7 x weekly - 3 sets - 10 reps  Clamshell with Resistance - 1 x daily - 7 x weekly - 3 sets - 10 reps  Bridge with Upper Back on Swiss Ball - 1 x daily - 7 x weekly - 3 sets - 10 reps  Heel Raise on Step - 1 x daily - 7 x weekly - 3 sets - 10 reps  Standing Terminal Knee Extension with Resistance - 1 x daily - 7 x weekly - 3 sets - 10 reps - 5 sec hold  Wall Squat with Ball between Knees - 1 x daily - 7 x weekly - 1 sets - 10 reps - 10 sec hold  Single Leg Stance on Foam Pad - 1 x daily - 7 x weekly - 1 sets - 3 reps - 30 sec hold  Seated Long Arc Quad - 1 x daily - 7 x weekly - 2 sets - 10 reps  Prone Quadriceps Stretch - 1 x daily - 7 x weekly - 1 sets - 2 reps - 30 sec hold  Seated Hamstring Stretch - 1 x daily - 7 x weekly - 1 sets - 3 reps - 30 sec hold  Standing Gastroc Stretch on Step - 1 x daily - 7 x weekly - 1 sets - 3 reps - 30 sec hold     PLAN: See eval  [x] Continue per plan of care [] Alter current plan (see comments above)  [] Plan of care initiated [] Hold pending MD visit [] Discharge      Electronically signed by:  Martínez Green, PT    Note: If patient does not return for scheduled/ recommended follow up visits, this note will serve as a discharge from care along with most recent update on progress.

## 2022-12-28 ENCOUNTER — HOSPITAL ENCOUNTER (OUTPATIENT)
Dept: PHYSICAL THERAPY | Age: 52
Setting detail: THERAPIES SERIES
Discharge: HOME | End: 2022-12-28
Payer: COMMERCIAL

## 2022-12-28 PROCEDURE — 97110 THERAPEUTIC EXERCISES: CPT

## 2022-12-28 PROCEDURE — 97016 VASOPNEUMATIC DEVICE THERAPY: CPT

## 2022-12-28 PROCEDURE — 97112 NEUROMUSCULAR REEDUCATION: CPT

## 2022-12-28 NOTE — FLOWSHEET NOTE
The [address] [place] [address] 200, [address], [address] [place] [address]  Phone: [phone]   Fax:     [phone]      Physical Therapy Daily Treatment Note  Date:  2022    Patient Name:  Rosalie Lacy   \"JT\" :  1970  MRN: [id number]  Restrictions/Precautions:    Medical/Treatment Diagnosis Information:  Diagnosis: Primary osteoarthritis of left knee (M17.12)  Treatment Diagnosis: L knee pain M20.308  Insurance/Certification information:  PT Insurance Information: [facility]  Physician Information:   Jeanie Milan  Has the plan of care been signed (Y/N):        []  Yes  [x]  No     Date of Patient follow up with Physician: per md, mid Oct (not scheduled yet)      Is this a Progress Report:     []  Yes  [x]  No        If Yes:  Date Range for reporting period:  Beginning   Ending     Progress report will be due (10 Rx or 30 days whichever is less):        Recertification will be due (POC Duration  / 90 days whichever is less):          Visit # Insurance Allowable Auth Required   24 60 []  Yes []  No        Functional Scale: FOTO: 55/100    Date assessed:  10/19       Latex Allergy:  [x]NO      []YES  Preferred Language for Healthcare:   [x]English       []other:    Pain level:  0/10     SUBJECTIVE:  Pt states he has felt aching when the tape is not on. Pt also looked up additional taping techniques and tried one. DOS: 9/15/22    OBJECTIVE:    ROM 0-135 °     Observation: portal sites healing well; pt amb with no crutches;  minimal skin irritation still noted, small area of broken skin (will hold on tape today)  : Trial with Breg lateral stabilization brace today. There was a bit of an adjustment period in the clinic however patella seemed to [facility] in place\" and brace felt good.  Pt to take home with him and continue trial  : pt to keep brace and figured out why it wasn't feeling right    RESTRICTIONS/PRECAUTIONS: acitivty moderation     Exercises/Interventions: HEP updated 11/16  Exercise/Equipment Resistance/Repetitions Other comments   Stretching     Hamstring [id number]    Towel Pull gastroc [id number] slant   Inclined Calf     Hip Flexion     ITB     Groin                       SLR     Supine 3x10 (15\" hold first rep) 3# ^ 11/23   Abduction X10 pulses, x10 circles CW, x10 circles CCW repeat x2-3 sets 3# ^ 11/23   Adduction     Prone     SLR+     Clamshell + ABD iso 3x10  ^ 11/23, BlackTB   ^11/23, 3#, slow control    Isometrics     Quad sets with NMES (symmtric biphasic) 10\" hold x10 min 2nd TAPE for lateral tracking patella, 3rd NMES   Quad sets with biofeedback X5 min, squeeze 5 sec to meet target 80% target   Patellar Glides     Medial     Superior     Inferior          ROM     This ex 1st , 0-135 ° 22/38, discussed to re-introduce this ex prior to re-taping at home (11/21)   Hang Weights     Passive     Active     Weight Shift                       CKC     Calf raises 3x10 on slantboard Added 9/19   Wall sits + add iso 10\"x10 Changed 10/31   4\" (single step), up with L, down with L/ up with R, down with R, added 10/3; HOLD 12/14   1 leg stand 30\"x3 on BOSU (black side), alt R/L ^11/23   Squatting     67 cm   CC TKE 5\"x20 ^ 12/28 CC level 7   Bridges + ADD iso on SB 10\"x20  ^ 11/16   Sidestepping  Monster walk fwd, bwd 3x15'  3x15' Grey loop, added 11/14   4\", added 11/9, HOLD 12/14   PRE     Extension  RANGE:   Flexion  RANGE:        Quantum machines     Leg press  1x10 + 3 ECC, will do 130# for 2 sets, 140# for 1 set #, changed 12/21   Leg extension     Leg curl          Manual interventions                     Therapeutic Exercise and NMR EXR  [x] (32452) Provided verbal/tactile cueing for activities related to strengthening, flexibility, endurance, ROM for improvements in LE, proximal hip, and core control with self care, mobility, lifting, ambulation.  [] (52825) Provided verbal/tactile cueing for activities related to improving balance, coordination, kinesthetic sense, posture, motor skill, proprioception  to assist with LE, proximal hip, and core control in self care, mobility, lifting, ambulation and eccentric single leg control. NMR and Therapeutic Activities:    [] (60507 or 46866) Provided verbal/tactile cueing for activities related to improving balance, coordination, kinesthetic sense, posture, motor skill, proprioception and motor activation to allow for proper function of core, proximal hip and LE with self care and ADLs  [] (37582) Gait Re-education- Provided training and instruction to the patient for proper LE, core and proximal hip recruitment and positioning and eccentric body weight control with ambulation re-education including up and down stairs     Home Exercise Program:    [x] (07858) Reviewed/Progressed HEP activities related to strengthening, flexibility, endurance, ROM of core, proximal hip and LE for functional self-care, mobility, lifting and ambulation/stair navigation   [] (73650)Reviewed/Progressed HEP activities related to improving balance, coordination, kinesthetic sense, posture, motor skill, proprioception of core, proximal hip and LE for self care, mobility, lifting, and ambulation/stair navigation      Manual Treatments:  PROM / STM / Oscillations-Mobs:  G-I, II, III, IV (PA's, Inf., Post.)  [x] (04420) Provided manual therapy to mobilize LE, proximal hip and/or LS spine soft tissue/joints for the purpose of modulating pain, promoting relaxation,  increasing ROM, reducing/eliminating soft tissue swelling/inflammation/restriction, improving soft tissue extensibility and allowing for proper ROM for normal function with self care, mobility, lifting and ambulation.      Modalities:  vaso 15    Charges:  Timed Code Treatment Minutes: 50   Total Treatment Minutes: 65       [] EVAL (LOW) 69291 (typically 20 minutes face-to-face)  [] EVAL (MOD) 40488 (typically 30 minutes face-to-face)  [] EVAL (HIGH) 03374 (typically 45 minutes face-to-face)  [] RE-EVAL   [x] FN(61187) x  2   [] IONTO  [x] NMR (64845) x  1   [x] VASO  [] Manual (53907) x      [] Other:  [] TA x      [] Mech Traction (72989)  [] ES(attended) (40302)      [] ES (un) (93421):     GOALS:   Patient stated goal: return to community activities     [x] Progressing: [] Met: [] Not Met: [] Adjusted     Therapist goals for Patient:   Short Term Goals: To be achieved in: 2-4 weeks  1. Independent in HEP and progression per patient tolerance, in order to prevent re-injury. [x] Progressing: [] Met: [] Not Met: [] Adjusted   2. Patient will have a decrease in pain to facilitate improvement in movement, function, and ADLs as indicated by Functional Deficits. [x] Progressing: [] Met: [] Not Met: [] Adjusted     Long Term Goals: To be achieved in: 12 weeks  1. Foto68/100to assist with reaching prior level of function. [x] Progressing: [] Met: [] Not Met: [] Adjusted  2. Patient will demonstrate increased AROM to =R to allow for proper joint functioning as indicated by patients Functional Deficits. [x] Progressing: [] Met: [] Not Met: [] Adjusted  3. Patient will demonstrate an increase in Strength to good proximal hip strength and control  in LE to allow for proper functional mobility as indicated by patients Functional Deficits. [x] Progressing: [] Met: [] Not Met: [] Adjusted  4. Patient will return to normal heel to toe gait in community  ascend descend 12 steps alternating  functional activities without increased symptoms or restriction. [x] Progressing: [] Met: [] Not Met: [] Adjusted  Overall Progression Towards Functional goals/ Treatment Progress Update:  [] Patient is progressing as expected towards functional goals listed. [x] Progression is slowed due to complexities/Impairments listed. [] Progression has been slowed due to co-morbidities.   [] Plan just implemented, too soon to assess goals progression <30days   [] Goals require adjustment due to lack of progress  [] Patient is not progressing as expected and requires additional follow up with physician  [] Other    Prognosis for POC: [x] Good [] Fair  [] Poor      Patient requires continued skilled intervention: [x] Yes  [] No    Treatment/Activity Tolerance:  [x] Patient able to complete treatment  [] Patient limited by fatigue  [] Patient limited by pain     [] Patient limited by other medical complications  [x] Other: PT continues to focus on quad strengthening, specifically VMO. Pt has exhibited improved however deficits remain and he continues to have ? pain with WB loading exercises. Pt may benefit from BFR. Patient Education:  ITB rolling with foam roller or rolling pin    Access Code: [email or web address]  URL: [employer]/  Date: 11/16/2022  Prepared by: Aspen Stage    Program Notes  ITB rolling x3-5 min    Exercises  Long Sitting Quad Set - 1 x daily - 7 x weekly - 10 reps - 10 hold  Active Straight Leg Raise with Quad Set - 1 x daily - 7 x weekly - 3 sets - 10 reps - 10 sec first rep hold  Sidelying Hip Abduction - 1 x daily - 7 x weekly - 3 sets - 10 reps  Clamshell with Resistance - 1 x daily - 7 x weekly - 3 sets - 10 reps  Bridge with Upper Back on Swiss Ball - 1 x daily - 7 x weekly - 3 sets - 10 reps  Heel Raise on Step - 1 x daily - 7 x weekly - 3 sets - 10 reps  Standing Terminal Knee Extension with Resistance - 1 x daily - 7 x weekly - 3 sets - 10 reps - 5 sec hold  Wall Squat with Ball between Knees - 1 x daily - 7 x weekly - 1 sets - 10 reps - 10 sec hold  Single Leg Stance on Foam Pad - 1 x daily - 7 x weekly - 1 sets - 3 reps - 30 sec hold  Seated Long Arc Quad - 1 x daily - 7 x weekly - 2 sets - 10 reps  Prone Quadriceps Stretch - 1 x daily - 7 x weekly - 1 sets - 2 reps - 30 sec hold  Seated Hamstring Stretch - 1 x daily - 7 x weekly - 1 sets - 3 reps - 30 sec hold  Standing Gastroc Stretch on Step - 1 x daily - 7 x weekly - 1 sets - 3 reps - 30 sec hold     PLAN: See eval  [x] Continue per plan of care [] Alter current plan (see comments above)  [] Plan of care initiated [] Hold pending MD visit [] Discharge      Electronically signed by:  Liudmila Barron PT    Note: If patient does not return for scheduled/ recommended follow up visits, this note will serve as a discharge from care along with most recent update on progress.

## 2023-01-04 ENCOUNTER — HOSPITAL ENCOUNTER (OUTPATIENT)
Dept: PHYSICAL THERAPY | Age: 53
Setting detail: THERAPIES SERIES
Discharge: HOME | End: 2023-01-04

## 2023-01-04 NOTE — FLOWSHEET NOTE
[facility], [address], [place] [address]  Phone: [phone]   Fax:     [phone]      Physical Therapy Daily Treatment Note  Date:  2023    Patient Name:  Raymond Lr   \"JT\" :  1970  MRN: [id number]  Restrictions/Precautions:    Medical/Treatment Diagnosis Information:  Diagnosis: Primary osteoarthritis of left knee (M17.12)  Treatment Diagnosis: L knee pain M25.562  Insurance/Certification information:  PT Insurance Information: [facility]  Physician Information:   Kurt Ludwig  Has the plan of care been signed (Y/N):        []  Yes  [x]  No     Date of Patient follow up with Physician: per md, mid Oct (not scheduled yet)      Is this a Progress Report:     []  Yes  [x]  No        If Yes:  Date Range for reporting period:  Beginning   Ending     Progress report will be due (10 Rx or 30 days whichever is less): 10/16       Recertification will be due (POC Duration  / 90 days whichever is less):          Visit # Insurance Allowable Auth Required   1  25 total 60 []  Yes []  No        Functional Scale: FOTO: 55/100    Date assessed:  10/19       Latex Allergy:  [x]NO      []YES  Preferred Language for Healthcare:   [x]English       []other:    Pain level:  0/10     SUBJECTIVE:  Pt continues to feel improvement overall with catching and pain. He does still have difficulty loading quad in WB position.      DOS: 9/15/22    OBJECTIVE:    ROM 0-135 °     Observation: portal sites healing well; pt amb with no crutches;  minimal skin irritation still noted, small area of broken skin (will hold on tape today)  : Trial with Breg lateral stabilization brace today. There was a bit of an adjustment period in the clinic however patella seemed to \"settle in place\" and brace felt good. Pt to take home with him and continue trial  : pt to keep brace and figured out why it wasn't feeling  right    RESTRICTIONS/PRECAUTIONS: acitivty moderation     Exercises/Interventions: HEP updated 11/16  Exercise/Equipment Resistance/Repetitions Other comments   Stretching     Hamstring [id number]    Towel Pull gastroc [id number] slant   Inclined Calf     Hip Flexion     ITB     Groin                       SLR     Supine 3x10 4# ^ 1/4   Abduction 3x10 4# ^ 1/4   Adduction     Prone     SLR+     Clamshell + ABD iso 3x10  ^ 11/23, BlackTB   ^11/23, 3#, slow control    Isometrics     Quad sets Biofeedback 10\" hold x10 min @ 90% max 1st ex (no brace)   Patellar Glides     Medial     Superior     Inferior          ROM     , 0-135 ° 22/38, discussed to re-introduce this ex prior to re-taping at home (11/21)   Hang Weights     Passive     Active     Weight Shift                       CKC     Calf raises 3x10 on slantboard Added 9/19   Wall sits + add iso 10\"x10 Changed 10/31   4\" (single step), up with L, down with L/ up with R, down with R, added 10/3; HOLD 12/14   1 leg stand 30\"x3 on BOSU (black side), alt R/L ^11/23   Squatting     67 cm   CC TKE 5\"x20 ^ 12/28 CC level 7   Bridges + ADD iso on SB 10\"x20  ^ 11/16   Sidestepping  Monster walk fwd, bwd 3x15'  3x15' Grey loop, added 11/14   4\", added 11/9, HOLD 12/14   PRE     Extension  RANGE:   Flexion  RANGE:        Quantum machines     Leg press  ECC 3x10 130# changed 12/21   Leg extension     Leg curl          Manual interventions                     Therapeutic Exercise and NMR EXR  [x] (73595) Provided verbal/tactile cueing for activities related to strengthening, flexibility, endurance, ROM for improvements in LE, proximal hip, and core control with self care, mobility, lifting, ambulation.  [] (85513) Provided verbal/tactile cueing for activities related to improving balance, coordination, kinesthetic sense, posture, motor skill, proprioception  to assist with LE, proximal hip, and core control in self care, mobility, lifting, ambulation and eccentric single leg control. NMR and Therapeutic Activities:    [] (90013 or 58917) Provided verbal/tactile cueing for activities related to improving balance, coordination, kinesthetic sense, posture, motor skill, proprioception and motor activation to allow for proper function of core, proximal hip and LE with self care and ADLs  [] (24896) Gait Re-education- Provided training and instruction to the patient for proper LE, core and proximal hip recruitment and positioning and eccentric body weight control with ambulation re-education including up and down stairs     Home Exercise Program:    [x] (81994) Reviewed/Progressed HEP activities related to strengthening, flexibility, endurance, ROM of core, proximal hip and LE for functional self-care, mobility, lifting and ambulation/stair navigation   [] (08514)Reviewed/Progressed HEP activities related to improving balance, coordination, kinesthetic sense, posture, motor skill, proprioception of core, proximal hip and LE for self care, mobility, lifting, and ambulation/stair navigation      Manual Treatments:  PROM / STM / Oscillations-Mobs:  G-I, II, III, IV (PA's, Inf., Post.)  [x] (60840) Provided manual therapy to mobilize LE, proximal hip and/or LS spine soft tissue/joints for the purpose of modulating pain, promoting relaxation,  increasing ROM, reducing/eliminating soft tissue swelling/inflammation/restriction, improving soft tissue extensibility and allowing for proper ROM for normal function with self care, mobility, lifting and ambulation.      Modalities:  vaso 15    Charges:  Timed Code Treatment Minutes: 50   Total Treatment Minutes: 65       [] EVAL (LOW) 88073 (typically 20 minutes face-to-face)  [] EVAL (MOD) 13425 (typically 30 minutes face-to-face)  [] EVAL (HIGH) 05355 (typically 45 minutes face-to-face)  [] RE-EVAL   [x] RO(38143) x  2   [] IONTO  [x] NMR (36085) x  1   [x] VASO  [] Manual (91570) x      [] Other:  [] TA x      [] Mech Traction (65496)  [] ES(attended) (55447)      [] ES (un) (87040):     GOALS:   Patient stated goal: return to community activities     [x] Progressing: [] Met: [] Not Met: [] Adjusted     Therapist goals for Patient:   Short Term Goals: To be achieved in: 2-4 weeks  1. Independent in HEP and progression per patient tolerance, in order to prevent re-injury. [x] Progressing: [] Met: [] Not Met: [] Adjusted   2. Patient will have a decrease in pain to facilitate improvement in movement, function, and ADLs as indicated by Functional Deficits. [x] Progressing: [] Met: [] Not Met: [] Adjusted     Long Term Goals: To be achieved in: 12 weeks  1. Foto68/100to assist with reaching prior level of function. [x] Progressing: [] Met: [] Not Met: [] Adjusted  2. Patient will demonstrate increased AROM to =R to allow for proper joint functioning as indicated by patients Functional Deficits. [x] Progressing: [] Met: [] Not Met: [] Adjusted  3. Patient will demonstrate an increase in Strength to good proximal hip strength and control  in LE to allow for proper functional mobility as indicated by patients Functional Deficits. [x] Progressing: [] Met: [] Not Met: [] Adjusted  4. Patient will return to normal heel to toe gait in community  ascend descend 12 steps alternating  functional activities without increased symptoms or restriction. [x] Progressing: [] Met: [] Not Met: [] Adjusted  Overall Progression Towards Functional goals/ Treatment Progress Update:  [] Patient is progressing as expected towards functional goals listed. [x] Progression is slowed due to complexities/Impairments listed. [] Progression has been slowed due to co-morbidities.   [] Plan just implemented, too soon to assess goals progression <30days   [] Goals require adjustment due to lack of progress  [] Patient is not progressing as expected and requires additional follow up with physician  [] Other    Prognosis for POC: [x] Good [] Fair  [] Poor      Patient requires continued skilled intervention: [x] Yes  [] No    Treatment/Activity Tolerance:  [x] Patient able to complete treatment  [] Patient limited by fatigue  [] Patient limited by pain     [] Patient limited by other medical complications  [x] Other: PT continues to focus on quad strengthening, specifically VMO. Pt has exhibited improved however deficits remain and he continues to have ? pain with WB loading exercises. Pt may benefit from BFR. Patient Education:  ITB rolling with foam roller or rolling pin    Access Code: [email or web address]  URL: [email or web address]  Date: 11/16/2022  Prepared by: Jarrett Poplar    Program Notes  ITB rolling x3-5 min    Exercises  Long Sitting Quad Set - 1 x daily - 7 x weekly - 10 reps - 10 hold  Active Straight Leg Raise with Quad Set - 1 x daily - 7 x weekly - 3 sets - 10 reps - 10 sec first rep hold  Sidelying Hip Abduction - 1 x daily - 7 x weekly - 3 sets - 10 reps  Clamshell with Resistance - 1 x daily - 7 x weekly - 3 sets - 10 reps  Bridge with Upper Back on Swiss Ball - 1 x daily - 7 x weekly - 3 sets - 10 reps  Heel Raise on Step - 1 x daily - 7 x weekly - 3 sets - 10 reps  Standing Terminal Knee Extension with Resistance - 1 x daily - 7 x weekly - 3 sets - 10 reps - 5 sec hold  Wall Squat with Ball between Knees - 1 x daily - 7 x weekly - 1 sets - 10 reps - 10 sec hold  Single Leg Stance on Foam Pad - 1 x daily - 7 x weekly - 1 sets - 3 reps - 30 sec hold  Seated Long Arc Quad - 1 x daily - 7 x weekly - 2 sets - 10 reps  Prone Quadriceps Stretch - 1 x daily - 7 x weekly - 1 sets - 2 reps - 30 sec hold  Seated Hamstring Stretch - 1 x daily - 7 x weekly - 1 sets - 3 reps - 30 sec hold  Standing Gastroc Stretch on Step - 1 x daily - 7 x weekly - 1 sets - 3 reps - 30 sec hold     PLAN: See eval  [x] Continue per plan of care [] Alter current plan (see comments above)  [] Plan of care initiated [] Hold pending MD visit [] Discharge      Electronically signed by: Thomasenia Ormond, PT    Note: If patient does not return for scheduled/ recommended follow up visits, this note will serve as a discharge from care along with most recent update on progress.

## 2023-01-09 ENCOUNTER — HOSPITAL ENCOUNTER (OUTPATIENT)
Dept: PHYSICAL THERAPY | Age: 53
Setting detail: THERAPIES SERIES
Discharge: HOME | End: 2023-01-09
Payer: COMMERCIAL

## 2023-01-09 NOTE — FLOWSHEET NOTE
Physical Therapy  Cancellation/No-show Note  Patient Name:  Shane Urban  :  1970   Date:  2023  Cancelled visits to date: 2  No-shows to date: 0    For today's appointment patient:  [x]  Cancelled  []  Rescheduled appointment  []  No-show     Reason given by patient:  []  Patient ill  []  Conflicting appointment  []  No transportation    []  Conflict with work  []  No reason given  [x]  Other:  PT had to cancel this appt due to sick child   Comments:      Electronically signed by:  Franklin Michelle PT, PT

## 2023-01-11 ENCOUNTER — HOSPITAL ENCOUNTER (OUTPATIENT)
Dept: PHYSICAL THERAPY | Age: 53
Setting detail: THERAPIES SERIES
Discharge: HOME | End: 2023-01-11
Payer: COMMERCIAL

## 2023-01-11 PROCEDURE — 97112 NEUROMUSCULAR REEDUCATION: CPT

## 2023-01-11 PROCEDURE — 97016 VASOPNEUMATIC DEVICE THERAPY: CPT

## 2023-01-11 PROCEDURE — 97110 THERAPEUTIC EXERCISES: CPT

## 2023-01-11 NOTE — FLOWSHEET NOTE
The [address] [place], [place] [address], [address], [address] [place] [address]  Phone: [phone]   Fax:     [phone]      Physical Therapy Daily Treatment Note  Date:  2023    Patient Name:  Magdalena Mack   \"JT\" :  1970  MRN: [id number]  Restrictions/Precautions:    Medical/Treatment Diagnosis Information:  Diagnosis: Primary osteoarthritis of left knee (M17.12)  Treatment Diagnosis: L knee pain N78.322  Insurance/Certification information:  PT Insurance Information: [facility]  Physician Information:   Raquel Comment  Has the plan of care been signed (Y/N):        []  Yes  [x]  No     Date of Patient follow up with Physician: per md, mid Oct (not scheduled yet)      Is this a Progress Report:     []  Yes  [x]  No        If Yes:  Date Range for reporting period:  Beginning   Ending     Progress report will be due (10 Rx or 30 days whichever is less):        Recertification will be due (POC Duration  / 90 days whichever is less):          Visit # Insurance Allowable Auth Required   3  27 total 60 []  Yes []  No        Functional Scale: FOTO: 55/100    Date assessed:  10/19       Latex Allergy:  [x]NO      []YES  Preferred Language for Healthcare:   [x]English       []other:    Pain level:  0/10     SUBJECTIVE:  Pt continues to feel improvement. He continues to try to figure out what feels best, brace, no brace or taping. He does not ? stability all around with brace vs tape. Pt continues to have difficulty with catching in ? flexion but is not painful. DOS: 9/15/22    OBJECTIVE:    ROM 0-135 °     Observation: portal sites healing well; pt amb with no crutches;  minimal skin irritation still noted, small area of broken skin (will hold on tape today)  : Trial with Breg lateral stabilization brace today.  There was a bit of an adjustment period in the clinic however patella seemed to \"settle in place\" and brace felt good. Pt to take home with him and continue trial  12/28: pt to keep brace and figured out why it wasn't feeling right    RESTRICTIONS/PRECAUTIONS: acitivty moderation     Exercises/Interventions: HEP updated 11/16  Exercise/Equipment Resistance/Repetitions Other comments   Stretching     Hamstring [id number]    Towel Pull gastroc [id number] slant   Inclined Calf     Hip Flexion     ITB     Groin                       SLR     Supine 3x10 4# ^ 1/4   Abduction 3x10 4# ^ 1/4   Adduction     Prone     SLR+     Clamshell + ABD iso  Side plank clam NV 3x10  ^ 11/23, BlackTB   ^11/23, 3#, slow control    Isometrics     Quad sets Biofeedback 10\" hold x10 min @ 90% max 1st ex with taping taping   Patellar Glides     Medial     Superior     Inferior          ROM     , 0-135 ° 22/38, discussed to re-introduce this ex prior to re-taping at home (11/21)   Hang Weights     Passive     Active     Weight Shift                       CKC     Calf raises 3x10 on slantboard Added 9/19   Wall sits with R foot on soccer ball 10\"x10 Changed 1/11, VC small ROM   4\" (single step), up with L, down with L/ up with R, down with R, added 10/3; HOLD 12/14   1 leg stand 30\"x3 on BOSU (black side), alt R/L ^11/23   Squatting     Single leg squat with TRX 2x10 Added 1/11, VC   CC TKE 5\"x30 ^ 12/28 CC level 7   ^ 11/16   Sidestepping  Monster walk fwd, bwd 3x15'  3x15' Grey loop, added 11/14   LSD  2x7 2\" foam, significant shaking   PRE     Extension  RANGE:   Flexion  RANGE:        Quantum machines     Leg press  ECC 3x10 130# changed 12/21, noted ? pain 1/11   Leg extension     Leg curl          Manual interventions                     Therapeutic Exercise and NMR EXR  [x] (53516) Provided verbal/tactile cueing for activities related to strengthening, flexibility, endurance, ROM for improvements in LE, proximal hip, and core control with self care, mobility, lifting, ambulation.  [] (40225) Provided verbal/tactile cueing for activities related to  improving balance, coordination, kinesthetic sense, posture, motor skill, proprioception  to assist with LE, proximal hip, and core control in self care, mobility, lifting, ambulation and eccentric single leg control. NMR and Therapeutic Activities:    [] (69310 or 75471) Provided verbal/tactile cueing for activities related to improving balance, coordination, kinesthetic sense, posture, motor skill, proprioception and motor activation to allow for proper function of core, proximal hip and LE with self care and ADLs  [] (34952) Gait Re-education- Provided training and instruction to the patient for proper LE, core and proximal hip recruitment and positioning and eccentric body weight control with ambulation re-education including up and down stairs     Home Exercise Program:    [x] (26905) Reviewed/Progressed HEP activities related to strengthening, flexibility, endurance, ROM of core, proximal hip and LE for functional self-care, mobility, lifting and ambulation/stair navigation   [] (86990)Reviewed/Progressed HEP activities related to improving balance, coordination, kinesthetic sense, posture, motor skill, proprioception of core, proximal hip and LE for self care, mobility, lifting, and ambulation/stair navigation      Manual Treatments:  PROM / STM / Oscillations-Mobs:  G-I, II, III, IV (PA's, Inf., Post.)  [x] (76712) Provided manual therapy to mobilize LE, proximal hip and/or LS spine soft tissue/joints for the purpose of modulating pain, promoting relaxation,  increasing ROM, reducing/eliminating soft tissue swelling/inflammation/restriction, improving soft tissue extensibility and allowing for proper ROM for normal function with self care, mobility, lifting and ambulation.      Modalities:  vaso 15    Charges:  Timed Code Treatment Minutes: 50   Total Treatment Minutes: 65       [] EVAL (LOW) 04148 (typically 20 minutes face-to-face)  [] EVAL (MOD) 43025 (typically 30 minutes face-to-face)  [] EVAL (HIGH) 98250 (typically 45 minutes face-to-face)  [] RE-EVAL   [x] LI(95477) x  2   [] IONTO  [x] NMR (59694) x  1   [x] VASO  [] Manual (28486) x      [] Other:  [] TA x      [] Mech Traction (20007)  [] ES(attended) (61888)      [] ES (un) (82844):     GOALS:   Patient stated goal: return to community activities     [x] Progressing: [] Met: [] Not Met: [] Adjusted     Therapist goals for Patient:   Short Term Goals: To be achieved in: 2-4 weeks  1. Independent in HEP and progression per patient tolerance, in order to prevent re-injury. [x] Progressing: [] Met: [] Not Met: [] Adjusted   2. Patient will have a decrease in pain to facilitate improvement in movement, function, and ADLs as indicated by Functional Deficits. [x] Progressing: [] Met: [] Not Met: [] Adjusted     Long Term Goals: To be achieved in: 12 weeks  1. Foto68/100to assist with reaching prior level of function. [x] Progressing: [] Met: [] Not Met: [] Adjusted  2. Patient will demonstrate increased AROM to =R to allow for proper joint functioning as indicated by patients Functional Deficits. [x] Progressing: [] Met: [] Not Met: [] Adjusted  3. Patient will demonstrate an increase in Strength to good proximal hip strength and control  in LE to allow for proper functional mobility as indicated by patients Functional Deficits. [x] Progressing: [] Met: [] Not Met: [] Adjusted  4. Patient will return to normal heel to toe gait in community  ascend descend 12 steps alternating  functional activities without increased symptoms or restriction. [x] Progressing: [] Met: [] Not Met: [] Adjusted  Overall Progression Towards Functional goals/ Treatment Progress Update:  [] Patient is progressing as expected towards functional goals listed. [x] Progression is slowed due to complexities/Impairments listed. [] Progression has been slowed due to co-morbidities.   [] Plan just implemented, too soon to assess goals progression <30days   [] Goals require adjustment due to lack of progress  [] Patient is not progressing as expected and requires additional follow up with physician  [] Other    Prognosis for POC: [x] Good [] Fair  [] Poor      Patient requires continued skilled intervention: [x] Yes  [] No    Treatment/Activity Tolerance:  [x] Patient able to complete treatment  [] Patient limited by fatigue  [] Patient limited by pain     [] Patient limited by other medical complications  [x] Other: Pt able to tolerate ? loading today in very small ROM noting that this significant improvement. Continued instability noted however no ? pain. Pt will continue to benefit from ? strength, activation and firing pattern of quad/VMO to tolerate increasing loads. Pt will also continue to benefit from gross LE strengthening. Patient Education:  ITB rolling with foam roller or rolling pin    Access Code: [email or web address]  URL: [email or web address]  Date: 11/16/2022  Prepared by: Amos Howard    Program Notes  ITB rolling x3-5 min    Exercises  Long Sitting Quad Set - 1 x daily - 7 x weekly - 10 reps - 10 hold  Active Straight Leg Raise with Quad Set - 1 x daily - 7 x weekly - 3 sets - 10 reps - 10 sec first rep hold  Sidelying Hip Abduction - 1 x daily - 7 x weekly - 3 sets - 10 reps  Clamshell with Resistance - 1 x daily - 7 x weekly - 3 sets - 10 reps  Bridge with Upper Back on Swiss Ball - 1 x daily - 7 x weekly - 3 sets - 10 reps  Heel Raise on Step - 1 x daily - 7 x weekly - 3 sets - 10 reps  Standing Terminal Knee Extension with Resistance - 1 x daily - 7 x weekly - 3 sets - 10 reps - 5 sec hold  Wall Squat with Ball between Knees - 1 x daily - 7 x weekly - 1 sets - 10 reps - 10 sec hold  Single Leg Stance on Foam Pad - 1 x daily - 7 x weekly - 1 sets - 3 reps - 30 sec hold  Seated Long Arc Quad - 1 x daily - 7 x weekly - 2 sets - 10 reps  Prone Quadriceps Stretch - 1 x daily - 7 x weekly - 1 sets - 2 reps - 30 sec hold  Seated Hamstring Stretch - 1 x daily - 7 x weekly - 1 sets - 3 reps - 30 sec hold  Standing Gastroc Stretch on Step - 1 x daily - 7 x weekly - 1 sets - 3 reps - 30 sec hold     PLAN: See eval  [x] Continue per plan of care [] Alter current plan (see comments above)  [] Plan of care initiated [] Hold pending MD visit [] Discharge      Electronically signed by:  Jose Valladares PT    Note: If patient does not return for scheduled/ recommended follow up visits, this note will serve as a discharge from care along with most recent update on progress.

## 2023-01-16 ENCOUNTER — HOSPITAL ENCOUNTER (OUTPATIENT)
Dept: PHYSICAL THERAPY | Age: 53
Setting detail: THERAPIES SERIES
Discharge: HOME | End: 2023-01-16
Payer: COMMERCIAL

## 2023-01-16 PROCEDURE — 97112 NEUROMUSCULAR REEDUCATION: CPT

## 2023-01-16 PROCEDURE — 97110 THERAPEUTIC EXERCISES: CPT

## 2023-01-16 NOTE — FLOWSHEET NOTE
The [address] [place] [address],[address], 386 [facility] 3360 [facility], [address] [place] [address]  Phone: [phone]   Fax:     [phone]      Physical Therapy Daily Treatment Note  Date:  2023    Patient Name:  Zayra Barnett   \"JT\" :  1970  MRN: [id number]  Restrictions/Precautions:    Medical/Treatment Diagnosis Information:  Diagnosis: Primary osteoarthritis of left knee (M17.12)  Treatment Diagnosis: L knee pain H91.466  Insurance/Certification information:  PT Insurance Information: [facility]  Physician Information:   Alli Mckeon  Has the plan of care been signed (Y/N):        []  Yes  [x]  No     Date of Patient follow up with Physician: per md, mid Oct (not scheduled yet)      Is this a Progress Report:     []  Yes  [x]  No        If Yes:  Date Range for reporting period:  Beginning   Ending     Progress report will be due (10 Rx or 30 days whichever is less):        Recertification will be due (POC Duration  / 90 days whichever is less):          Visit # Insurance Allowable Auth Required   4  28 total 60 []  Yes []  No        Functional Scale: FOTO: 55/100    Date assessed:  10/19       Latex Allergy:  [x]NO      []YES  Preferred Language for Healthcare:   [x]English       []other:    Pain level:  0/10     SUBJECTIVE:  Pt reports he has felt a little improvement since not wearing brace. He does get catching but generally is not painful. He tapes for ex only. DOS: 9/15/22    OBJECTIVE:    ROM 0-135 °     Observation: portal sites healing well; pt amb with no crutches;  minimal skin irritation still noted, small area of broken skin (will hold on tape today)  : Trial with Breg lateral stabilization brace today. There was a bit of an adjustment period in the clinic however patella seemed to [facility] in place\" and brace felt good.  Pt to take home with him and continue trial  : pt to keep brace and figured out why it wasn't feeling right    RESTRICTIONS/PRECAUTIONS: acitivty moderation     Exercises/Interventions: HEP updated 11/16  Exercise/Equipment Resistance/Repetitions Other comments   Stretching     Hamstring [id number]    gastroc [id number] slant   Inclined Calf     Hip Flexion     ITB     Groin                       SLR     Supine 3x10 4# ^ 1/4   Abduction 3x10 4# ^ 1/4   Adduction     Prone     SLR+     Side plank clam 3x10 ^ 11/23, BlackTB  Added 1/16, GTB   ^11/23, 3#, slow control    Isometrics     Quad sets Biofeedback 10\" hold x5 min @ 90% max 1st ex with taping taping   Patellar Glides     Medial     Superior     Inferior          ROM     , 0-135 ° 22/38, discussed to re-introduce this ex prior to re-taping at home (11/21)   Hang Weights     Passive     Active     Weight Shift                       CKC     Calf raises 3x10 on slantboard Added 9/19   Wall sits with R foot on soccer ball 10\"x10 Changed 1/11, VC small ROM   4\" (single step), up with L, down with L/ up with R, down with R, added 10/3; HOLD 12/14   ^11/23, D/C   Squatting     Single leg dead lift 2x10 10# Added 1/16   Single leg squat with TRX 2x10 Added 1/11, VC   CC TKE 5\"x30 ^ 12/28 CC level 7    NV ^ 11/16   Sidestepping  Monster walk fwd, bwd 3x15'  3x15' Grey loop, added 11/14   LSD  3x10 4\" ^1/16 (hold on for balance)   PRE     Extension  RANGE:   Flexion  RANGE:        Quantum machines     Leg press  ECC 3x10 130# changed 12/21, noted ? pain 1/11   Leg extension     Leg curl          Manual interventions                     Therapeutic Exercise and NMR EXR  [x] (11055) Provided verbal/tactile cueing for activities related to strengthening, flexibility, endurance, ROM for improvements in LE, proximal hip, and core control with self care, mobility, lifting, ambulation.  [] (08022) Provided verbal/tactile cueing for activities related to improving balance, coordination, kinesthetic sense, posture, motor skill, proprioception  to assist with LE, proximal hip,  and core control in self care, mobility, lifting, ambulation and eccentric single leg control. NMR and Therapeutic Activities:    [] (54611 or 77441) Provided verbal/tactile cueing for activities related to improving balance, coordination, kinesthetic sense, posture, motor skill, proprioception and motor activation to allow for proper function of core, proximal hip and LE with self care and ADLs  [] (13514) Gait Re-education- Provided training and instruction to the patient for proper LE, core and proximal hip recruitment and positioning and eccentric body weight control with ambulation re-education including up and down stairs     Home Exercise Program:    [x] (78172) Reviewed/Progressed HEP activities related to strengthening, flexibility, endurance, ROM of core, proximal hip and LE for functional self-care, mobility, lifting and ambulation/stair navigation   [] (14703)Reviewed/Progressed HEP activities related to improving balance, coordination, kinesthetic sense, posture, motor skill, proprioception of core, proximal hip and LE for self care, mobility, lifting, and ambulation/stair navigation      Manual Treatments:  PROM / STM / Oscillations-Mobs:  G-I, II, III, IV (PA's, Inf., Post.)  [x] (64297) Provided manual therapy to mobilize LE, proximal hip and/or LS spine soft tissue/joints for the purpose of modulating pain, promoting relaxation,  increasing ROM, reducing/eliminating soft tissue swelling/inflammation/restriction, improving soft tissue extensibility and allowing for proper ROM for normal function with self care, mobility, lifting and ambulation.      Modalities:    Charges:  Timed Code Treatment Minutes: 50   Total Treatment Minutes: 50       [] EVAL (LOW) 75678 (typically 20 minutes face-to-face)  [] EVAL (MOD) 56855 (typically 30 minutes face-to-face)  [] EVAL (HIGH) 47865 (typically 45 minutes face-to-face)  [] RE-EVAL   [x] QK(81728) x  2   [] IONTO  [x] NMR (41532) x  1   [] VASO  [] Manual ([id number]) x      [] Other:  [] TA x      [] Mech Traction (10889)  [] ES(attended) (52916)      [] ES (un) (42296):     GOALS:   Patient stated goal: return to community activities     [x] Progressing: [] Met: [] Not Met: [] Adjusted     Therapist goals for Patient:   Short Term Goals: To be achieved in: 2-4 weeks  1. Independent in HEP and progression per patient tolerance, in order to prevent re-injury. [x] Progressing: [] Met: [] Not Met: [] Adjusted   2. Patient will have a decrease in pain to facilitate improvement in movement, function, and ADLs as indicated by Functional Deficits. [x] Progressing: [] Met: [] Not Met: [] Adjusted     Long Term Goals: To be achieved in: 12 weeks  1. Foto68/100to assist with reaching prior level of function. [x] Progressing: [] Met: [] Not Met: [] Adjusted  2. Patient will demonstrate increased AROM to =R to allow for proper joint functioning as indicated by patients Functional Deficits. [x] Progressing: [] Met: [] Not Met: [] Adjusted  3. Patient will demonstrate an increase in Strength to good proximal hip strength and control  in LE to allow for proper functional mobility as indicated by patients Functional Deficits. [x] Progressing: [] Met: [] Not Met: [] Adjusted  4. Patient will return to normal heel to toe gait in community  ascend descend 12 steps alternating  functional activities without increased symptoms or restriction. [x] Progressing: [] Met: [] Not Met: [] Adjusted  Overall Progression Towards Functional goals/ Treatment Progress Update:  [] Patient is progressing as expected towards functional goals listed. [x] Progression is slowed due to complexities/Impairments listed. [] Progression has been slowed due to co-morbidities.   [] Plan just implemented, too soon to assess goals progression <30days   [] Goals require adjustment due to lack of progress  [] Patient is not progressing as expected and requires additional follow up with physician  [] Other    Prognosis for POC: [x] Good [] Fair  [] Poor      Patient requires continued skilled intervention: [x] Yes  [] No    Treatment/Activity Tolerance:  [x] Patient able to complete treatment  [] Patient limited by fatigue  [] Patient limited by pain     [] Patient limited by other medical complications  [x] Other: Pt able to tolerate continued ? loading today with ? to 4 inch step for LSD. This is a significant improvement for pt. Pt will continue to benefit from continued functional strengthening in order to tolerate descending steps and sit to stand with no catching or ? pain. Patient Education:  ITB rolling with foam roller or rolling pin    Access Code: [email or web address]  URL: [email or web address]  Date: 11/16/2022  Prepared by: Jeremiah Garrison    Program Notes  ITB rolling x3-5 min    Exercises  Long Sitting Quad Set - 1 x daily - 7 x weekly - 10 reps - 10 hold  Active Straight Leg Raise with Quad Set - 1 x daily - 7 x weekly - 3 sets - 10 reps - 10 sec first rep hold  Sidelying Hip Abduction - 1 x daily - 7 x weekly - 3 sets - 10 reps  Clamshell with Resistance - 1 x daily - 7 x weekly - 3 sets - 10 reps  Bridge with Upper Back on Swiss Ball - 1 x daily - 7 x weekly - 3 sets - 10 reps  Heel Raise on Step - 1 x daily - 7 x weekly - 3 sets - 10 reps  Standing Terminal Knee Extension with Resistance - 1 x daily - 7 x weekly - 3 sets - 10 reps - 5 sec hold  Wall Squat with Ball between Knees - 1 x daily - 7 x weekly - 1 sets - 10 reps - 10 sec hold  Single Leg Stance on Foam Pad - 1 x daily - 7 x weekly - 1 sets - 3 reps - 30 sec hold  Seated Long Arc Quad - 1 x daily - 7 x weekly - 2 sets - 10 reps  Prone Quadriceps Stretch - 1 x daily - 7 x weekly - 1 sets - 2 reps - 30 sec hold  Seated Hamstring Stretch - 1 x daily - 7 x weekly - 1 sets - 3 reps - 30 sec hold  Standing Gastroc Stretch on Step - 1 x daily - 7 x weekly - 1 sets - 3 reps - 30 sec hold     PLAN: See eval  [x] Continue per plan of care [] Alter current plan (see comments above)  [] Plan of care initiated [] Hold pending MD visit [] Discharge      Electronically signed by:  Shima Ribeiro PT    Note: If patient does not return for scheduled/ recommended follow up visits, this note will serve as a discharge from care along with most recent update on progress.

## 2023-01-18 ENCOUNTER — HOSPITAL ENCOUNTER (OUTPATIENT)
Dept: PHYSICAL THERAPY | Age: 53
Setting detail: THERAPIES SERIES
Discharge: HOME | End: 2023-01-18
Payer: COMMERCIAL

## 2023-01-18 PROCEDURE — 97112 NEUROMUSCULAR REEDUCATION: CPT

## 2023-01-18 PROCEDURE — 97110 THERAPEUTIC EXERCISES: CPT

## 2023-01-18 PROCEDURE — 97140 MANUAL THERAPY 1/> REGIONS: CPT

## 2023-01-18 NOTE — FLOWSHEET NOTE
The [address] [place] [address],[address], [address] [facility] 3360 [facility] [address] [place] [address]  Phone: [phone]   Fax:     [phone]      Physical Therapy Daily Treatment Note  Date:  2023    Patient Name:  James Drake   \"JT\" :  1970  MRN: [id number]  Restrictions/Precautions:    Medical/Treatment Diagnosis Information:  Diagnosis: Primary osteoarthritis of left knee (M17.12)  Treatment Diagnosis: L knee pain U24.273  Insurance/Certification information:  PT Insurance Information: [employer]  Physician Information:   Casandra Goodman  Has the plan of care been signed (Y/N):        []  Yes  [x]  No     Date of Patient follow up with Physician: per md, mid Oct (not scheduled yet)      Is this a Progress Report:     []  Yes  [x]  No        If Yes:  Date Range for reporting period:  Beginning   Ending     Progress report will be due (10 Rx or 30 days whichever is less):        Recertification will be due (POC Duration  / 90 days whichever is less):          Visit # Insurance Allowable Auth Required   6  30 total 60 []  Yes []  No        Functional Scale: FOTO: 55/100    Date assessed:  10/19       Latex Allergy:  [x]NO      []YES  Preferred Language for Healthcare:   [x]English       []other:    Pain level:  0/10     SUBJECTIVE:  Pt reports his knee feels ok however yesterday he experienced a severe tightening in both gastroc muscles (medial). Pt is unsure why as he didn't do anything different. Pt's right calf still feels tight but not as tight as left. DOS: 9/15/22    OBJECTIVE:    ROM 0-135 °     Observation: portal sites healing well; pt amb with no crutches;  minimal skin irritation still noted, small area of broken skin (will hold on tape today)  : Trial with Breg lateral stabilization brace today. There was a bit of an adjustment period in the clinic however patella seemed to [facility] in place\" and brace felt good.  Pt to take home with him and continue trial  12/28: pt to keep brace and figured out why it wasn't feeling right    RESTRICTIONS/PRECAUTIONS: acitivty moderation     Exercises/Interventions: HEP updated 11/16  Exercise/Equipment Resistance/Repetitions Other comments   Stretching     Hamstring [id number]    gastroc [id number] slant   Inclined Calf     Hip Flexion     ITB     Groin                       SLR     Supine 3x10 4# ^ 1/4   Abduction 3x10 4# ^ 1/4   Adduction     Prone     SLR+     Side plank clam 3x10 ^ 11/23, BlackTB  Added 1/16, GTB   ^11/23, 3#, slow control    Isometrics     Quad sets Biofeedback 10\" hold x5 min @ 100% max (rest 20 sec in between) 1st ex with taping taping   Patellar Glides     Medial     Superior     Inferior          ROM     , 0-135 ° 22/38, discussed to re-introduce this ex prior to re-taping at home (11/21)   Hang Weights     Passive     Active     Weight Shift                       CKC     Added 9/19, NV   Wall sits with R foot on soccer ball 10\"x10 Changed 1/11, VC small ROM   4\" (single step), up with L, down with L/ up with R, down with R, added 10/3; HOLD 12/14   ^11/23, D/C   Squatting     Single leg dead lift 2x10 10# Added 1/16   Single leg squat with TRX 3x10 Added 1/11, VC for full knee ext over foot   CC TKE 5\"x30 ^ 12/28 CC level 7    NV ^ 11/16   Sidestepping  Monster walk fwd, bwd 3x15'  3x15' Grey loop, added 11/14   LSD  3x10 4\" ^1/16 (hold on for balance)   PRE     Extension  RANGE:   Flexion  RANGE:        Quantum machines     Leg press  ECC 3x10 130# changed 12/21, noted ? pain 1/11   Leg extension     Leg curl          Manual interventions     Rolling, STM and SASTM to L gastroc x8'               Therapeutic Exercise and NMR EXR  [x] (81433) Provided verbal/tactile cueing for activities related to strengthening, flexibility, endurance, ROM for improvements in LE, proximal hip, and core control with self care, mobility, lifting, ambulation.  [] (99967) Provided verbal/tactile cueing for activities related to improving balance, coordination, kinesthetic sense, posture, motor skill, proprioception  to assist with LE, proximal hip, and core control in self care, mobility, lifting, ambulation and eccentric single leg control. NMR and Therapeutic Activities:    [] (01358 or 55885) Provided verbal/tactile cueing for activities related to improving balance, coordination, kinesthetic sense, posture, motor skill, proprioception and motor activation to allow for proper function of core, proximal hip and LE with self care and ADLs  [] (83090) Gait Re-education- Provided training and instruction to the patient for proper LE, core and proximal hip recruitment and positioning and eccentric body weight control with ambulation re-education including up and down stairs     Home Exercise Program:    [x] (09101) Reviewed/Progressed HEP activities related to strengthening, flexibility, endurance, ROM of core, proximal hip and LE for functional self-care, mobility, lifting and ambulation/stair navigation   [] (81587)Reviewed/Progressed HEP activities related to improving balance, coordination, kinesthetic sense, posture, motor skill, proprioception of core, proximal hip and LE for self care, mobility, lifting, and ambulation/stair navigation      Manual Treatments:  PROM / STM / Oscillations-Mobs:  G-I, II, III, IV (PA's, Inf., Post.)  [x] (61083) Provided manual therapy to mobilize LE, proximal hip and/or LS spine soft tissue/joints for the purpose of modulating pain, promoting relaxation,  increasing ROM, reducing/eliminating soft tissue swelling/inflammation/restriction, improving soft tissue extensibility and allowing for proper ROM for normal function with self care, mobility, lifting and ambulation.      Modalities:    Charges:  Timed Code Treatment Minutes: 50   Total Treatment Minutes: 50       [] EVAL (LOW) 81722 (typically 20 minutes face-to-face)  [] EVAL (MOD) 73970 (typically 30 minutes face-to-face)  [] EVAL (HIGH) 19541 (typically 45 minutes face-to-face)  [] RE-EVAL   [x] OU(27477) x  2   [] IONTO  [x] NMR (18806) x  1   [] VASO  [] Manual (61025) x      [] Other:  [] TA x      [] Mech Traction (75842)  [] ES(attended) (90761)      [] ES (un) (96714):     GOALS:   Patient stated goal: return to community activities     [] Progressing: [x] Met: [] Not Met: [] Adjusted     Therapist goals for Patient:   Short Term Goals: To be achieved in: 2-4 weeks  1. Independent in HEP and progression per patient tolerance, in order to prevent re-injury. [] Progressing: [x] Met: [] Not Met: [] Adjusted   2. Patient will have a decrease in pain to facilitate improvement in movement, function, and ADLs as indicated by Functional Deficits. [] Progressing: [x] Met: [] Not Met: [] Adjusted     Long Term Goals: To be achieved in: 12 weeks  1. Foto68/100to assist with reaching prior level of function. [x] Progressing: [] Met: [] Not Met: [] Adjusted  2. Patient will demonstrate increased AROM to =R to allow for proper joint functioning as indicated by patients Functional Deficits. [] Progressing: [x] Met: [] Not Met: [] Adjusted  3. Patient will demonstrate an increase in Strength to good proximal hip strength and control  in LE to allow for proper functional mobility as indicated by patients Functional Deficits. [x] Progressing: [] Met: [] Not Met: [] Adjusted  4. Patient will return to normal heel to toe gait in community  ascend descend 12 steps alternating  functional activities without increased symptoms or restriction. [x] Progressing: [] Met: [] Not Met: [] Adjusted  Overall Progression Towards Functional goals/ Treatment Progress Update:  [] Patient is progressing as expected towards functional goals listed. [x] Progression is slowed due to complexities/Impairments listed. [] Progression has been slowed due to co-morbidities.   [] Plan just implemented, too soon to assess goals progression <30days   [] Goals require adjustment due to lack of progress  [] Patient is not progressing as expected and requires additional follow up with physician  [] Other    Prognosis for POC: [x] Good [] Fair  [] Poor      Patient requires continued skilled intervention: [x] Yes  [] No    Treatment/Activity Tolerance:  [x] Patient able to complete treatment  [] Patient limited by fatigue  [] Patient limited by pain     [] Patient limited by other medical complications  [x] Other: Pt able to tolerate continued ? loading today with ? to 4 inch step for LSD. This is a significant improvement for pt. Pt will continue to benefit from continued functional strengthening in order to tolerate descending steps and sit to stand with no catching or ? pain. Patient Education:  ITB rolling with foam roller or rolling pin    Access Code: [email or web address]  URL: [email or web address]  Date: 11/16/2022  Prepared by: Padilla Wright    Program Notes  ITB rolling x3-5 min    Exercises  Long Sitting Quad Set - 1 x daily - 7 x weekly - 10 reps - 10 hold  Active Straight Leg Raise with Quad Set - 1 x daily - 7 x weekly - 3 sets - 10 reps - 10 sec first rep hold  Sidelying Hip Abduction - 1 x daily - 7 x weekly - 3 sets - 10 reps  Clamshell with Resistance - 1 x daily - 7 x weekly - 3 sets - 10 reps  Bridge with Upper Back on Swiss Ball - 1 x daily - 7 x weekly - 3 sets - 10 reps  Heel Raise on Step - 1 x daily - 7 x weekly - 3 sets - 10 reps  Standing Terminal Knee Extension with Resistance - 1 x daily - 7 x weekly - 3 sets - 10 reps - 5 sec hold  Wall Squat with Ball between Knees - 1 x daily - 7 x weekly - 1 sets - 10 reps - 10 sec hold  Single Leg Stance on Foam Pad - 1 x daily - 7 x weekly - 1 sets - 3 reps - 30 sec hold  Seated Long Arc Quad - 1 x daily - 7 x weekly - 2 sets - 10 reps  Prone Quadriceps Stretch - 1 x daily - 7 x weekly - 1 sets - 2 reps - 30 sec hold  Seated Hamstring Stretch - 1 x daily - 7 x weekly - 1 sets - 3 reps - 30 sec hold  Standing Gastroc Stretch on Step - 1 x daily - 7 x weekly - 1 sets - 3 reps - 30 sec hold     PLAN: See eval  [x] Continue per plan of care [] Alter current plan (see comments above)  [] Plan of care initiated [] Hold pending MD visit [] Discharge      Electronically signed by:  Alvaro Multani PT    Note: If patient does not return for scheduled/ recommended follow up visits, this note will serve as a discharge from care along with most recent update on progress.

## 2023-01-23 ENCOUNTER — APPOINTMENT (OUTPATIENT)
Dept: PHYSICAL THERAPY | Age: 53
End: 2023-01-23
Payer: COMMERCIAL

## 2023-01-25 ENCOUNTER — HOSPITAL ENCOUNTER (OUTPATIENT)
Dept: PHYSICAL THERAPY | Age: 53
Setting detail: THERAPIES SERIES
Discharge: HOME | End: 2023-01-25
Payer: COMMERCIAL

## 2023-01-25 PROCEDURE — 97016 VASOPNEUMATIC DEVICE THERAPY: CPT

## 2023-01-25 PROCEDURE — 97110 THERAPEUTIC EXERCISES: CPT

## 2023-01-25 PROCEDURE — 97112 NEUROMUSCULAR REEDUCATION: CPT

## 2023-01-25 NOTE — FLOWSHEET NOTE
The [address] [place] [address],[address], 800 [facility] 3360 [facility], [address] [place] [address]  Phone: [phone]   Fax:     [phone]      Physical Therapy Daily Treatment Note  Date:  2023    Patient Name:  Aura Millard   \"JT\" :  1970  MRN: [id number]  Restrictions/Precautions:    Medical/Treatment Diagnosis Information:  Diagnosis: Primary osteoarthritis of left knee (M17.12)  Treatment Diagnosis: L knee pain W88.264  Insurance/Certification information:  PT Insurance Information: [facility]  Physician Information:   Molly Briseno  Has the plan of care been signed (Y/N):        []  Yes  [x]  No     Date of Patient follow up with Physician: per md, mid Oct (not scheduled yet)      Is this a Progress Report:     []  Yes  [x]  No        If Yes:  Date Range for reporting period:  Beginning   Ending     Progress report will be due (10 Rx or 30 days whichever is less): 94       Recertification will be due (POC Duration  / 90 days whichever is less):          Visit # Insurance Allowable Auth Required   7   total 60 []  Yes []  No        Functional Scale: FOTO: 55/100    Date assessed:  10/19       Latex Allergy:  [x]NO      []YES  Preferred Language for Healthcare:   [x]English       []other:    Pain level:  0/10     SUBJECTIVE:  Pt reports his knee feels ok however yesterday he experienced a severe tightening in both gastroc muscles (medial). Pt is unsure why as he didn't do anything different. Pt's right calf still feels tight but not as tight as left. DOS: 9/15/22    OBJECTIVE:    ROM 0-135 °     Observation: portal sites healing well; pt amb with no crutches;  minimal skin irritation still noted, small area of broken skin (will hold on tape today)  : Trial with Breg lateral stabilization brace today. There was a bit of an adjustment period in the clinic however patella seemed to [facility] in place\" and brace felt good.  Pt to take home with him and continue trial  12/28: pt to keep brace and figured out why it wasn't feeling right    RESTRICTIONS/PRECAUTIONS: acitivty moderation     Exercises/Interventions: HEP updated 11/16  Exercise/Equipment Resistance/Repetitions Other comments   Stretching     Hamstring [id number]    gastroc [id number] slant   Inclined Calf     Hip Flexion     ITB     Groin                       SLR     Supine 3x10 4# ^ 1/4   Abduction 3x10 4# ^ 1/4   Adduction     Prone     SLR+     Side plank clam 3x10 ^ 11/23, BlackTB  Added 1/16, GTB   ^11/23, 3#, slow control    Isometrics     Quad sets Biofeedback 10\" hold x5 min @ 100% max (rest 20 sec in between) 1st ex with taping taping   Patellar Glides     Medial     Superior     Inferior          ROM     , 0-135 ° 22/38, discussed to re-introduce this ex prior to re-taping at home (11/21)   Hang Weights     Passive     Active     Weight Shift                       CKC     Added 9/19, NV   Wall sits with R foot on soccer ball 10\"x10 Changed 1/11, VC small ROM   4\" (single step), up with L, down with L/ up with R, down with R, added 10/3; HOLD 12/14   ^11/23, D/C   Squatting     Single leg dead lift 2x10 10# Added 1/16   Single leg squat with TRX 3x10 Added 1/11, VC for full knee ext over foot   CC TKE 5\"x30 ^ 12/28 CC level 7   Bridges SB 10\"x20  ^ 11/16   Sidestepping  Monster walk fwd, bwd 3x15'  3x15' Grey loop, added 11/14   LSD  3x10 4\" ^1/16 (hold on for balance)   PRE     Extension  RANGE:   Flexion  RANGE:        Quantum machines     Leg press  ECC 3x10 130# changed 12/21, noted ? pain 1/11   Leg extension     Leg curl          Manual interventions     Rolling, STM and SASTM to L gastroc x8'               Therapeutic Exercise and NMR EXR  [x] (03345) Provided verbal/tactile cueing for activities related to strengthening, flexibility, endurance, ROM for improvements in LE, proximal hip, and core control with self care, mobility, lifting, ambulation.  [] (28178) Provided verbal/tactile cueing for activities related to improving balance, coordination, kinesthetic sense, posture, motor skill, proprioception  to assist with LE, proximal hip, and core control in self care, mobility, lifting, ambulation and eccentric single leg control. NMR and Therapeutic Activities:    [] (58940 or 83249) Provided verbal/tactile cueing for activities related to improving balance, coordination, kinesthetic sense, posture, motor skill, proprioception and motor activation to allow for proper function of core, proximal hip and LE with self care and ADLs  [] (08587) Gait Re-education- Provided training and instruction to the patient for proper LE, core and proximal hip recruitment and positioning and eccentric body weight control with ambulation re-education including up and down stairs     Home Exercise Program:    [x] (71504) Reviewed/Progressed HEP activities related to strengthening, flexibility, endurance, ROM of core, proximal hip and LE for functional self-care, mobility, lifting and ambulation/stair navigation   [] (74008)Reviewed/Progressed HEP activities related to improving balance, coordination, kinesthetic sense, posture, motor skill, proprioception of core, proximal hip and LE for self care, mobility, lifting, and ambulation/stair navigation      Manual Treatments:  PROM / STM / Oscillations-Mobs:  G-I, II, III, IV (PA's, Inf., Post.)  [x] (10550) Provided manual therapy to mobilize LE, proximal hip and/or LS spine soft tissue/joints for the purpose of modulating pain, promoting relaxation,  increasing ROM, reducing/eliminating soft tissue swelling/inflammation/restriction, improving soft tissue extensibility and allowing for proper ROM for normal function with self care, mobility, lifting and ambulation.      Modalities:    Charges:  Timed Code Treatment Minutes: 50   Total Treatment Minutes: 50       [] EVAL (LOW) 36781 (typically 20 minutes face-to-face)  [] EVAL (MOD) 65041 (typically 30 minutes face-to-face)  [] EVAL (HIGH) 56788 (typically 45 minutes face-to-face)  [] RE-EVAL   [x] BH(88360) x  2   [] IONTO  [x] NMR (01362) x  1   [] VASO  [] Manual (96084) x      [] Other:  [] TA x      [] Mech Traction (97848)  [] ES(attended) (40666)      [] ES (un) (67248):     GOALS:   Patient stated goal: return to community activities     [] Progressing: [x] Met: [] Not Met: [] Adjusted     Therapist goals for Patient:   Short Term Goals: To be achieved in: 2-4 weeks  1. Independent in HEP and progression per patient tolerance, in order to prevent re-injury. [] Progressing: [x] Met: [] Not Met: [] Adjusted   2. Patient will have a decrease in pain to facilitate improvement in movement, function, and ADLs as indicated by Functional Deficits. [] Progressing: [x] Met: [] Not Met: [] Adjusted     Long Term Goals: To be achieved in: 12 weeks  1. Foto68/100to assist with reaching prior level of function. [x] Progressing: [] Met: [] Not Met: [] Adjusted  2. Patient will demonstrate increased AROM to =R to allow for proper joint functioning as indicated by patients Functional Deficits. [] Progressing: [x] Met: [] Not Met: [] Adjusted  3. Patient will demonstrate an increase in Strength to good proximal hip strength and control  in LE to allow for proper functional mobility as indicated by patients Functional Deficits. [x] Progressing: [] Met: [] Not Met: [] Adjusted  4. Patient will return to normal heel to toe gait in community  ascend descend 12 steps alternating  functional activities without increased symptoms or restriction. [x] Progressing: [] Met: [] Not Met: [] Adjusted  Overall Progression Towards Functional goals/ Treatment Progress Update:  [] Patient is progressing as expected towards functional goals listed. [x] Progression is slowed due to complexities/Impairments listed. [] Progression has been slowed due to co-morbidities.   [] Plan just implemented, too soon to assess goals progression <30days   [] Goals require adjustment due to lack of progress  [] Patient is not progressing as expected and requires additional follow up with physician  [] Other    Prognosis for POC: [x] Good [] Fair  [] Poor      Patient requires continued skilled intervention: [x] Yes  [] No    Treatment/Activity Tolerance:  [x] Patient able to complete treatment  [] Patient limited by fatigue  [] Patient limited by pain     [] Patient limited by other medical complications  [x] Other: Pt able to tolerate continued ? loading today with ? to 4 inch step for LSD. This is a significant improvement for pt. Pt will continue to benefit from continued functional strengthening in order to tolerate descending steps and sit to stand with no catching or ? pain. Patient Education:  ITB rolling with foam roller or rolling pin    Access Code: [email or web address]  URL: [email or web address]  Date: 11/16/2022  Prepared by: Nicole Lombardi    Program Notes  ITB rolling x3-5 min    Exercises  Long Sitting Quad Set - 1 x daily - 7 x weekly - 10 reps - 10 hold  Active Straight Leg Raise with Quad Set - 1 x daily - 7 x weekly - 3 sets - 10 reps - 10 sec first rep hold  Sidelying Hip Abduction - 1 x daily - 7 x weekly - 3 sets - 10 reps  Clamshell with Resistance - 1 x daily - 7 x weekly - 3 sets - 10 reps  Bridge with Upper Back on Swiss Ball - 1 x daily - 7 x weekly - 3 sets - 10 reps  Heel Raise on Step - 1 x daily - 7 x weekly - 3 sets - 10 reps  Standing Terminal Knee Extension with Resistance - 1 x daily - 7 x weekly - 3 sets - 10 reps - 5 sec hold  Wall Squat with Ball between Knees - 1 x daily - 7 x weekly - 1 sets - 10 reps - 10 sec hold  Single Leg Stance on Foam Pad - 1 x daily - 7 x weekly - 1 sets - 3 reps - 30 sec hold  Seated Long Arc Quad - 1 x daily - 7 x weekly - 2 sets - 10 reps  Prone Quadriceps Stretch - 1 x daily - 7 x weekly - 1 sets - 2 reps - 30 sec hold  Seated Hamstring Stretch - 1 x daily - 7 x weekly - 1 sets - 3 reps - 30 sec hold  Standing Gastroc Stretch on Step - 1 x daily - 7 x weekly - 1 sets - 3 reps - 30 sec hold     PLAN: See eval  [x] Continue per plan of care [] Alter current plan (see comments above)  [] Plan of care initiated [] Hold pending MD visit [] Discharge      Electronically signed by:  Sepideh Rodriguez PT    Note: If patient does not return for scheduled/ recommended follow up visits, this note will serve as a discharge from care along with most recent update on progress.

## 2023-01-30 ENCOUNTER — APPOINTMENT (OUTPATIENT)
Dept: PHYSICAL THERAPY | Age: 53
End: 2023-01-30
Payer: COMMERCIAL

## 2023-01-30 PROCEDURE — 97112 NEUROMUSCULAR REEDUCATION: CPT

## 2023-01-30 PROCEDURE — 97110 THERAPEUTIC EXERCISES: CPT

## 2023-01-30 PROCEDURE — 97016 VASOPNEUMATIC DEVICE THERAPY: CPT

## 2023-01-30 NOTE — FLOWSHEET NOTE
The [address] [place], [place] [address], 800 [facility] 3360 [facility], [address] [place] [address]  Phone: [phone]   Fax:     [phone]      Physical Therapy Daily Treatment Note  Date:  2023    Patient Name:  Yasir Carpenter   \"JT\" :  1970  MRN: [id number]  Restrictions/Precautions:    Medical/Treatment Diagnosis Information:  Diagnosis: Primary osteoarthritis of left knee (M17.12)  Treatment Diagnosis: L knee pain I61.485  Insurance/Certification information:  PT Insurance Information: [facility]  Physician Information:   Colleen Lafleur  Has the plan of care been signed (Y/N):        []  Yes  [x]  No     Date of Patient follow up with Physician: per md, mid Oct (not scheduled yet)      Is this a Progress Report:     []  Yes  [x]  No        If Yes:  Date Range for reporting period:  Beginning   Ending     Progress report will be due (10 Rx or 30 days whichever is less):        Recertification will be due (POC Duration  / 90 days whichever is less):          Visit # Insurance Allowable Auth Required   7   total 60 []  Yes []  No        Functional Scale: FOTO: 55/100    Date assessed:  10/19       Latex Allergy:  [x]NO      []YES  Preferred Language for Healthcare:   [x]English       []other:    Pain level:  0/10     SUBJECTIVE:  Pt reports he noticed ? soreness/aching on Saturday. He wore the brace and that helped. Pt did continue to wear the brace for a half day on  however aching mostly dissipated. Pt feels he can support a little more weight going down the steps while wearing the brace. DOS: 9/15/22    OBJECTIVE:    ROM 0-135 °     Observation: portal sites healing well; pt amb with no crutches;  minimal skin irritation still noted, small area of broken skin (will hold on tape today)  : Trial with Breg lateral stabilization brace today.  There was a bit of an adjustment period in the clinic however patella seemed to [facility] in place\" and brace felt good.  Pt to take home with him and continue trial  12/28: pt to keep brace and figured out why it wasn't feeling right    RESTRICTIONS/PRECAUTIONS: acitivty moderation     Exercises/Interventions: HEP updated 11/16  Exercise/Equipment Resistance/Repetitions Other comments   Stretching     Hamstring [id number]    gastroc [id number] slant   Inclined Calf     Hip Flexion     ITB     Groin                       SLR     Supine 3x10 4# ^ 1/4   Abduction 3x10 4# ^ 1/4   Adduction     Prone     SLR+     Side plank clam 3x10 ^ 11/23, BlackTB  ^1/30, silverTB   ^11/23, 3#, slow control    Isometrics     Quad sets Biofeedback 10\" hold x5 min @ 100% max (rest 10 sec in between) 1st ex with taping taping   Patellar Glides     Medial     Superior     Inferior          ROM     , 0-135 ° 22/38, discussed to re-introduce this ex prior to re-taping at home (11/21)   Hang Weights     Passive     Active     Weight Shift                       CKC     Added 9/19, NV   Wall sits with R foot on soccer ball 10\"x10 Changed 1/11, VC small ROM   4\" (single step), up with L, down with L/ up with R, down with R, added 10/3; HOLD 12/14   ^11/23, D/C   Squatting     Single leg dead lift X10 +5 10# Added 1/16, significant challenge noted   Single leg squat with TRX 3x10 Added 1/11, VC for full knee ext over foot   CC TKE 5\"x30 ^ 12/28 CC level 7    NV ^ 11/16   Sidestepping  Monster walk fwd, bwd 3x15'  3x15' Grey loop, added 11/14   LSD  3x10, significant challenge noted 6\" ^1/30 (hold on for balance)   PRE     Extension  RANGE:   Flexion  RANGE:        Quantum machines     Leg press  ECC 3x10 130# changed 12/21, noted ? pain 1/11   Leg extension     Leg curl          Manual interventions                   Therapeutic Exercise and NMR EXR  [x] (34383) Provided verbal/tactile cueing for activities related to strengthening, flexibility, endurance, ROM for improvements in LE, proximal hip, and core control with self care, mobility, lifting, ambulation.  [] (17584) Provided verbal/tactile cueing for activities related to improving balance, coordination, kinesthetic sense, posture, motor skill, proprioception  to assist with LE, proximal hip, and core control in self care, mobility, lifting, ambulation and eccentric single leg control. NMR and Therapeutic Activities:    [] (91624 or 61603) Provided verbal/tactile cueing for activities related to improving balance, coordination, kinesthetic sense, posture, motor skill, proprioception and motor activation to allow for proper function of core, proximal hip and LE with self care and ADLs  [] (98887) Gait Re-education- Provided training and instruction to the patient for proper LE, core and proximal hip recruitment and positioning and eccentric body weight control with ambulation re-education including up and down stairs     Home Exercise Program:    [x] (97121) Reviewed/Progressed HEP activities related to strengthening, flexibility, endurance, ROM of core, proximal hip and LE for functional self-care, mobility, lifting and ambulation/stair navigation   [] (70052)Reviewed/Progressed HEP activities related to improving balance, coordination, kinesthetic sense, posture, motor skill, proprioception of core, proximal hip and LE for self care, mobility, lifting, and ambulation/stair navigation      Manual Treatments:  PROM / STM / Oscillations-Mobs:  G-I, II, III, IV (PA's, Inf., Post.)  [x] (58382) Provided manual therapy to mobilize LE, proximal hip and/or LS spine soft tissue/joints for the purpose of modulating pain, promoting relaxation,  increasing ROM, reducing/eliminating soft tissue swelling/inflammation/restriction, improving soft tissue extensibility and allowing for proper ROM for normal function with self care, mobility, lifting and ambulation.      Modalities:    Charges:  Timed Code Treatment Minutes: 50   Total Treatment Minutes: 65       [] EVAL (LOW) 74864 (typically 20 minutes face-to-face)  [] EVAL (MOD) 63098 (typically 30 minutes face-to-face)  [] EVAL (HIGH) 44981 (typically 45 minutes face-to-face)  [] RE-EVAL   [x] XB(14418) x  2   [] IONTO  [x] NMR (54398) x  1   [x] VASO  [] Manual (96713) x      [] Other:  [] TA x      [] Mech Traction (27145)  [] ES(attended) (11744)      [] ES (un) (11602):     GOALS:   Patient stated goal: return to community activities     [] Progressing: [x] Met: [] Not Met: [] Adjusted     Therapist goals for Patient:   Short Term Goals: To be achieved in: 2-4 weeks  1. Independent in HEP and progression per patient tolerance, in order to prevent re-injury. [] Progressing: [x] Met: [] Not Met: [] Adjusted   2. Patient will have a decrease in pain to facilitate improvement in movement, function, and ADLs as indicated by Functional Deficits. [] Progressing: [x] Met: [] Not Met: [] Adjusted     Long Term Goals: To be achieved in: 12 weeks  1. Foto68/100to assist with reaching prior level of function. [x] Progressing: [] Met: [] Not Met: [] Adjusted  2. Patient will demonstrate increased AROM to =R to allow for proper joint functioning as indicated by patients Functional Deficits. [] Progressing: [x] Met: [] Not Met: [] Adjusted  3. Patient will demonstrate an increase in Strength to good proximal hip strength and control  in LE to allow for proper functional mobility as indicated by patients Functional Deficits. [x] Progressing: [] Met: [] Not Met: [] Adjusted  4. Patient will return to normal heel to toe gait in community  ascend descend 12 steps alternating  functional activities without increased symptoms or restriction. [x] Progressing: [] Met: [] Not Met: [] Adjusted  Overall Progression Towards Functional goals/ Treatment Progress Update:  [] Patient is progressing as expected towards functional goals listed. [x] Progression is slowed due to complexities/Impairments listed. [] Progression has been slowed due to co-morbidities.   [] Plan just implemented, too soon to assess goals progression <30days   [] Goals require adjustment due to lack of progress  [] Patient is not progressing as expected and requires additional follow up with physician  [] Other    Prognosis for POC: [x] Good [] Fair  [] Poor      Patient requires continued skilled intervention: [x] Yes  [] No    Treatment/Activity Tolerance:  [x] Patient able to complete treatment  [] Patient limited by fatigue  [] Patient limited by pain     [] Patient limited by other medical complications  [x] Other: Pt able to tolerate continued ? loading today with ? to 6 inch step for LSD. This is a significant improvement for pt. Pt will continue to benefit from continued functional strengthening in order to tolerate descending steps and sit to stand with no catching or ? pain. Patient Education:  ITB rolling with foam roller or rolling pin    Access Code: [email or web address]  URL: [email or web address]  Date: 11/16/2022  Prepared by: Aspen Stage    Program Notes  ITB rolling x3-5 min    Exercises  Long Sitting Quad Set - 1 x daily - 7 x weekly - 10 reps - 10 hold  Active Straight Leg Raise with Quad Set - 1 x daily - 7 x weekly - 3 sets - 10 reps - 10 sec first rep hold  Sidelying Hip Abduction - 1 x daily - 7 x weekly - 3 sets - 10 reps  Clamshell with Resistance - 1 x daily - 7 x weekly - 3 sets - 10 reps  Bridge with Upper Back on Swiss Ball - 1 x daily - 7 x weekly - 3 sets - 10 reps  Heel Raise on Step - 1 x daily - 7 x weekly - 3 sets - 10 reps  Standing Terminal Knee Extension with Resistance - 1 x daily - 7 x weekly - 3 sets - 10 reps - 5 sec hold  Wall Squat with Ball between Knees - 1 x daily - 7 x weekly - 1 sets - 10 reps - 10 sec hold  Single Leg Stance on Foam Pad - 1 x daily - 7 x weekly - 1 sets - 3 reps - 30 sec hold  Seated Long Arc Quad - 1 x daily - 7 x weekly - 2 sets - 10 reps  Prone Quadriceps Stretch - 1 x daily - 7 x weekly - 1 sets - 2 reps - 30 sec hold  Seated Hamstring Stretch - 1 x daily - 7 x weekly - 1 sets - 3 reps - 30 sec hold  Standing Gastroc Stretch on Step - 1 x daily - 7 x weekly - 1 sets - 3 reps - 30 sec hold     PLAN: See eval  [x] Continue per plan of care [] Alter current plan (see comments above)  [] Plan of care initiated [] Hold pending MD visit [] Discharge      Electronically signed by:  Devendra Orlando PT    Note: If patient does not return for scheduled/ recommended follow up visits, this note will serve as a discharge from care along with most recent update on progress.

## 2023-02-01 ENCOUNTER — HOSPITAL ENCOUNTER (OUTPATIENT)
Dept: PHYSICAL THERAPY | Age: 53
Setting detail: THERAPIES SERIES
Discharge: HOME | End: 2023-02-01
Payer: COMMERCIAL

## 2023-02-01 PROCEDURE — 97112 NEUROMUSCULAR REEDUCATION: CPT

## 2023-02-01 PROCEDURE — 97016 VASOPNEUMATIC DEVICE THERAPY: CPT

## 2023-02-01 PROCEDURE — 97110 THERAPEUTIC EXERCISES: CPT

## 2023-02-01 NOTE — FLOWSHEET NOTE
The [address] [place] [address] 200, 154 [facility] 3360 [facility], [address] [place] [address]  Phone: [phone]   Fax:     [phone]      Physical Therapy Daily Treatment Note  Date:  2023    Patient Name:  Arnulfo Gallo   \"JT\" :  1970  MRN: [id number]  Restrictions/Precautions:    Medical/Treatment Diagnosis Information:  Diagnosis: Primary osteoarthritis of left knee (M17.12)  Treatment Diagnosis: L knee pain M43.865  Insurance/Certification information:  PT Insurance Information: [facility]  Physician Information:   Khai Russo  Has the plan of care been signed (Y/N):        []  Yes  [x]  No     Date of Patient follow up with Physician: per md, mid Oct (not scheduled yet)      Is this a Progress Report:     []  Yes  [x]  No        If Yes:  Date Range for reporting period:  Beginning   Ending     Progress report will be due (10 Rx or 30 days whichever is less):        Recertification will be due (POC Duration  / 90 days whichever is less):          Visit # Insurance Allowable Auth Required   8  32 total 60 []  Yes []  No        Functional Scale: FOTO: 55/100    Date assessed:  10/19       Latex Allergy:  [x]NO      []YES  Preferred Language for Healthcare:   [x]English       []other:    Pain level:  0/10     SUBJECTIVE:  Pt reports nothing new since last visit. DOS: 9/15/22    OBJECTIVE:    ROM 0-135 °     Observation: portal sites healing well; pt amb with no crutches;  minimal skin irritation still noted, small area of broken skin (will hold on tape today)  : Trial with Breg lateral stabilization brace today. There was a bit of an adjustment period in the clinic however patella seemed to ARTEMIO [facility] in place\" and brace felt good.  Pt to take home with him and continue trial  : pt to keep brace and figured out why it wasn't feeling right    RESTRICTIONS/PRECAUTIONS: acitivty moderation     Exercises/Interventions: HEP updated 11/16  Exercise/Equipment Resistance/Repetitions Other comments   Stretching     Hamstring [id number]    gastroc [id number] slant   Inclined Calf     Hip Flexion     ITB     Groin                       SLR     Supine 3x10 4# ^ 1/4   Abduction 3x10 4# ^ 1/4   Adduction     Prone     SLR+     Side plank clam 3x10 ^ 11/23, BlackTB  ^1/30, silverTB   ^11/23, 3#, slow control    Isometrics     Quad sets Biofeedback 10\" hold x5 min @ 90% max (rest 10 sec in between) + SLR with 2# 1st ex (no taping 2/1)   Patellar Glides     Medial     Superior     Inferior          ROM     , 0-135 ° 22/38, discussed to re-introduce this ex prior to re-taping at home (11/21)   Hang Weights     Passive     Active     Weight Shift                       CKC     Added 9/19, NV   Wall sits with R foot on soccer ball 10\"x10 Changed 1/11, VC small ROM   4\" (single step), up with L, down with L/ up with R, down with R, added 10/3; HOLD 12/14   ^11/23, D/C   Squatting     Single leg dead lift X10 +6 10# Added 1/16, significant challenge noted   Single leg squat with TRX 3x10 Added 1/11, VC for full knee ext over foot   CC TKE 5\"x30 ^ 2/1 CC level 8    NV ^ 11/16   Sidestepping  Monster walk fwd, bwd 3x15'  3x15' Grey loop, added 11/14   LSD  3x10, significant challenge noted 6\" ^1/30 (hold on for balance)   PRE     Extension  RANGE:   Flexion  RANGE:        Quantum machines     Leg press  ECC 3x10 140# ^2/1   Leg extension     Leg curl          Manual interventions                   Therapeutic Exercise and NMR EXR  [x] (50362) Provided verbal/tactile cueing for activities related to strengthening, flexibility, endurance, ROM for improvements in LE, proximal hip, and core control with self care, mobility, lifting, ambulation.  [] (11497) Provided verbal/tactile cueing for activities related to improving balance, coordination, kinesthetic sense, posture, motor skill, proprioception  to assist with LE, proximal hip, and core control in self care, mobility, lifting, ambulation and eccentric single leg control. NMR and Therapeutic Activities:    [] (75576 or 01356) Provided verbal/tactile cueing for activities related to improving balance, coordination, kinesthetic sense, posture, motor skill, proprioception and motor activation to allow for proper function of core, proximal hip and LE with self care and ADLs  [] (31829) Gait Re-education- Provided training and instruction to the patient for proper LE, core and proximal hip recruitment and positioning and eccentric body weight control with ambulation re-education including up and down stairs     Home Exercise Program:    [x] (33007) Reviewed/Progressed HEP activities related to strengthening, flexibility, endurance, ROM of core, proximal hip and LE for functional self-care, mobility, lifting and ambulation/stair navigation   [] (55185)Reviewed/Progressed HEP activities related to improving balance, coordination, kinesthetic sense, posture, motor skill, proprioception of core, proximal hip and LE for self care, mobility, lifting, and ambulation/stair navigation      Manual Treatments:  PROM / STM / Oscillations-Mobs:  G-I, II, III, IV (PA's, Inf., Post.)  [x] (99444) Provided manual therapy to mobilize LE, proximal hip and/or LS spine soft tissue/joints for the purpose of modulating pain, promoting relaxation,  increasing ROM, reducing/eliminating soft tissue swelling/inflammation/restriction, improving soft tissue extensibility and allowing for proper ROM for normal function with self care, mobility, lifting and ambulation. Modalities:  Vasopneumatic Gameready treatment for effusion of L knee and cryotherapy for 10 minutes at medium pressure.     Charges:  Timed Code Treatment Minutes: 50   Total Treatment Minutes: 65       [] EVAL (LOW) 77633 (typically 20 minutes face-to-face)  [] EVAL (MOD) 31160 (typically 30 minutes face-to-face)  [] EVAL (HIGH) 38032 (typically 45 minutes face-to-face)  [] RE-EVAL   [x] IH(46095) x  2   [] IONTO  [x] NMR (49273) x  1   [x] VASO  [] Manual (25014) x      [] Other:  [] TA x      [] Mech Traction (72513)  [] ES(attended) (67020)      [] ES (un) (44035):     GOALS:   Patient stated goal: return to community activities     [] Progressing: [x] Met: [] Not Met: [] Adjusted     Therapist goals for Patient:   Short Term Goals: To be achieved in: 2-4 weeks  1. Independent in HEP and progression per patient tolerance, in order to prevent re-injury. [] Progressing: [x] Met: [] Not Met: [] Adjusted   2. Patient will have a decrease in pain to facilitate improvement in movement, function, and ADLs as indicated by Functional Deficits. [] Progressing: [x] Met: [] Not Met: [] Adjusted     Long Term Goals: To be achieved in: 12 weeks  1. Foto68/100to assist with reaching prior level of function. [x] Progressing: [] Met: [] Not Met: [] Adjusted  2. Patient will demonstrate increased AROM to =R to allow for proper joint functioning as indicated by patients Functional Deficits. [] Progressing: [x] Met: [] Not Met: [] Adjusted  3. Patient will demonstrate an increase in Strength to good proximal hip strength and control  in LE to allow for proper functional mobility as indicated by patients Functional Deficits. [x] Progressing: [] Met: [] Not Met: [] Adjusted  4. Patient will return to normal heel to toe gait in community  ascend descend 12 steps alternating  functional activities without increased symptoms or restriction. [x] Progressing: [] Met: [] Not Met: [] Adjusted  Overall Progression Towards Functional goals/ Treatment Progress Update:  [] Patient is progressing as expected towards functional goals listed. [x] Progression is slowed due to complexities/Impairments listed. [] Progression has been slowed due to co-morbidities.   [] Plan just implemented, too soon to assess goals progression <30days   [] Goals require adjustment due to lack of progress  [] Patient is not progressing as expected and requires additional follow up with physician  [] Other    Prognosis for POC: [x] Good [] Fair  [] Poor      Patient requires continued skilled intervention: [x] Yes  [] No    Treatment/Activity Tolerance:  [x] Patient able to complete treatment  [] Patient limited by fatigue  [] Patient limited by pain     [] Patient limited by other medical complications  [x] Other: Pt cotninues to exhibit improving quad strength and activation and improved loading in weight bearing. Patient Education:  ITB rolling with foam roller or rolling pin    Access Code: [email or web address]  URL: [employer]/  Date: 11/16/2022  Prepared by: Beronica Byrd    Program Notes  ITB rolling x3-5 min    Exercises  Long Sitting Quad Set - 1 x daily - 7 x weekly - 10 reps - 10 hold  Active Straight Leg Raise with Quad Set - 1 x daily - 7 x weekly - 3 sets - 10 reps - 10 sec first rep hold  Sidelying Hip Abduction - 1 x daily - 7 x weekly - 3 sets - 10 reps  Clamshell with Resistance - 1 x daily - 7 x weekly - 3 sets - 10 reps  Bridge with Upper Back on Swiss Ball - 1 x daily - 7 x weekly - 3 sets - 10 reps  Heel Raise on Step - 1 x daily - 7 x weekly - 3 sets - 10 reps  Standing Terminal Knee Extension with Resistance - 1 x daily - 7 x weekly - 3 sets - 10 reps - 5 sec hold  Wall Squat with Ball between Knees - 1 x daily - 7 x weekly - 1 sets - 10 reps - 10 sec hold  Single Leg Stance on Foam Pad - 1 x daily - 7 x weekly - 1 sets - 3 reps - 30 sec hold  Seated Long Arc Quad - 1 x daily - 7 x weekly - 2 sets - 10 reps  Prone Quadriceps Stretch - 1 x daily - 7 x weekly - 1 sets - 2 reps - 30 sec hold  Seated Hamstring Stretch - 1 x daily - 7 x weekly - 1 sets - 3 reps - 30 sec hold  Standing Gastroc Stretch on Step - 1 x daily - 7 x weekly - 1 sets - 3 reps - 30 sec hold     PLAN: See eval  [x] Continue per plan of care [] Alter current plan (see comments above)  [] Plan of care initiated [] Hold pending MD visit [] Discharge      Electronically signed by:  Js Doty PT    Note: If patient does not return for scheduled/ recommended follow up visits, this note will serve as a discharge from care along with most recent update on progress.

## 2023-02-06 ENCOUNTER — HOSPITAL ENCOUNTER (OUTPATIENT)
Dept: PHYSICAL THERAPY | Age: 53
Setting detail: THERAPIES SERIES
Discharge: HOME | End: 2023-02-06
Payer: COMMERCIAL

## 2023-02-06 PROCEDURE — 97110 THERAPEUTIC EXERCISES: CPT

## 2023-02-06 PROCEDURE — 97112 NEUROMUSCULAR REEDUCATION: CPT

## 2023-02-06 PROCEDURE — 97016 VASOPNEUMATIC DEVICE THERAPY: CPT

## 2023-02-06 NOTE — FLOWSHEET NOTE
The [address] [place], [place] [address], 792 [facility] 3360 [facility], [address] [place] [address]  Phone: [phone]   Fax:     [phone]      Physical Therapy Daily Treatment Note  Date:  2023    Patient Name:  Nayeli Gifford   \"JT\" :  1970  MRN: [id number]  Restrictions/Precautions:    Medical/Treatment Diagnosis Information:  Diagnosis: Primary osteoarthritis of left knee (M17.12)  Treatment Diagnosis: L knee pain T16.551  Insurance/Certification information:  PT Insurance Information: [facility]  Physician Information:   Michelle Code  Has the plan of care been signed (Y/N):        []  Yes  [x]  No     Date of Patient follow up with Physician: per md, mid Oct (not scheduled yet)      Is this a Progress Report:     []  Yes  [x]  No        If Yes:  Date Range for reporting period:  Beginning   Ending     Progress report will be due (10 Rx or 30 days whichever is less):        Recertification will be due (POC Duration  / 90 days whichever is less):          Visit # Insurance Allowable Auth Required   9  33 total 60 []  Yes []  No        Functional Scale: FOTO: 55/100    Date assessed:  10/19       Latex Allergy:  [x]NO      []YES  Preferred Language for Healthcare:   [x]English       []other:    Pain level:  0/10     SUBJECTIVE:  Pt reports nothing new since last visit. DOS: 9/15/22    OBJECTIVE:    ROM 0-135 °     Observation: portal sites healing well; pt amb with no crutches;  minimal skin irritation still noted, small area of broken skin (will hold on tape today)  : Trial with Breg lateral stabilization brace today. There was a bit of an adjustment period in the clinic however patella seemed to ARTEMIO [facility] in place\" and brace felt good.  Pt to take home with him and continue trial  : pt to keep brace and figured out why it wasn't feeling right    RESTRICTIONS/PRECAUTIONS: acitivty moderation     Exercises/Interventions: HEP updated 11/16  Exercise/Equipment Resistance/Repetitions Other comments   Stretching     Hamstring [id number]    gastroc [id number] slant   Inclined Calf     Hip Flexion     ITB     Groin                       SLR     Supine 3x10 4# ^ 1/4   Abduction 3x10 4# ^ 1/4   Adduction     Prone     SLR+     Side plank clam 3x10 ^ 11/23, BlackTB  ^1/30, silverTB   ^11/23, 3#, slow control    Isometrics     Quad sets Biofeedback 10\" hold x5 min @ 100% max (rest 10 sec in between) + SLR with 2# 1st ex (no taping 2/1)   Patellar Glides     Medial     Superior     Inferior          ROM     , 0-135 ° 22/38, discussed to re-introduce this ex prior to re-taping at home (11/21)   Hang Weights     Passive     Active     Weight Shift                       CKC     Added 9/19, NV   Wall sits with R foot on soccer ball 10\"x10 Changed 1/11, VC small ROM   4\" (single step), up with L, down with L/ up with R, down with R, added 10/3; HOLD 12/14   ^11/23, D/C   Squatting     Single leg dead lift X10 +6 10# (2nd ex NV) Added 1/16, significant challenge noted   Single leg squat with TRX 3x10 Added 1/11, VC for full knee ext over foot   CC TKE 5\"x30 ^ 2/1 CC level 8    NV ^ 11/16   Sidestepping  Monster walk fwd, bwd 3x15'  3x15' Grey loop, added 11/14   LSD  3x10, significant challenge noted 6\" ^1/30 (hold on for balance)   PRE     Extension  RANGE:   Flexion  RANGE:        Quantum machines     Leg press  ECC 3x10 140# ^2/1   Leg extension     Leg curl          Manual interventions                   Therapeutic Exercise and NMR EXR  [x] (15712) Provided verbal/tactile cueing for activities related to strengthening, flexibility, endurance, ROM for improvements in LE, proximal hip, and core control with self care, mobility, lifting, ambulation.  [] (48868) Provided verbal/tactile cueing for activities related to improving balance, coordination, kinesthetic sense, posture, motor skill, proprioception  to assist with LE, proximal hip, and core control in self care, mobility, lifting, ambulation and eccentric single leg control. NMR and Therapeutic Activities:    [] (21962 or 85020) Provided verbal/tactile cueing for activities related to improving balance, coordination, kinesthetic sense, posture, motor skill, proprioception and motor activation to allow for proper function of core, proximal hip and LE with self care and ADLs  [] (58454) Gait Re-education- Provided training and instruction to the patient for proper LE, core and proximal hip recruitment and positioning and eccentric body weight control with ambulation re-education including up and down stairs     Home Exercise Program:    [x] (72758) Reviewed/Progressed HEP activities related to strengthening, flexibility, endurance, ROM of core, proximal hip and LE for functional self-care, mobility, lifting and ambulation/stair navigation   [] (05806)Reviewed/Progressed HEP activities related to improving balance, coordination, kinesthetic sense, posture, motor skill, proprioception of core, proximal hip and LE for self care, mobility, lifting, and ambulation/stair navigation      Manual Treatments:  PROM / STM / Oscillations-Mobs:  G-I, II, III, IV (PA's, Inf., Post.)  [x] (35050) Provided manual therapy to mobilize LE, proximal hip and/or LS spine soft tissue/joints for the purpose of modulating pain, promoting relaxation,  increasing ROM, reducing/eliminating soft tissue swelling/inflammation/restriction, improving soft tissue extensibility and allowing for proper ROM for normal function with self care, mobility, lifting and ambulation. Modalities:  Vasopneumatic Gameready treatment for effusion of L knee and cryotherapy for 15 minutes at medium pressure.     Charges:  Timed Code Treatment Minutes: 50   Total Treatment Minutes: 65       [] EVAL (LOW) 67732 (typically 20 minutes face-to-face)  [] EVAL (MOD) 40364 (typically 30 minutes face-to-face)  [] EVAL (HIGH) 50331 (typically 45 minutes face-to-face)  [] RE-EVAL   [x] WI(56789) x  2   [] IONTO  [x] NMR (82361) x  1   [x] VASO  [] Manual (20889) x      [] Other:  [] TA x      [] Mech Traction (32204)  [] ES(attended) (22832)      [] ES (un) (39642):     GOALS:   Patient stated goal: return to community activities     [] Progressing: [x] Met: [] Not Met: [] Adjusted     Therapist goals for Patient:   Short Term Goals: To be achieved in: 2-4 weeks  1. Independent in HEP and progression per patient tolerance, in order to prevent re-injury. [] Progressing: [x] Met: [] Not Met: [] Adjusted   2. Patient will have a decrease in pain to facilitate improvement in movement, function, and ADLs as indicated by Functional Deficits. [] Progressing: [x] Met: [] Not Met: [] Adjusted     Long Term Goals: To be achieved in: 12 weeks  1. Foto68/100to assist with reaching prior level of function. [x] Progressing: [] Met: [] Not Met: [] Adjusted  2. Patient will demonstrate increased AROM to =R to allow for proper joint functioning as indicated by patients Functional Deficits. [] Progressing: [x] Met: [] Not Met: [] Adjusted  3. Patient will demonstrate an increase in Strength to good proximal hip strength and control  in LE to allow for proper functional mobility as indicated by patients Functional Deficits. [x] Progressing: [] Met: [] Not Met: [] Adjusted  4. Patient will return to normal heel to toe gait in community  ascend descend 12 steps alternating  functional activities without increased symptoms or restriction. [x] Progressing: [] Met: [] Not Met: [] Adjusted  Overall Progression Towards Functional goals/ Treatment Progress Update:  [] Patient is progressing as expected towards functional goals listed. [x] Progression is slowed due to complexities/Impairments listed. [] Progression has been slowed due to co-morbidities.   [] Plan just implemented, too soon to assess goals progression <30days   [] Goals require adjustment due to lack of progress  [] Patient is not progressing as expected and requires additional follow up with physician  [] Other    Prognosis for POC: [x] Good [] Fair  [] Poor      Patient requires continued skilled intervention: [x] Yes  [] No    Treatment/Activity Tolerance:  [x] Patient able to complete treatment  [] Patient limited by fatigue  [] Patient limited by pain     [] Patient limited by other medical complications  [x] Other: Pt cotninues to exhibit improving quad strength and activation and improved loading in weight bearing. Patient Education:  ITB rolling with foam roller or rolling pin    Access Code: [email or web address]  URL: [email or web address]/  Date: 11/16/2022  Prepared by: Chad Barillas    Program Notes  ITB rolling x3-5 min    Exercises  Long Sitting Quad Set - 1 x daily - 7 x weekly - 10 reps - 10 hold  Active Straight Leg Raise with Quad Set - 1 x daily - 7 x weekly - 3 sets - 10 reps - 10 sec first rep hold  Sidelying Hip Abduction - 1 x daily - 7 x weekly - 3 sets - 10 reps  Clamshell with Resistance - 1 x daily - 7 x weekly - 3 sets - 10 reps  Bridge with Upper Back on Swiss Ball - 1 x daily - 7 x weekly - 3 sets - 10 reps  Heel Raise on Step - 1 x daily - 7 x weekly - 3 sets - 10 reps  Standing Terminal Knee Extension with Resistance - 1 x daily - 7 x weekly - 3 sets - 10 reps - 5 sec hold  Wall Squat with Ball between Knees - 1 x daily - 7 x weekly - 1 sets - 10 reps - 10 sec hold  Single Leg Stance on Foam Pad - 1 x daily - 7 x weekly - 1 sets - 3 reps - 30 sec hold  Seated Long Arc Quad - 1 x daily - 7 x weekly - 2 sets - 10 reps  Prone Quadriceps Stretch - 1 x daily - 7 x weekly - 1 sets - 2 reps - 30 sec hold  Seated Hamstring Stretch - 1 x daily - 7 x weekly - 1 sets - 3 reps - 30 sec hold  Standing Gastroc Stretch on Step - 1 x daily - 7 x weekly - 1 sets - 3 reps - 30 sec hold     PLAN: See eval  [x] Continue per plan of care [] Alter current plan (see comments above)  [] Plan of care initiated [] Hold pending MD visit [] Discharge      Electronically signed by:  Rhianna Collier PT    Note: If patient does not return for scheduled/ recommended follow up visits, this note will serve as a discharge from care along with most recent update on progress.

## 2023-02-08 ENCOUNTER — HOSPITAL ENCOUNTER (OUTPATIENT)
Dept: PHYSICAL THERAPY | Age: 53
Setting detail: THERAPIES SERIES
Discharge: HOME | End: 2023-02-08
Payer: COMMERCIAL

## 2023-02-08 PROCEDURE — 97110 THERAPEUTIC EXERCISES: CPT

## 2023-02-08 PROCEDURE — 97112 NEUROMUSCULAR REEDUCATION: CPT

## 2023-02-08 PROCEDURE — 97016 VASOPNEUMATIC DEVICE THERAPY: CPT

## 2023-02-08 NOTE — FLOWSHEET NOTE
[address],[facility],  [address]  Phone: [phone]   Fax:     [phone]      Physical Therapy Daily Treatment Note  Date:  2023    Patient Name:  Breann Gaviria   \"JT\" :  1970  MRN: [id number]  Restrictions/Precautions:    Medical/Treatment Diagnosis Information:  Diagnosis: Primary osteoarthritis of left knee (M17.12)  Treatment Diagnosis: L knee pain I95.193  Insurance/Certification information:  PT Insurance Information: [facility]  Physician Information:   Diogo Wilkes  Has the plan of care been signed (Y/N):        []  Yes  [x]  No     Date of Patient follow up with Physician: per md, mid Oct (not scheduled yet)      Is this a Progress Report:     []  Yes  [x]  No        If Yes:  Date Range for reporting period:  Beginning   Ending     Progress report will be due (10 Rx or 30 days whichever is less):        Recertification will be due (POC Duration  / 90 days whichever is less):          Visit # Insurance Allowable Auth Required   10  34 total 60 []  Yes []  No        Functional Scale: FOTO: 55/100    Date assessed:  23       Latex Allergy:  [x]NO      []YES  Preferred Language for Healthcare:   [x]English       []other:    Pain level:  0/10     SUBJECTIVE:  Pt reports nothing new since last visit. DOS: 9/15/22    OBJECTIVE:    ROM 0-135 °     Observation: portal sites healing well; pt amb with no crutches;  minimal skin irritation still noted, small area of broken skin (will hold on tape today)  : Trial with Breg lateral stabilization brace today. There was a bit of an adjustment period in the clinic however patella seemed to [facility] in place\" and brace felt good.  Pt to take home with him and continue trial  : pt to keep brace and figured out why it wasn't feeling right    RESTRICTIONS/PRECAUTIONS: acitivty moderation     Exercises/Interventions: HEP updated 11/16  Exercise/Equipment Resistance/Repetitions Other comments   Stretching     Hamstring 99torx3    gastroc 21chjt2 slant   Inclined Calf     Hip Flexion     ITB     Groin                       SLR     Supine 3x10 4# ^ 1/4   Abduction 3x10 4# ^ 1/4   Adduction     Prone over table 3x10 4#, added 2/8   SLR+     Side plank clam 3x10 ^ 11/23, BlackTB  ^1/30, silverTB   ^11/23, 3#, slow control    Isometrics     Quad sets Biofeedback 10\" hold x5 min @ 95% max (rest 10 sec in between) + SLR with 2# 1st ex (no taping 2/1)   Patellar Glides     Medial     Superior     Inferior          ROM     , 0-135 ° 22/38, discussed to re-introduce this ex prior to re-taping at home (11/21)   Hang Weights     Passive     Active     Weight Shift                       CKC     Added 9/19, NV   Wall sits with R foot on soccer ball 10\"x10 Changed 1/11, VC small ROM   4\" (single step), up with L, down with L/ up with R, down with R, added 10/3; HOLD 12/14   ^11/23, D/C   Split Squat 3x10 Added 2/8   Single leg dead lift X10 +7 10# (2nd ex) Added 1/16, significant challenge noted   D/C 2/8   CC TKE 5\"x30 ^ 2/1 CC level 8   Bridges SB 10\"x10  ^ 11/16   Sidestepping  Monster walk fwd, bwd 3x15'  3x15' Grey loop, added 11/14   LSD  3x10, significant challenge noted 6\" ^1/30 (hold on for balance)   PRE     Extension  RANGE:   Flexion  RANGE:        Quantum machines     Leg press  ECC 3x10 140# ^2/1   Leg extension     Leg curl          Manual interventions                   Therapeutic Exercise and NMR EXR  [x] (53444) Provided verbal/tactile cueing for activities related to strengthening, flexibility, endurance, ROM for improvements in LE, proximal hip, and core control with self care, mobility, lifting, ambulation.  [] (36675) Provided verbal/tactile cueing for activities related to improving balance, coordination, kinesthetic sense, posture, motor skill, proprioception  to assist with LE, proximal hip, and core control in self care, mobility, lifting, ambulation and eccentric single leg control. NMR and Therapeutic Activities:    [] (46297 or 36916) Provided verbal/tactile cueing for activities related to improving balance, coordination, kinesthetic sense, posture, motor skill, proprioception and motor activation to allow for proper function of core, proximal hip and LE with self care and ADLs  [] (10718) Gait Re-education- Provided training and instruction to the patient for proper LE, core and proximal hip recruitment and positioning and eccentric body weight control with ambulation re-education including up and down stairs     Home Exercise Program:    [x] (83667) Reviewed/Progressed HEP activities related to strengthening, flexibility, endurance, ROM of core, proximal hip and LE for functional self-care, mobility, lifting and ambulation/stair navigation   [] (53191)Reviewed/Progressed HEP activities related to improving balance, coordination, kinesthetic sense, posture, motor skill, proprioception of core, proximal hip and LE for self care, mobility, lifting, and ambulation/stair navigation      Manual Treatments:  PROM / STM / Oscillations-Mobs:  G-I, II, III, IV (PA's, Inf., Post.)  [x] (02860) Provided manual therapy to mobilize LE, proximal hip and/or LS spine soft tissue/joints for the purpose of modulating pain, promoting relaxation,  increasing ROM, reducing/eliminating soft tissue swelling/inflammation/restriction, improving soft tissue extensibility and allowing for proper ROM for normal function with self care, mobility, lifting and ambulation. Modalities:  Vasopneumatic Gameready treatment for effusion of L knee and cryotherapy for 15 minutes at medium pressure.     Charges:  Timed Code Treatment Minutes: 50   Total Treatment Minutes: 65       [] EVAL (LOW) 90516 (typically 20 minutes face-to-face)  [] EVAL (MOD) 02611 (typically 30 minutes face-to-face)  [] EVAL (HIGH) 82764 (typically 45 minutes face-to-face)  [] RE-EVAL [x] MP(54420) x  2   [] IONTO  [x] NMR (10073) x  1   [x] VASO  [] Manual (74348) x      [] Other:  [] TA x      [] Mech Traction (15910)  [] ES(attended) (73189)      [] ES (un) (01874):     GOALS:   Patient stated goal: return to community activities     [] Progressing: [x] Met: [] Not Met: [] Adjusted     Therapist goals for Patient:   Short Term Goals: To be achieved in: 2-4 weeks  1. Independent in HEP and progression per patient tolerance, in order to prevent re-injury. [] Progressing: [x] Met: [] Not Met: [] Adjusted   2. Patient will have a decrease in pain to facilitate improvement in movement, function, and ADLs as indicated by Functional Deficits. [] Progressing: [x] Met: [] Not Met: [] Adjusted     Long Term Goals: To be achieved in: 12 weeks  1. Foto68/100to assist with reaching prior level of function. [x] Progressing: [] Met: [] Not Met: [] Adjusted  2. Patient will demonstrate increased AROM to =R to allow for proper joint functioning as indicated by patients Functional Deficits. [] Progressing: [x] Met: [] Not Met: [] Adjusted  3. Patient will demonstrate an increase in Strength to good proximal hip strength and control  in LE to allow for proper functional mobility as indicated by patients Functional Deficits. [x] Progressing: [] Met: [] Not Met: [] Adjusted  4. Patient will return to normal heel to toe gait in community  ascend descend 12 steps alternating  functional activities without increased symptoms or restriction. [x] Progressing: [] Met: [] Not Met: [] Adjusted  Overall Progression Towards Functional goals/ Treatment Progress Update:  [] Patient is progressing as expected towards functional goals listed. [x] Progression is slowed due to complexities/Impairments listed. [] Progression has been slowed due to co-morbidities.   [] Plan just implemented, too soon to assess goals progression <30days   [] Goals require adjustment due to lack of progress  [] Patient is not progressing as expected and requires additional follow up with physician  [] Other    Prognosis for POC: [x] Good [] Fair  [] Poor      Patient requires continued skilled intervention: [x] Yes  [] No    Treatment/Activity Tolerance:  [x] Patient able to complete treatment  [] Patient limited by fatigue  [] Patient limited by pain     [] Patient limited by other medical complications  [x] Other: Pt cotninues to exhibit improving quad strength and activation and improved loading in weight bearing. Patient Education:  ITB rolling with foam roller or rolling pin    Access Code: [email or web address]  URL: [employer]/  Date: 11/16/2022  Prepared by: Beto Dudley    Program Notes  ITB rolling x3-5 min    Exercises  Long Sitting Quad Set - 1 x daily - 7 x weekly - 10 reps - 10 hold  Active Straight Leg Raise with Quad Set - 1 x daily - 7 x weekly - 3 sets - 10 reps - 10 sec first rep hold  Sidelying Hip Abduction - 1 x daily - 7 x weekly - 3 sets - 10 reps  Clamshell with Resistance - 1 x daily - 7 x weekly - 3 sets - 10 reps  Bridge with Upper Back on Swiss Ball - 1 x daily - 7 x weekly - 3 sets - 10 reps  Heel Raise on Step - 1 x daily - 7 x weekly - 3 sets - 10 reps  Standing Terminal Knee Extension with Resistance - 1 x daily - 7 x weekly - 3 sets - 10 reps - 5 sec hold  Wall Squat with Ball between Knees - 1 x daily - 7 x weekly - 1 sets - 10 reps - 10 sec hold  Single Leg Stance on Foam Pad - 1 x daily - 7 x weekly - 1 sets - 3 reps - 30 sec hold  Seated Long Arc Quad - 1 x daily - 7 x weekly - 2 sets - 10 reps  Prone Quadriceps Stretch - 1 x daily - 7 x weekly - 1 sets - 2 reps - 30 sec hold  Seated Hamstring Stretch - 1 x daily - 7 x weekly - 1 sets - 3 reps - 30 sec hold  Standing Gastroc Stretch on Step - 1 x daily - 7 x weekly - 1 sets - 3 reps - 30 sec hold     PLAN: See eval  [x] Continue per plan of care [] Alter current plan (see comments above)  [] Plan of care initiated [] Hold pending MD visit [] Discharge      Electronically signed by:  Karina Dumont PT    Note: If patient does not return for scheduled/ recommended follow up visits, this note will serve as a discharge from care along with most recent update on progress.

## 2023-02-13 ENCOUNTER — HOSPITAL ENCOUNTER (OUTPATIENT)
Dept: PHYSICAL THERAPY | Age: 53
Setting detail: THERAPIES SERIES
Discharge: HOME | End: 2023-02-13
Payer: COMMERCIAL

## 2023-02-13 PROCEDURE — 97016 VASOPNEUMATIC DEVICE THERAPY: CPT

## 2023-02-13 PROCEDURE — 97112 NEUROMUSCULAR REEDUCATION: CPT

## 2023-02-13 PROCEDURE — 97110 THERAPEUTIC EXERCISES: CPT

## 2023-02-13 NOTE — FLOWSHEET NOTE
The [address] [place], [place] [address], [address] [id number] [facility], [address] [place] [address]  Phone: [phone]   Fax:     [phone]      Physical Therapy Daily Treatment Note  Date:  2023    Patient Name:  Musa Mccauley   \"JT\" :  1970  MRN: [id number]  Restrictions/Precautions:    Medical/Treatment Diagnosis Information:  Diagnosis: Primary osteoarthritis of left knee (M17.12)  Treatment Diagnosis: L knee pain N52.007  Insurance/Certification information:  PT Insurance Information: [employer]  Physician Information:   Vinayak Phillips  Has the plan of care been signed (Y/N):        []  Yes  [x]  No     Date of Patient follow up with Physician: per md, mid Oct (not scheduled yet)      Is this a Progress Report:     []  Yes  [x]  No        If Yes:  Date Range for reporting period:  Beginning   Ending     Progress report will be due (10 Rx or 30 days whichever is less): 14/15       Recertification will be due (POC Duration  / 90 days whichever is less):          Visit # Insurance Allowable Auth Required   10  34 total 60 []  Yes []  No        Functional Scale: FOTO: 55/100    Date assessed:  23       Latex Allergy:  [x]NO      []YES  Preferred Language for Healthcare:   [x]English       []other:    Pain level:  0/10     SUBJECTIVE:  Pt reports nothing new since last visit. DOS: 9/15/22    OBJECTIVE:    ROM 0-135 °     Observation: portal sites healing well; pt amb with no crutches;  minimal skin irritation still noted, small area of broken skin (will hold on tape today)  : Trial with Breg lateral stabilization brace today. There was a bit of an adjustment period in the clinic however patella seemed to [facility] in place\" and brace felt good.  Pt to take home with him and continue trial  : pt to keep brace and figured out why it wasn't feeling right    RESTRICTIONS/PRECAUTIONS: acitivty moderation     Exercises/Interventions: HEP updated 11/16  Exercise/Equipment Resistance/Repetitions Other comments   Stretching     Hamstring 18rexd3    gastroc 25dsnp3 slant   Inclined Calf     Hip Flexion     ITB     Groin                       SLR     Supine 3x10 4# ^ 1/4   Abduction 3x10 4# ^ 1/4   Adduction     Prone over table 3x10 4#, added 2/8   SLR+     Side plank clam 3x10 ^ 11/23, BlackTB  ^1/30, silverTB   ^11/23, 3#, slow control    Isometrics     Quad sets Biofeedback 10\" hold x5 min @ 95% max (rest 10 sec in between) + SLR with 2# 1st ex (no taping 2/13)   Patellar Glides     Medial     Superior     Inferior          ROM     , 0-135 ° 22/38, discussed to re-introduce this ex prior to re-taping at home (11/21)   Hang Weights     Passive     Active     Weight Shift                       CKC     Added 9/19, NV   Wall sits with R foot on soccer ball 10\"x10 Changed 1/11, VC small ROM   4\" (single step), up with L, down with L/ up with R, down with R, added 10/3; HOLD 12/14   ^11/23, D/C   Split Squat 3x10 Added 2/8   Single leg dead lift X10 +7 10# (2nd ex) Added 1/16, significant challenge noted   D/C 2/8   CC TKE 5\"x30 ^ 2/1 CC level 8   Bridges SB 10\"x10  ^ 11/16   Sidestepping  Monster walk fwd, bwd 3x15'  3x15' Grey loop, added 11/14   LSD  3x10, significant challenge noted 6\" ^1/30 (hold on for balance)   PRE     Extension  RANGE:   Flexion  RANGE:        Quantum machines     Leg press  ECC 3x10 140# ^2/1   Leg extension     Leg curl          Manual interventions                   Therapeutic Exercise and NMR EXR  [x] (68529) Provided verbal/tactile cueing for activities related to strengthening, flexibility, endurance, ROM for improvements in LE, proximal hip, and core control with self care, mobility, lifting, ambulation.  [] (74895) Provided verbal/tactile cueing for activities related to improving balance, coordination, kinesthetic sense, posture, motor skill, proprioception  to assist with LE, proximal hip, and core control in self care, mobility, lifting, ambulation and eccentric single leg control. NMR and Therapeutic Activities:    [] (83050 or 44107) Provided verbal/tactile cueing for activities related to improving balance, coordination, kinesthetic sense, posture, motor skill, proprioception and motor activation to allow for proper function of core, proximal hip and LE with self care and ADLs  [] (94463) Gait Re-education- Provided training and instruction to the patient for proper LE, core and proximal hip recruitment and positioning and eccentric body weight control with ambulation re-education including up and down stairs     Home Exercise Program:    [x] (18565) Reviewed/Progressed HEP activities related to strengthening, flexibility, endurance, ROM of core, proximal hip and LE for functional self-care, mobility, lifting and ambulation/stair navigation   [] (37544)Reviewed/Progressed HEP activities related to improving balance, coordination, kinesthetic sense, posture, motor skill, proprioception of core, proximal hip and LE for self care, mobility, lifting, and ambulation/stair navigation      Manual Treatments:  PROM / STM / Oscillations-Mobs:  G-I, II, III, IV (PA's, Inf., Post.)  [x] (70862) Provided manual therapy to mobilize LE, proximal hip and/or LS spine soft tissue/joints for the purpose of modulating pain, promoting relaxation,  increasing ROM, reducing/eliminating soft tissue swelling/inflammation/restriction, improving soft tissue extensibility and allowing for proper ROM for normal function with self care, mobility, lifting and ambulation. Modalities:  Vasopneumatic Gameready treatment for effusion of L knee and cryotherapy for 15 minutes at medium pressure.     Charges:  Timed Code Treatment Minutes: 50   Total Treatment Minutes: 65       [] EVAL (LOW) 89370 (typically 20 minutes face-to-face)  [] EVAL (MOD) 88197 (typically 30 minutes face-to-face)  [] EVAL (HIGH) 54503 (typically 45 minutes face-to-face)  [] RE-EVAL [x] EZ(25482) x  2   [] IONTO  [x] NMR (32190) x  1   [x] VASO  [] Manual (57251) x      [] Other:  [] TA x      [] Mech Traction (77454)  [] ES(attended) (93356)      [] ES (un) (80721):     GOALS:   Patient stated goal: return to community activities     [] Progressing: [x] Met: [] Not Met: [] Adjusted     Therapist goals for Patient:   Short Term Goals: To be achieved in: 2-4 weeks  1. Independent in HEP and progression per patient tolerance, in order to prevent re-injury. [] Progressing: [x] Met: [] Not Met: [] Adjusted   2. Patient will have a decrease in pain to facilitate improvement in movement, function, and ADLs as indicated by Functional Deficits. [] Progressing: [x] Met: [] Not Met: [] Adjusted     Long Term Goals: To be achieved in: 12 weeks  1. Foto68/100to assist with reaching prior level of function. [x] Progressing: [] Met: [] Not Met: [] Adjusted  2. Patient will demonstrate increased AROM to =R to allow for proper joint functioning as indicated by patients Functional Deficits. [] Progressing: [x] Met: [] Not Met: [] Adjusted  3. Patient will demonstrate an increase in Strength to good proximal hip strength and control  in LE to allow for proper functional mobility as indicated by patients Functional Deficits. [x] Progressing: [] Met: [] Not Met: [] Adjusted  4. Patient will return to normal heel to toe gait in community  ascend descend 12 steps alternating  functional activities without increased symptoms or restriction. [x] Progressing: [] Met: [] Not Met: [] Adjusted  Overall Progression Towards Functional goals/ Treatment Progress Update:  [] Patient is progressing as expected towards functional goals listed. [x] Progression is slowed due to complexities/Impairments listed. [] Progression has been slowed due to co-morbidities.   [] Plan just implemented, too soon to assess goals progression <30days   [] Goals require adjustment due to lack of progress  [] Patient is not progressing as expected and requires additional follow up with physician  [] Other    Prognosis for POC: [x] Good [] Fair  [] Poor      Patient requires continued skilled intervention: [x] Yes  [] No    Treatment/Activity Tolerance:  [x] Patient able to complete treatment  [] Patient limited by fatigue  [] Patient limited by pain     [] Patient limited by other medical complications  [x] Other: Pt tolerated ex this visit with no taping this visit, reporting ? challenge. No pain, but ? challenge. Patient Education:  ITB rolling with foam roller or rolling pin    Access Code: [email or web address]  URL: [email or web address]/  Date: 11/16/2022  Prepared by: Jarrett Poplar    Program Notes  ITB rolling x3-5 min    Exercises  Long Sitting Quad Set - 1 x daily - 7 x weekly - 10 reps - 10 hold  Active Straight Leg Raise with Quad Set - 1 x daily - 7 x weekly - 3 sets - 10 reps - 10 sec first rep hold  Sidelying Hip Abduction - 1 x daily - 7 x weekly - 3 sets - 10 reps  Clamshell with Resistance - 1 x daily - 7 x weekly - 3 sets - 10 reps  Bridge with Upper Back on Swiss Ball - 1 x daily - 7 x weekly - 3 sets - 10 reps  Heel Raise on Step - 1 x daily - 7 x weekly - 3 sets - 10 reps  Standing Terminal Knee Extension with Resistance - 1 x daily - 7 x weekly - 3 sets - 10 reps - 5 sec hold  Wall Squat with Ball between Knees - 1 x daily - 7 x weekly - 1 sets - 10 reps - 10 sec hold  Single Leg Stance on Foam Pad - 1 x daily - 7 x weekly - 1 sets - 3 reps - 30 sec hold  Seated Long Arc Quad - 1 x daily - 7 x weekly - 2 sets - 10 reps  Prone Quadriceps Stretch - 1 x daily - 7 x weekly - 1 sets - 2 reps - 30 sec hold  Seated Hamstring Stretch - 1 x daily - 7 x weekly - 1 sets - 3 reps - 30 sec hold  Standing Gastroc Stretch on Step - 1 x daily - 7 x weekly - 1 sets - 3 reps - 30 sec hold     PLAN: See eval. Pt will continue x2/wk through Feb and drop to x1/wk  [x] Continue per plan of care [] Alter current plan (see comments above)  [] Plan of care initiated [] Hold pending MD visit [] Discharge      Electronically signed by:  Eric Gil PT    Note: If patient does not return for scheduled/ recommended follow up visits, this note will serve as a discharge from care along with most recent update on progress.

## 2023-02-15 ENCOUNTER — HOSPITAL ENCOUNTER (OUTPATIENT)
Dept: PHYSICAL THERAPY | Age: 53
Setting detail: THERAPIES SERIES
Discharge: HOME | End: 2023-02-15
Payer: COMMERCIAL

## 2023-02-15 PROCEDURE — 97016 VASOPNEUMATIC DEVICE THERAPY: CPT

## 2023-02-15 PROCEDURE — 97110 THERAPEUTIC EXERCISES: CPT

## 2023-02-15 PROCEDURE — 97112 NEUROMUSCULAR REEDUCATION: CPT

## 2023-02-15 NOTE — FLOWSHEET NOTE
The [address] [place] [address],[address], 800 [facility] 3360 [facility], [address] [place] [address]  Phone: [phone]   Fax:     [phone]      Physical Therapy Daily Treatment Note  Date:  2/15/2023    Patient Name:  Mary Jo Martin   \"JT\" :  1970  MRN: [id number]  Restrictions/Precautions:    Medical/Treatment Diagnosis Information:  Diagnosis: Primary osteoarthritis of left knee (M17.12)  Treatment Diagnosis: L knee pain J00.569  Insurance/Certification information:  PT Insurance Information: [facility]  Physician Information:   Frederick Salinas  Has the plan of care been signed (Y/N):        []  Yes  [x]  No     Date of Patient follow up with Physician: per md, mid Oct (not scheduled yet)      Is this a Progress Report:     []  Yes  [x]  No        If Yes:  Date Range for reporting period:  Beginning   Ending     Progress report will be due (10 Rx or 30 days whichever is less):        Recertification will be due (POC Duration  / 90 days whichever is less):          Visit # Insurance Allowable Auth Required   11  35 total 60 []  Yes []  No        Functional Scale: FOTO: 55/100    Date assessed:  23       Latex Allergy:  [x]NO      []YES  Preferred Language for Healthcare:   [x]English       []other:    Pain level:  0/10     SUBJECTIVE:  Pt reports he felt ok after Monday session. His muscles felt sore. DOS: 9/15/22    OBJECTIVE:    ROM 0-135 °     Observation: portal sites healing well; pt amb with no crutches;  minimal skin irritation still noted, small area of broken skin (will hold on tape today)  : Trial with Breg lateral stabilization brace today. There was a bit of an adjustment period in the clinic however patella seemed to [facility] in place\" and brace felt good.  Pt to take home with him and continue trial  : pt to keep brace and figured out why it wasn't feeling right    RESTRICTIONS/PRECAUTIONS: acitivty moderation Exercises/Interventions: HEP updated 11/16  Exercise/Equipment Resistance/Repetitions Other comments   Stretching     Hamstring [id number]    gastroc [id number] slant   Inclined Calf     Hip Flexion     ITB     Groin                       SLR     Supine 3x10 4# ^ 1/4   Abduction 3x10 4# ^ 1/4   Adduction     Prone over table 3x10 4#, added 2/8   SLR+     Side plank clam 3x10 ^ 11/23, BlackTB  ^1/30, silverTB   ^11/23, 3#, slow control    Isometrics     Quad sets Biofeedback 10\" hold x4 min @ 100% max (rest 10 sec in between) + SLR with 2# 1st ex (no taping 2/15) cut short this visit   Patellar Glides     Medial     Superior     Inferior          ROM     , 0-135 ° 22/38, discussed to re-introduce this ex prior to re-taping at home (11/21)   Hang Weights     Passive     Active     Weight Shift                       CKC     Added 9/19, NV   Wall sits with R foot on soccer ball 10\"x10 Changed 1/11, VC small ROM   4\" (single step), up with L, down with L/ up with R, down with R, added 10/3; HOLD 12/14   ^11/23, D/C   Split Squat 3x10 Added 2/8   Single leg dead lift 2x10 10# (2nd ex) Added 1/16, significant challenge noted   D/C 2/8   CC TKE 5\"x30 ^ 2/1 CC level 8   Bridges SB 10\"x10  ^ 11/16   Sidestepping  Monster walk fwd, bwd 3x15'  3x15' Grey loop, added 11/14   LSD  4x10 6\" ^1/30 (hold on for balance)   PRE     Extension  RANGE:   Flexion  RANGE:        Quantum machines     Leg press single leg 3x10 100# Changed 2/15   Leg extension     Leg curl          Manual interventions                   Therapeutic Exercise and NMR EXR  [x] (51027) Provided verbal/tactile cueing for activities related to strengthening, flexibility, endurance, ROM for improvements in LE, proximal hip, and core control with self care, mobility, lifting, ambulation.  [] (81774) Provided verbal/tactile cueing for activities related to improving balance, coordination, kinesthetic sense, posture, motor skill, proprioception  to assist with LE, proximal hip, and core control in self care, mobility, lifting, ambulation and eccentric single leg control. NMR and Therapeutic Activities:    [] (43064 or 66697) Provided verbal/tactile cueing for activities related to improving balance, coordination, kinesthetic sense, posture, motor skill, proprioception and motor activation to allow for proper function of core, proximal hip and LE with self care and ADLs  [] (57897) Gait Re-education- Provided training and instruction to the patient for proper LE, core and proximal hip recruitment and positioning and eccentric body weight control with ambulation re-education including up and down stairs     Home Exercise Program:    [x] (26845) Reviewed/Progressed HEP activities related to strengthening, flexibility, endurance, ROM of core, proximal hip and LE for functional self-care, mobility, lifting and ambulation/stair navigation   [] (56364)Reviewed/Progressed HEP activities related to improving balance, coordination, kinesthetic sense, posture, motor skill, proprioception of core, proximal hip and LE for self care, mobility, lifting, and ambulation/stair navigation      Manual Treatments:  PROM / STM / Oscillations-Mobs:  G-I, II, III, IV (PA's, Inf., Post.)  [x] (12980) Provided manual therapy to mobilize LE, proximal hip and/or LS spine soft tissue/joints for the purpose of modulating pain, promoting relaxation,  increasing ROM, reducing/eliminating soft tissue swelling/inflammation/restriction, improving soft tissue extensibility and allowing for proper ROM for normal function with self care, mobility, lifting and ambulation. Modalities:  Vasopneumatic Gameready treatment for effusion of L knee and cryotherapy for 15 minutes at medium pressure.     Charges:  Timed Code Treatment Minutes: 50   Total Treatment Minutes: 65       [] EVAL (LOW) 33202 (typically 20 minutes face-to-face)  [] EVAL (MOD) 40297 (typically 30 minutes face-to-face)  [] EVAL (HIGH) 92800 (typically 45 minutes face-to-face)  [] RE-EVAL   [x] JF(41814) x  2   [] IONTO  [x] NMR (55962) x  1   [x] VASO  [] Manual (24095) x      [] Other:  [] TA x      [] Mech Traction (53692)  [] ES(attended) (25853)      [] ES (un) (78506):     GOALS:   Patient stated goal: return to community activities     [] Progressing: [x] Met: [] Not Met: [] Adjusted     Therapist goals for Patient:   Short Term Goals: To be achieved in: 2-4 weeks  1. Independent in HEP and progression per patient tolerance, in order to prevent re-injury. [] Progressing: [x] Met: [] Not Met: [] Adjusted   2. Patient will have a decrease in pain to facilitate improvement in movement, function, and ADLs as indicated by Functional Deficits. [] Progressing: [x] Met: [] Not Met: [] Adjusted     Long Term Goals: To be achieved in: 12 weeks  1. Foto68/100to assist with reaching prior level of function. [x] Progressing: [] Met: [] Not Met: [] Adjusted  2. Patient will demonstrate increased AROM to =R to allow for proper joint functioning as indicated by patients Functional Deficits. [] Progressing: [x] Met: [] Not Met: [] Adjusted  3. Patient will demonstrate an increase in Strength to good proximal hip strength and control  in LE to allow for proper functional mobility as indicated by patients Functional Deficits. [x] Progressing: [] Met: [] Not Met: [] Adjusted  4. Patient will return to normal heel to toe gait in community  ascend descend 12 steps alternating  functional activities without increased symptoms or restriction. [x] Progressing: [] Met: [] Not Met: [] Adjusted  Overall Progression Towards Functional goals/ Treatment Progress Update:  [] Patient is progressing as expected towards functional goals listed. [x] Progression is slowed due to complexities/Impairments listed. [] Progression has been slowed due to co-morbidities.   [] Plan just implemented, too soon to assess goals progression <30days   [] Goals require adjustment due to lack of progress  [] Patient is not progressing as expected and requires additional follow up with physician  [] Other    Prognosis for POC: [x] Good [] Fair  [] Poor      Patient requires continued skilled intervention: [x] Yes  [] No    Treatment/Activity Tolerance:  [x] Patient able to complete treatment  [] Patient limited by fatigue  [] Patient limited by pain     [] Patient limited by other medical complications  [x] Other: Pt tolerated ex this visit with no taping this visit, reporting ? challenge. No pain, but ? challenge. Patient Education:  ITB rolling with foam roller or rolling pin    Access Code: [email or web address]  URL: [employer]/  Date: 11/16/2022  Prepared by: Jesus Bowling    Program Notes  ITB rolling x3-5 min    Exercises  Long Sitting Quad Set - 1 x daily - 7 x weekly - 10 reps - 10 hold  Active Straight Leg Raise with Quad Set - 1 x daily - 7 x weekly - 3 sets - 10 reps - 10 sec first rep hold  Sidelying Hip Abduction - 1 x daily - 7 x weekly - 3 sets - 10 reps  Clamshell with Resistance - 1 x daily - 7 x weekly - 3 sets - 10 reps  Bridge with Upper Back on Swiss Ball - 1 x daily - 7 x weekly - 3 sets - 10 reps  Heel Raise on Step - 1 x daily - 7 x weekly - 3 sets - 10 reps  Standing Terminal Knee Extension with Resistance - 1 x daily - 7 x weekly - 3 sets - 10 reps - 5 sec hold  Wall Squat with Ball between Knees - 1 x daily - 7 x weekly - 1 sets - 10 reps - 10 sec hold  Single Leg Stance on Foam Pad - 1 x daily - 7 x weekly - 1 sets - 3 reps - 30 sec hold  Seated Long Arc Quad - 1 x daily - 7 x weekly - 2 sets - 10 reps  Prone Quadriceps Stretch - 1 x daily - 7 x weekly - 1 sets - 2 reps - 30 sec hold  Seated Hamstring Stretch - 1 x daily - 7 x weekly - 1 sets - 3 reps - 30 sec hold  Standing Gastroc Stretch on Step - 1 x daily - 7 x weekly - 1 sets - 3 reps - 30 sec hold     PLAN: See eval. Pt will continue x2/wk through Feb and drop to x1/wk  [x] Continue per plan of care [] Alter current plan (see comments above)  [] Plan of care initiated [] Hold pending MD visit [] Discharge      Electronically signed by:  Tracy Mak PT    Note: If patient does not return for scheduled/ recommended follow up visits, this note will serve as a discharge from care along with most recent update on progress.

## 2023-02-20 ENCOUNTER — HOSPITAL ENCOUNTER (OUTPATIENT)
Dept: PHYSICAL THERAPY | Age: 53
Setting detail: THERAPIES SERIES
Discharge: HOME | End: 2023-02-20
Payer: COMMERCIAL

## 2023-02-20 PROCEDURE — 97112 NEUROMUSCULAR REEDUCATION: CPT

## 2023-02-20 PROCEDURE — 97110 THERAPEUTIC EXERCISES: CPT

## 2023-02-20 PROCEDURE — 97016 VASOPNEUMATIC DEVICE THERAPY: CPT

## 2023-02-20 NOTE — FLOWSHEET NOTE
The [address] [place], [place] [address], [address], [address] [place] [address]  Phone: [phone]   Fax:     [phone]      Physical Therapy Daily Treatment Note  Date:  2023    Patient Name:  Hilary Wilson   \"JT\" :  1970  MRN: [id number]  Restrictions/Precautions:    Medical/Treatment Diagnosis Information:  Diagnosis: Primary osteoarthritis of left knee (M17.12)  Treatment Diagnosis: L knee pain F88.097  Insurance/Certification information:  PT Insurance Information: [facility]  Physician Information:   Romeo Rainey  Has the plan of care been signed (Y/N):        []  Yes  [x]  No     Date of Patient follow up with Physician: per md, mid Oct (not scheduled yet)      Is this a Progress Report:     []  Yes  [x]  No        If Yes:  Date Range for reporting period:  Beginning   Ending     Progress report will be due (10 Rx or 30 days whichever is less):        Recertification will be due (POC Duration  / 90 days whichever is less):          Visit # Insurance Allowable Auth Required   12  36 total 60 []  Yes []  No        Functional Scale: FOTO: 55/100    Date assessed:  23       Latex Allergy:  [x]NO      []YES  Preferred Language for Healthcare:   [x]English       []other:    Pain level:  0/10     SUBJECTIVE:  Pt reports he felt ok after Monday session. His muscles felt sore. DOS: 9/15/22    OBJECTIVE:    ROM 0-135 °     Observation: portal sites healing well; pt amb with no crutches;  minimal skin irritation still noted, small area of broken skin (will hold on tape today)  : Trial with Breg lateral stabilization brace today. There was a bit of an adjustment period in the clinic however patella seemed to [facility] in place\" and brace felt good.  Pt to take home with him and continue trial  : pt to keep brace and figured out why it wasn't feeling right    RESTRICTIONS/PRECAUTIONS: acitivty moderation Exercises/Interventions: HEP updated 11/16  Exercise/Equipment Resistance/Repetitions Other comments   Stretching     Hamstring [id number]    gastroc [id number] slant   Inclined Calf     Hip Flexion     ITB     Groin                       SLR     Supine 3x10 4# ^ 1/4   Abduction 3x10 4# ^ 1/4   Adduction     Prone over table 3x10 4#, added 2/8   SLR+        Side plank clam 3x10 ^ 11/23, BlackTB  ^1/30, silverTB   LAQ single leg 3x10 ^2/20, 30#, added 2/20, TKE block   Isometrics     Quad sets Biofeedback 10\" hold x4 min @ 100% max (rest 10 sec in between) + SLR with 2# 1st ex (no taping 2/15) cut short this visit   Patellar Glides     Medial     Superior     Inferior          ROM     , 0-135 ° 22/38, discussed to re-introduce this ex prior to re-taping at home (11/21)   Hang Weights     Passive     Active     Weight Shift                       CKC     Added 9/19, NV   Wall sits with R foot on soccer ball 10\"x10 Changed 1/11, VC small ROM   4\" (single step), up with L, down with L/ up with R, down with R, added 10/3; HOLD 12/14   ^11/23, D/C   Split Squat 3x10 Added 2/8   Single leg dead lift 2x10 10# (2nd ex) Added 1/16, significant challenge noted   D/C 2/8   CC TKE 5\"x30 ^ 2/1 CC level 8   Bridges single leg 3x10 Added 2/20   Sidestepping  Monster walk fwd, bwd 3x15'  3x15' Grey loop, added 11/14   LSD  4x10 6\" ^1/30 (hold on for balance)   PRE     Extension  RANGE:   Flexion  RANGE:        Quantum machines     Leg press single leg 3x10 100# Changed 2/15   Leg extension     Leg curl          Manual interventions                   Therapeutic Exercise and NMR EXR  [x] (28913) Provided verbal/tactile cueing for activities related to strengthening, flexibility, endurance, ROM for improvements in LE, proximal hip, and core control with self care, mobility, lifting, ambulation.  [] (60153) Provided verbal/tactile cueing for activities related to improving balance, coordination, kinesthetic sense, posture, motor skill, proprioception  to assist with LE, proximal hip, and core control in self care, mobility, lifting, ambulation and eccentric single leg control. NMR and Therapeutic Activities:    [] (09178 or 21469) Provided verbal/tactile cueing for activities related to improving balance, coordination, kinesthetic sense, posture, motor skill, proprioception and motor activation to allow for proper function of core, proximal hip and LE with self care and ADLs  [] (40756) Gait Re-education- Provided training and instruction to the patient for proper LE, core and proximal hip recruitment and positioning and eccentric body weight control with ambulation re-education including up and down stairs     Home Exercise Program:    [x] (27252) Reviewed/Progressed HEP activities related to strengthening, flexibility, endurance, ROM of core, proximal hip and LE for functional self-care, mobility, lifting and ambulation/stair navigation   [] (48696)Reviewed/Progressed HEP activities related to improving balance, coordination, kinesthetic sense, posture, motor skill, proprioception of core, proximal hip and LE for self care, mobility, lifting, and ambulation/stair navigation      Manual Treatments:  PROM / STM / Oscillations-Mobs:  G-I, II, III, IV (PA's, Inf., Post.)  [x] (14792) Provided manual therapy to mobilize LE, proximal hip and/or LS spine soft tissue/joints for the purpose of modulating pain, promoting relaxation,  increasing ROM, reducing/eliminating soft tissue swelling/inflammation/restriction, improving soft tissue extensibility and allowing for proper ROM for normal function with self care, mobility, lifting and ambulation. Modalities:  Vasopneumatic Gameready treatment for effusion of L knee and cryotherapy for 15 minutes at medium pressure.     Charges:  Timed Code Treatment Minutes: 50   Total Treatment Minutes: 65       [] EVAL (LOW) 07584 (typically 20 minutes face-to-face)  [] EVAL (MOD) 56917 (typically 30 minutes face-to-face)  [] EVAL (HIGH) 05150 (typically 45 minutes face-to-face)  [] RE-EVAL   [x] JV(54446) x  2   [] IONTO  [x] NMR (42303) x  1   [x] VASO  [] Manual (17884) x      [] Other:  [] TA x      [] Mech Traction (97572)  [] ES(attended) (19647)      [] ES (un) (24958):     GOALS:   Patient stated goal: return to community activities     [] Progressing: [x] Met: [] Not Met: [] Adjusted     Therapist goals for Patient:   Short Term Goals: To be achieved in: 2-4 weeks  1. Independent in HEP and progression per patient tolerance, in order to prevent re-injury. [] Progressing: [x] Met: [] Not Met: [] Adjusted   2. Patient will have a decrease in pain to facilitate improvement in movement, function, and ADLs as indicated by Functional Deficits. [] Progressing: [x] Met: [] Not Met: [] Adjusted     Long Term Goals: To be achieved in: 12 weeks  1. Foto68/100to assist with reaching prior level of function. [x] Progressing: [] Met: [] Not Met: [] Adjusted  2. Patient will demonstrate increased AROM to =R to allow for proper joint functioning as indicated by patients Functional Deficits. [] Progressing: [x] Met: [] Not Met: [] Adjusted  3. Patient will demonstrate an increase in Strength to good proximal hip strength and control  in LE to allow for proper functional mobility as indicated by patients Functional Deficits. [x] Progressing: [] Met: [] Not Met: [] Adjusted  4. Patient will return to normal heel to toe gait in community  ascend descend 12 steps alternating  functional activities without increased symptoms or restriction. [x] Progressing: [] Met: [] Not Met: [] Adjusted  Overall Progression Towards Functional goals/ Treatment Progress Update:  [] Patient is progressing as expected towards functional goals listed. [x] Progression is slowed due to complexities/Impairments listed. [] Progression has been slowed due to co-morbidities.   [] Plan just implemented, too soon to assess goals progression <30days   [] Goals require adjustment due to lack of progress  [] Patient is not progressing as expected and requires additional follow up with physician  [] Other    Prognosis for POC: [x] Good [] Fair  [] Poor      Patient requires continued skilled intervention: [x] Yes  [] No    Treatment/Activity Tolerance:  [x] Patient able to complete treatment  [] Patient limited by fatigue  [] Patient limited by pain     [] Patient limited by other medical complications  [x] Other: Pt tolerated ex this visit with no taping this visit, reporting ? challenge. No pain, but ? challenge. Patient Education:  ITB rolling with foam roller or rolling pin    Access Code: [email or web address]  URL: [employer]/  Date: 11/16/2022  Prepared by: Guy Lorenzo    Program Notes  ITB rolling x3-5 min    Exercises  Long Sitting Quad Set - 1 x daily - 7 x weekly - 10 reps - 10 hold  Active Straight Leg Raise with Quad Set - 1 x daily - 7 x weekly - 3 sets - 10 reps - 10 sec first rep hold  Sidelying Hip Abduction - 1 x daily - 7 x weekly - 3 sets - 10 reps  Clamshell with Resistance - 1 x daily - 7 x weekly - 3 sets - 10 reps  Bridge with Upper Back on Swiss Ball - 1 x daily - 7 x weekly - 3 sets - 10 reps  Heel Raise on Step - 1 x daily - 7 x weekly - 3 sets - 10 reps  Standing Terminal Knee Extension with Resistance - 1 x daily - 7 x weekly - 3 sets - 10 reps - 5 sec hold  Wall Squat with Ball between Knees - 1 x daily - 7 x weekly - 1 sets - 10 reps - 10 sec hold  Single Leg Stance on Foam Pad - 1 x daily - 7 x weekly - 1 sets - 3 reps - 30 sec hold  Seated Long Arc Quad - 1 x daily - 7 x weekly - 2 sets - 10 reps  Prone Quadriceps Stretch - 1 x daily - 7 x weekly - 1 sets - 2 reps - 30 sec hold  Seated Hamstring Stretch - 1 x daily - 7 x weekly - 1 sets - 3 reps - 30 sec hold  Standing Gastroc Stretch on Step - 1 x daily - 7 x weekly - 1 sets - 3 reps - 30 sec hold     PLAN: See eval. Pt will continue x2/wk through Feb and drop to x1/wk  [x] Continue per plan of care [] Alter current plan (see comments above)  [] Plan of care initiated [] Hold pending MD visit [] Discharge      Electronically signed by:  Lillie Back PT    Note: If patient does not return for scheduled/ recommended follow up visits, this note will serve as a discharge from care along with most recent update on progress.

## 2023-02-22 ENCOUNTER — HOSPITAL ENCOUNTER (OUTPATIENT)
Dept: PHYSICAL THERAPY | Age: 53
Setting detail: THERAPIES SERIES
Discharge: HOME | End: 2023-02-22
Payer: COMMERCIAL

## 2023-02-22 PROCEDURE — 97110 THERAPEUTIC EXERCISES: CPT

## 2023-02-22 PROCEDURE — 97112 NEUROMUSCULAR REEDUCATION: CPT

## 2023-02-22 PROCEDURE — 97016 VASOPNEUMATIC DEVICE THERAPY: CPT

## 2023-02-22 NOTE — FLOWSHEET NOTE
The [address] [place], [place] [address], [address], [address] [place] [address]  Phone: [phone]   Fax:     [phone]      Physical Therapy Daily Treatment Note  Date:  2023    Patient Name:  Edgardo Post   \"JT\" :  1970  MRN: [id number]  Restrictions/Precautions:    Medical/Treatment Diagnosis Information:  Diagnosis: Primary osteoarthritis of left knee (M17.12)  Treatment Diagnosis: L knee pain U97.175  Insurance/Certification information:  PT Insurance Information: [facility]  Physician Information:   Niels Akhtar  Has the plan of care been signed (Y/N):        []  Yes  [x]  No     Date of Patient follow up with Physician: per md, mid Oct (not scheduled yet)      Is this a Progress Report:     []  Yes  [x]  No        If Yes:  Date Range for reporting period:  Beginning   Ending     Progress report will be due (10 Rx or 30 days whichever is less):        Recertification will be due (POC Duration  / 90 days whichever is less):          Visit # Insurance Allowable Auth Required   13  37 total 60 []  Yes []  No        Functional Scale: FOTO: 55/100    Date assessed:  23       Latex Allergy:  [x]NO      []YES  Preferred Language for Healthcare:   [x]English       []other:    Pain level:  0/10     SUBJECTIVE:  Pt states he went down the steps not holding on! DOS: 9/15/22    OBJECTIVE:    ROM 0-135 °     Observation: portal sites healing well; pt amb with no crutches;  minimal skin irritation still noted, small area of broken skin (will hold on tape today)  : Trial with Breg lateral stabilization brace today. There was a bit of an adjustment period in the clinic however patella seemed to [facility] in place\" and brace felt good.  Pt to take home with him and continue trial  : pt to keep brace and figured out why it wasn't feeling right    RESTRICTIONS/PRECAUTIONS: acitivty moderation     Exercises/Interventions: HEP updated 11/16  Exercise/Equipment Resistance/Repetitions Other comments   Stretching     Hamstring [id number]    gastroc [id number] slant   Inclined Calf     Hip Flexion     ITB     Groin             EFX x5' Added 2/22        SLR     Supine 3x10 5# ^ 1/4   Abduction 3x10 5# ^ 1/4   Adduction     Prone over table 3x10 4#, added 2/8   SLR+        Side plank clam 3x10 ^ 11/23, BlackTB  ^1/30, silverTB   LAQ single leg 3x10 ^2/20, 30#, added 2/20, TKE block   Isometrics     Patellar Glides     Medial     Superior     Inferior          ROM     , 0-135 ° 22/38, discussed to re-introduce this ex prior to re-taping at home (11/21)   Hang Weights     Passive     Active     Weight Shift                       CKC     Added 9/19, NV   Wall sits with R foot on soccer ball 10\"x10 Changed 1/11, VC small ROM   4\" (single step), up with L, down with L/ up with R, down with R, added 10/3; HOLD 12/14   ^11/23, D/C   Split Squat 3x10 Added 2/8   Single leg dead lift 2x10 10# (1st ex) Added 1/16, significant challenge noted   D/C 2/8   CC TKE 5\"x30 ^ 2/1 CC level 8   Bridges single leg 3x10 Added 2/20   Sidestepping  Monster walk fwd, bwd 3x15'  3x15' Grey loop, added 11/14   LSD  4x10 6\" ^1/30 (hold on for balance)   PRE     Extension  RANGE:   Flexion  RANGE:        Quantum machines     Leg press single leg 3x10 100# Changed 2/15   Leg extension     Leg curl          Manual interventions                   Therapeutic Exercise and NMR EXR  [x] (17187) Provided verbal/tactile cueing for activities related to strengthening, flexibility, endurance, ROM for improvements in LE, proximal hip, and core control with self care, mobility, lifting, ambulation.  [] (01770) Provided verbal/tactile cueing for activities related to improving balance, coordination, kinesthetic sense, posture, motor skill, proprioception  to assist with LE, proximal hip, and core control in self care, mobility, lifting, ambulation and eccentric single leg control.      NMR and Therapeutic Activities:    [] (03024 or 11212) Provided verbal/tactile cueing for activities related to improving balance, coordination, kinesthetic sense, posture, motor skill, proprioception and motor activation to allow for proper function of core, proximal hip and LE with self care and ADLs  [] (75605) Gait Re-education- Provided training and instruction to the patient for proper LE, core and proximal hip recruitment and positioning and eccentric body weight control with ambulation re-education including up and down stairs     Home Exercise Program:    [x] (84767) Reviewed/Progressed HEP activities related to strengthening, flexibility, endurance, ROM of core, proximal hip and LE for functional self-care, mobility, lifting and ambulation/stair navigation   [] (05508)Reviewed/Progressed HEP activities related to improving balance, coordination, kinesthetic sense, posture, motor skill, proprioception of core, proximal hip and LE for self care, mobility, lifting, and ambulation/stair navigation      Manual Treatments:  PROM / STM / Oscillations-Mobs:  G-I, II, III, IV (PA's, Inf., Post.)  [x] (75657) Provided manual therapy to mobilize LE, proximal hip and/or LS spine soft tissue/joints for the purpose of modulating pain, promoting relaxation,  increasing ROM, reducing/eliminating soft tissue swelling/inflammation/restriction, improving soft tissue extensibility and allowing for proper ROM for normal function with self care, mobility, lifting and ambulation. Modalities:  Vasopneumatic Gameready treatment for effusion of L knee and cryotherapy for 15 minutes at medium pressure.     Charges:  Timed Code Treatment Minutes: 50   Total Treatment Minutes: 65       [] EVAL (LOW) 59578 (typically 20 minutes face-to-face)  [] EVAL (MOD) 61161 (typically 30 minutes face-to-face)  [] EVAL (HIGH) 97976 (typically 45 minutes face-to-face)  [] RE-EVAL   [x] QK(05215) x  2   [] IONTO  [x] NMR (02399) x  1   [x] VASO  [] Manual (20298) x      [] Other:  [] TA x      [] Mech Traction (92460)  [] ES(attended) (17272)      [] ES (un) (54538):     GOALS:   Patient stated goal: return to community activities     [] Progressing: [x] Met: [] Not Met: [] Adjusted     Therapist goals for Patient:   Short Term Goals: To be achieved in: 2-4 weeks  1. Independent in HEP and progression per patient tolerance, in order to prevent re-injury. [] Progressing: [x] Met: [] Not Met: [] Adjusted   2. Patient will have a decrease in pain to facilitate improvement in movement, function, and ADLs as indicated by Functional Deficits. [] Progressing: [x] Met: [] Not Met: [] Adjusted     Long Term Goals: To be achieved in: 12 weeks  1. Foto68/100to assist with reaching prior level of function. [x] Progressing: [] Met: [] Not Met: [] Adjusted  2. Patient will demonstrate increased AROM to =R to allow for proper joint functioning as indicated by patients Functional Deficits. [] Progressing: [x] Met: [] Not Met: [] Adjusted  3. Patient will demonstrate an increase in Strength to good proximal hip strength and control  in LE to allow for proper functional mobility as indicated by patients Functional Deficits. [x] Progressing: [] Met: [] Not Met: [] Adjusted  4. Patient will return to normal heel to toe gait in community  ascend descend 12 steps alternating  functional activities without increased symptoms or restriction. [x] Progressing: [] Met: [] Not Met: [] Adjusted  Overall Progression Towards Functional goals/ Treatment Progress Update:  [] Patient is progressing as expected towards functional goals listed. [x] Progression is slowed due to complexities/Impairments listed. [] Progression has been slowed due to co-morbidities.   [] Plan just implemented, too soon to assess goals progression <30days   [] Goals require adjustment due to lack of progress  [] Patient is not progressing as expected and requires additional follow up with physician  [] Other    Prognosis for POC: [x] Good [] Fair  [] Poor      Patient requires continued skilled intervention: [x] Yes  [] No    Treatment/Activity Tolerance:  [x] Patient able to complete treatment  [] Patient limited by fatigue  [] Patient limited by pain     [] Patient limited by other medical complications  [x] Other: Pt tolerated ex this visit with no taping this visit, reporting ? challenge. No pain, but ? challenge. Patient Education:  ITB rolling with foam roller or rolling pin    Access Code: [email or web address]  URL: [employer]/  Date: 11/16/2022  Prepared by: Warren Chase    Program Notes  ITB rolling x3-5 min    Exercises  Long Sitting Quad Set - 1 x daily - 7 x weekly - 10 reps - 10 hold  Active Straight Leg Raise with Quad Set - 1 x daily - 7 x weekly - 3 sets - 10 reps - 10 sec first rep hold  Sidelying Hip Abduction - 1 x daily - 7 x weekly - 3 sets - 10 reps  Clamshell with Resistance - 1 x daily - 7 x weekly - 3 sets - 10 reps  Bridge with Upper Back on Swiss Ball - 1 x daily - 7 x weekly - 3 sets - 10 reps  Heel Raise on Step - 1 x daily - 7 x weekly - 3 sets - 10 reps  Standing Terminal Knee Extension with Resistance - 1 x daily - 7 x weekly - 3 sets - 10 reps - 5 sec hold  Wall Squat with Ball between Knees - 1 x daily - 7 x weekly - 1 sets - 10 reps - 10 sec hold  Single Leg Stance on Foam Pad - 1 x daily - 7 x weekly - 1 sets - 3 reps - 30 sec hold  Seated Long Arc Quad - 1 x daily - 7 x weekly - 2 sets - 10 reps  Prone Quadriceps Stretch - 1 x daily - 7 x weekly - 1 sets - 2 reps - 30 sec hold  Seated Hamstring Stretch - 1 x daily - 7 x weekly - 1 sets - 3 reps - 30 sec hold  Standing Gastroc Stretch on Step - 1 x daily - 7 x weekly - 1 sets - 3 reps - 30 sec hold     PLAN: See eval. Pt will continue x2/wk through Feb and drop to x1/wk  [x] Continue per plan of care [] Alter current plan (see comments above)  [] Plan of care initiated [] Hold pending MD visit [] Discharge      Electronically signed by:  Devendra Orlando PT    Note: If patient does not return for scheduled/ recommended follow up visits, this note will serve as a discharge from care along with most recent update on progress.

## 2023-02-27 ENCOUNTER — HOSPITAL ENCOUNTER (OUTPATIENT)
Dept: PHYSICAL THERAPY | Age: 53
Setting detail: THERAPIES SERIES
Discharge: HOME | End: 2023-02-27
Payer: COMMERCIAL

## 2023-02-27 PROCEDURE — 97110 THERAPEUTIC EXERCISES: CPT

## 2023-02-27 PROCEDURE — 97112 NEUROMUSCULAR REEDUCATION: CPT

## 2023-02-27 NOTE — FLOWSHEET NOTE
The [address] [place] [address],[address], [address], [address] [place] [address]  Phone: [phone]   Fax:     [phone]      Physical Therapy Daily Treatment Note  Date:  2023    Patient Name:  Colin Pike   \"JT\" :  1970  MRN: [id number]  Restrictions/Precautions:    Medical/Treatment Diagnosis Information:  Diagnosis: Primary osteoarthritis of left knee (M17.12)  Treatment Diagnosis: L knee pain N74.539  Insurance/Certification information:  PT Insurance Information: [facility]  Physician Information:   Veronica Martin  Has the plan of care been signed (Y/N):        []  Yes  [x]  No     Date of Patient follow up with Physician: per md, mid Oct (not scheduled yet)      Is this a Progress Report:     []  Yes  [x]  No        If Yes:  Date Range for reporting period:  Beginning   Ending     Progress report will be due (10 Rx or 30 days whichever is less):        Recertification will be due (POC Duration  / 90 days whichever is less):          Visit # Insurance Allowable Auth Required   14  38 total 60 []  Yes []  No        Functional Scale: FOTO: 55/100    Date assessed:  23       Latex Allergy:  [x]NO      []YES  Preferred Language for Healthcare:   [x]English       []other:    Pain level:  0/10     SUBJECTIVE:  Pt did well with his busy Saturday of attending 2 separate sporting events, involving lots of steps. Pt was sore yesterday but not particularly painful     DOS: 9/15/22    OBJECTIVE:    ROM 0-135 °     Observation: portal sites healing well; pt amb with no crutches;  minimal skin irritation still noted, small area of broken skin (will hold on tape today)  : Trial with Breg lateral stabilization brace today. There was a bit of an adjustment period in the clinic however patella seemed to ARTEMIO [facility] in place\" and brace felt good.  Pt to take home with him and continue trial  : pt to keep brace and figured out why it wasn't feeling right    RESTRICTIONS/PRECAUTIONS: acitivty moderation     Exercises/Interventions: HEP updated 11/16  Exercise/Equipment Resistance/Repetitions Other comments   Stretching     Hamstring [id number]    gastroc [id number] slant   Inclined Calf     Hip Flexion     ITB     Groin             EFX x5' Added 2/22        SLR     Supine 3x10 5# ^ 1/4   Abduction 3x10 5# ^ 1/4   Adduction     Prone over table 3x10 5#, added 2/8   SLR+        Side plank clam 3x10 ^ 11/23, BlackTB  ^1/30, silverTB   LAQ single leg 3x10 ^2/20, 30#, added 2/20, TKE block   Isometrics     Patellar Glides     Medial     Superior     Inferior          ROM     , 0-135 ° 22/38, discussed to re-introduce this ex prior to re-taping at home (11/21)   Hang Weights     Passive     Active     Weight Shift                       CKC     Added 9/19, NV   Wall sits with R foot on soccer ball 10\"x10 Changed 1/11, VC small ROM   4\" (single step), up with L, down with L/ up with R, down with R, added 10/3; HOLD 12/14   ^11/23, D/C   Split Squat + MB torso twist 2x10, 4# bilat Changed 2/27   Single leg dead lift 2x10 10# (1st ex) Added 1/16, significant challenge noted   D/C 2/8   CC TKE 5\"x30 ^ 2/1 CC level 8   Bridges single leg 3x10 Added 2/20   Sidestepping  Monster walk fwd, bwd 3x15'  3x15' Grey loop, added 11/14   LSD  3x5 8\" ^2/27 (hold on for balance)   PRE     Extension  RANGE:   Flexion  RANGE:        Quantum machines     Leg press single leg 3x10 100# Changed 2/15   Leg extension     Leg curl          Manual interventions                   Therapeutic Exercise and NMR EXR  [x] (96286) Provided verbal/tactile cueing for activities related to strengthening, flexibility, endurance, ROM for improvements in LE, proximal hip, and core control with self care, mobility, lifting, ambulation.  [] (01433) Provided verbal/tactile cueing for activities related to improving balance, coordination, kinesthetic sense, posture, motor skill, proprioception  to assist with LE, proximal hip, and core control in self care, mobility, lifting, ambulation and eccentric single leg control. NMR and Therapeutic Activities:    [] (38420 or 87761) Provided verbal/tactile cueing for activities related to improving balance, coordination, kinesthetic sense, posture, motor skill, proprioception and motor activation to allow for proper function of core, proximal hip and LE with self care and ADLs  [] (21176) Gait Re-education- Provided training and instruction to the patient for proper LE, core and proximal hip recruitment and positioning and eccentric body weight control with ambulation re-education including up and down stairs     Home Exercise Program:    [x] (01922) Reviewed/Progressed HEP activities related to strengthening, flexibility, endurance, ROM of core, proximal hip and LE for functional self-care, mobility, lifting and ambulation/stair navigation   [] (03233)Reviewed/Progressed HEP activities related to improving balance, coordination, kinesthetic sense, posture, motor skill, proprioception of core, proximal hip and LE for self care, mobility, lifting, and ambulation/stair navigation      Manual Treatments:  PROM / STM / Oscillations-Mobs:  G-I, II, III, IV (PA's, Inf., Post.)  [x] (04917) Provided manual therapy to mobilize LE, proximal hip and/or LS spine soft tissue/joints for the purpose of modulating pain, promoting relaxation,  increasing ROM, reducing/eliminating soft tissue swelling/inflammation/restriction, improving soft tissue extensibility and allowing for proper ROM for normal function with self care, mobility, lifting and ambulation.      Modalities:    Charges:  Timed Code Treatment Minutes: 50   Total Treatment Minutes: 50       [] EVAL (LOW) 95632 (typically 20 minutes face-to-face)  [] EVAL (MOD) 09317 (typically 30 minutes face-to-face)  [] EVAL (HIGH) 61276 (typically 45 minutes face-to-face)  [] RE-EVAL   [x] DX(21717) x  2   [] IONTO  [x] NMR (65132) x  1   [] VASO  [] Manual (27972) x      [] Other:  [] TA x      [] Mech Traction (11607)  [] ES(attended) (11090)      [] ES (un) (04963):     GOALS:   Patient stated goal: return to community activities     [] Progressing: [x] Met: [] Not Met: [] Adjusted     Therapist goals for Patient:   Short Term Goals: To be achieved in: 2-4 weeks  1. Independent in HEP and progression per patient tolerance, in order to prevent re-injury. [] Progressing: [x] Met: [] Not Met: [] Adjusted   2. Patient will have a decrease in pain to facilitate improvement in movement, function, and ADLs as indicated by Functional Deficits. [] Progressing: [x] Met: [] Not Met: [] Adjusted     Long Term Goals: To be achieved in: 12 weeks  1. Foto68/100to assist with reaching prior level of function. [x] Progressing: [] Met: [] Not Met: [] Adjusted  2. Patient will demonstrate increased AROM to =R to allow for proper joint functioning as indicated by patients Functional Deficits. [] Progressing: [x] Met: [] Not Met: [] Adjusted  3. Patient will demonstrate an increase in Strength to good proximal hip strength and control  in LE to allow for proper functional mobility as indicated by patients Functional Deficits. [x] Progressing: [] Met: [] Not Met: [] Adjusted  4. Patient will return to normal heel to toe gait in community  ascend descend 12 steps alternating  functional activities without increased symptoms or restriction. [x] Progressing: [] Met: [] Not Met: [] Adjusted  Overall Progression Towards Functional goals/ Treatment Progress Update:  [] Patient is progressing as expected towards functional goals listed. [x] Progression is slowed due to complexities/Impairments listed. [] Progression has been slowed due to co-morbidities.   [] Plan just implemented, too soon to assess goals progression <30days   [] Goals require adjustment due to lack of progress  [] Patient is not progressing as expected and requires additional follow up with physician  [] Other    Prognosis for POC: [x] Good [] Fair  [] Poor      Patient requires continued skilled intervention: [x] Yes  [] No    Treatment/Activity Tolerance:  [x] Patient able to complete treatment  [] Patient limited by fatigue  [] Patient limited by pain     [] Patient limited by other medical complications  [x] Other: Pt tolerated ex this visit with no taping this visit, reporting ? challenge. No pain, but ? challenge. Patient Education:  ITB rolling with foam roller or rolling pin    Access Code: [email or web address]  URL: [employer]/  Date: 11/16/2022  Prepared by: Gabo Perez    Program Notes  ITB rolling x3-5 min    Exercises  Long Sitting Quad Set - 1 x daily - 7 x weekly - 10 reps - 10 hold  Active Straight Leg Raise with Quad Set - 1 x daily - 7 x weekly - 3 sets - 10 reps - 10 sec first rep hold  Sidelying Hip Abduction - 1 x daily - 7 x weekly - 3 sets - 10 reps  Clamshell with Resistance - 1 x daily - 7 x weekly - 3 sets - 10 reps  Bridge with Upper Back on Swiss Ball - 1 x daily - 7 x weekly - 3 sets - 10 reps  Heel Raise on Step - 1 x daily - 7 x weekly - 3 sets - 10 reps  Standing Terminal Knee Extension with Resistance - 1 x daily - 7 x weekly - 3 sets - 10 reps - 5 sec hold  Wall Squat with Ball between Knees - 1 x daily - 7 x weekly - 1 sets - 10 reps - 10 sec hold  Single Leg Stance on Foam Pad - 1 x daily - 7 x weekly - 1 sets - 3 reps - 30 sec hold  Seated Long Arc Quad - 1 x daily - 7 x weekly - 2 sets - 10 reps  Prone Quadriceps Stretch - 1 x daily - 7 x weekly - 1 sets - 2 reps - 30 sec hold  Seated Hamstring Stretch - 1 x daily - 7 x weekly - 1 sets - 3 reps - 30 sec hold  Standing Gastroc Stretch on Step - 1 x daily - 7 x weekly - 1 sets - 3 reps - 30 sec hold     PLAN: See eval. Pt will continue x2/wk through Feb and drop to x1/wk  [x] Continue per plan of care [] Alter current plan (see comments above)  [] Plan of care initiated [] Hold pending MD visit [] Discharge      Electronically signed by:  Kai Paris PT    Note: If patient does not return for scheduled/ recommended follow up visits, this note will serve as a discharge from care along with most recent update on progress.

## 2023-03-01 ENCOUNTER — HOSPITAL ENCOUNTER (OUTPATIENT)
Dept: PHYSICAL THERAPY | Age: 53
Setting detail: THERAPIES SERIES
Discharge: HOME | End: 2023-03-01
Payer: COMMERCIAL

## 2023-03-01 PROCEDURE — 97110 THERAPEUTIC EXERCISES: CPT

## 2023-03-01 PROCEDURE — 97112 NEUROMUSCULAR REEDUCATION: CPT

## 2023-03-01 NOTE — FLOWSHEET NOTE
The [address] [place] [address] 200, 800 [facility] 3360 [facility], [address] [place] [address]  Phone: [phone]   Fax:     [phone]      Physical Therapy Daily Treatment Note  Date:  3/1/2023    Patient Name:  Rebecca Cope   \"JT\" :  1970  MRN: [id number]  Restrictions/Precautions:    Medical/Treatment Diagnosis Information:  Diagnosis: Primary osteoarthritis of left knee (M17.12)  Treatment Diagnosis: L knee pain J83.120  Insurance/Certification information:  PT Insurance Information: [facility]  Physician Information:   Juan Call  Has the plan of care been signed (Y/N):        []  Yes  [x]  No     Date of Patient follow up with Physician: per md, mid Oct (not scheduled yet)      Is this a Progress Report:     []  Yes  [x]  No        If Yes:  Date Range for reporting period:  Beginning   Ending     Progress report will be due (10 Rx or 30 days whichever is less):        Recertification will be due (POC Duration  / 90 days whichever is less):          Visit # Insurance Allowable Auth Required   15  39 total 60 []  Yes []  No        Functional Scale: FOTO: 55/100    Date assessed:  23       Latex Allergy:  [x]NO      []YES  Preferred Language for Healthcare:   [x]English       []other:    Pain level:  0/10     SUBJECTIVE:  Pt feels less sore than last visit. Did not do ex yesterday. DOS: 9/15/22    OBJECTIVE:    ROM 0-135 °     Observation: portal sites healing well; pt amb with no crutches;  minimal skin irritation still noted, small area of broken skin (will hold on tape today)  : Trial with Breg lateral stabilization brace today. There was a bit of an adjustment period in the clinic however patella seemed to [facility] in place\" and brace felt good.  Pt to take home with him and continue trial  : pt to keep brace and figured out why it wasn't feeling right    RESTRICTIONS/PRECAUTIONS: acitivty moderation Exercises/Interventions: HEP updated 11/16  Exercise/Equipment Resistance/Repetitions Other comments   Stretching     Hamstring [id number]    gastroc [id number] slant   Inclined Calf     Hip Flexion     ITB     Groin             EFX x5' Added 2/22        SLR     Supine 3x10 5# ^ 1/4   Abduction 3x10 5# ^ 1/4   Adduction     Prone over table 3x10 5#, added 2/8   SLR+        Side plank clam 3x10 ^ 11/23, BlackTB  ^1/30, silverTB   LAQ single leg 3x10 ^2/20, 30#, added 2/20, TKE block   Isometrics     Patellar Glides     Medial     Superior     Inferior          ROM     , 0-135 ° 22/38, discussed to re-introduce this ex prior to re-taping at home (11/21)   Hang Weights     Passive     Active     Weight Shift                       CKC     Added 9/19, [place]   Wall sits with R foot on soccer ball 10\"x10 Changed 1/11, VC small ROM   4\" (single step), up with L, down with L/ up with R, down with R, added 10/3; HOLD 12/14   ^11/23, D/C   Split Squat + MB torso twist 2x10, 4# bilat Changed 2/27   Single leg dead lift 2x10 10# (1st ex) Added 1/16, significant challenge noted   D/C 2/8   CC TKE 5\"x30 ^ 2/1 CC level 8   Bridges single leg 3x10 Added 2/20   Sidestepping  Monster walk fwd, bwd 3x15'  3x15' Grey loop, added 11/14   LSD  3x5 6\" 3/1(hold on for balance)   PRE     Extension  RANGE:   Flexion  RANGE:        Quantum machines     Leg press single leg 3x10 100# Changed 2/15   Leg extension     Leg curl          Manual interventions                   Therapeutic Exercise and NMR EXR  [x] (16817) Provided verbal/tactile cueing for activities related to strengthening, flexibility, endurance, ROM for improvements in LE, proximal hip, and core control with self care, mobility, lifting, ambulation.  [] (08095) Provided verbal/tactile cueing for activities related to improving balance, coordination, kinesthetic sense, posture, motor skill, proprioception  to assist with LE, proximal hip, and core control in self care, mobility, lifting, ambulation and eccentric single leg control. NMR and Therapeutic Activities:    [] (31120 or 23296) Provided verbal/tactile cueing for activities related to improving balance, coordination, kinesthetic sense, posture, motor skill, proprioception and motor activation to allow for proper function of core, proximal hip and LE with self care and ADLs  [] (80473) Gait Re-education- Provided training and instruction to the patient for proper LE, core and proximal hip recruitment and positioning and eccentric body weight control with ambulation re-education including up and down stairs     Home Exercise Program:    [x] (06520) Reviewed/Progressed HEP activities related to strengthening, flexibility, endurance, ROM of core, proximal hip and LE for functional self-care, mobility, lifting and ambulation/stair navigation   [] (10190)Reviewed/Progressed HEP activities related to improving balance, coordination, kinesthetic sense, posture, motor skill, proprioception of core, proximal hip and LE for self care, mobility, lifting, and ambulation/stair navigation      Manual Treatments:  PROM / STM / Oscillations-Mobs:  G-I, II, III, IV (PA's, Inf., Post.)  [x] (27673) Provided manual therapy to mobilize LE, proximal hip and/or LS spine soft tissue/joints for the purpose of modulating pain, promoting relaxation,  increasing ROM, reducing/eliminating soft tissue swelling/inflammation/restriction, improving soft tissue extensibility and allowing for proper ROM for normal function with self care, mobility, lifting and ambulation.      Modalities:    Charges:  Timed Code Treatment Minutes: 50   Total Treatment Minutes: 50       [] EVAL (LOW) 44192 (typically 20 minutes face-to-face)  [] EVAL (MOD) 12327 (typically 30 minutes face-to-face)  [] EVAL (HIGH) 68052 (typically 45 minutes face-to-face)  [] RE-EVAL   [x] ER(34150) x  2   [] IONTO  [x] NMR (80666) x  1   [] VASO  [] Manual (05500) x      [] Other:  [] TA x      [] [facility] Traction (62044)  [] ES(attended) (72727)      [] ES (un) (78848):     GOALS:   Patient stated goal: return to community activities     [] Progressing: [x] Met: [] Not Met: [] Adjusted     Therapist goals for Patient:   Short Term Goals: To be achieved in: 2-4 weeks  1. Independent in HEP and progression per patient tolerance, in order to prevent re-injury. [] Progressing: [x] Met: [] Not Met: [] Adjusted   2. Patient will have a decrease in pain to facilitate improvement in movement, function, and ADLs as indicated by Functional Deficits. [] Progressing: [x] Met: [] Not Met: [] Adjusted     Long Term Goals: To be achieved in: 12 weeks  1. Foto68/100to assist with reaching prior level of function. [x] Progressing: [] Met: [] Not Met: [] Adjusted  2. Patient will demonstrate increased AROM to =R to allow for proper joint functioning as indicated by patients Functional Deficits. [] Progressing: [x] Met: [] Not Met: [] Adjusted  3. Patient will demonstrate an increase in Strength to good proximal hip strength and control  in LE to allow for proper functional mobility as indicated by patients Functional Deficits. [x] Progressing: [] Met: [] Not Met: [] Adjusted  4. Patient will return to normal heel to toe gait in community  ascend descend 12 steps alternating  functional activities without increased symptoms or restriction. [x] Progressing: [] Met: [] Not Met: [] Adjusted  Overall Progression Towards Functional goals/ Treatment Progress Update:  [] Patient is progressing as expected towards functional goals listed. [x] Progression is slowed due to complexities/Impairments listed. [] Progression has been slowed due to co-morbidities.   [] Plan just implemented, too soon to assess goals progression <30days   [] Goals require adjustment due to lack of progress  [] Patient is not progressing as expected and requires additional follow up with physician  [] Other    Prognosis for POC: [x] Good [] Fair  [] Poor      Patient requires continued skilled intervention: [x] Yes  [] No    Treatment/Activity Tolerance:  [x] Patient able to complete treatment  [] Patient limited by fatigue  [] Patient limited by pain     [] Patient limited by other medical complications  [x] Other: Pt tolerated all ex this visit however slight ? soreness than usual as of late. Pt continues to be able to tolerate ? load however deficits remain as he does have pain and limitations with descending steps. Patient Education:  ITB rolling with foam roller or rolling pin    Access Code: [email or web address]  URL: [email or web address]  Date: 11/16/2022  Prepared by: Jose Worthy    Program Notes  ITB rolling x3-5 min    Exercises  Long Sitting Quad Set - 1 x daily - 7 x weekly - 10 reps - 10 hold  Active Straight Leg Raise with Quad Set - 1 x daily - 7 x weekly - 3 sets - 10 reps - 10 sec first rep hold  Sidelying Hip Abduction - 1 x daily - 7 x weekly - 3 sets - 10 reps  Clamshell with Resistance - 1 x daily - 7 x weekly - 3 sets - 10 reps  Bridge with Upper Back on Swiss Ball - 1 x daily - 7 x weekly - 3 sets - 10 reps  Heel Raise on Step - 1 x daily - 7 x weekly - 3 sets - 10 reps  Standing Terminal Knee Extension with Resistance - 1 x daily - 7 x weekly - 3 sets - 10 reps - 5 sec hold  Wall Squat with Ball between Knees - 1 x daily - 7 x weekly - 1 sets - 10 reps - 10 sec hold  Single Leg Stance on Foam Pad - 1 x daily - 7 x weekly - 1 sets - 3 reps - 30 sec hold  Seated Long Arc Quad - 1 x daily - 7 x weekly - 2 sets - 10 reps  Prone Quadriceps Stretch - 1 x daily - 7 x weekly - 1 sets - 2 reps - 30 sec hold  Seated Hamstring Stretch - 1 x daily - 7 x weekly - 1 sets - 3 reps - 30 sec hold  Standing Gastroc Stretch on Step - 1 x daily - 7 x weekly - 1 sets - 3 reps - 30 sec hold     PLAN: See eval. Pt will continue x2/wk through Feb and drop to x1/wk  [x] Continue per plan of care [] Alter current plan (see comments above)  [] Plan of care initiated [] Hold pending MD visit [] Discharge      Electronically signed by:  Joseph Garcia PT    Note: If patient does not return for scheduled/ recommended follow up visits, this note will serve as a discharge from care along with most recent update on progress.

## 2023-03-08 ENCOUNTER — HOSPITAL ENCOUNTER (OUTPATIENT)
Dept: PHYSICAL THERAPY | Age: 53
Setting detail: THERAPIES SERIES
Discharge: HOME | End: 2023-03-08
Payer: COMMERCIAL

## 2023-03-08 PROCEDURE — 97110 THERAPEUTIC EXERCISES: CPT

## 2023-03-08 PROCEDURE — 97112 NEUROMUSCULAR REEDUCATION: CPT

## 2023-03-08 NOTE — PLAN OF CARE
The [address] [place] [address],[address], 519 [facility] 3360 [facility], [address] [place] [address]  Phone: [phone]   Fax:     [phone]     Physical Therapy Re-Certification Plan of Care    Dear  Dr. Mali Le,    We had the pleasure of treating the following patient for physical therapy services at [address] [place] [address]. A summary of our findings can be found in the updated assessment below. This includes our plan of care. If you have any questions or concerns regarding these findings, please do not hesitate to contact me at the office phone number checked above. Thank you for the referral.     Physician Signature:________________________________Date:__________________  By signing above (or electronic signature), therapists plan is approved by physician    Total Visits to Date: 36  Overall Response to Treatment:   []Patient is responding well to treatment and improvement is noted with regards  to goals   []Patient should continue to improve in reasonable time if they continue HEP   []Patient has plateaued and is no longer responding to skilled PT intervention    []Patient is getting worse and would benefit from return to referring MD   []Patient unable to adhere to initial POC   [x]Other: Pt continues to make objective strength gains as he demonstrates improving tolerance to quad loading ex. Pt exhibited significantly impaired VMO activation and that has drastically improved to allow pt to descend stair without pain. Pt continues to exhibit strength deficits requiring cont PT however pt has come a long way.  ROM remains normal.     Physical Therapy Daily Treatment Note  Date:  3/8/2023    Patient Name:  Indira Ceja   \"JT\" :  1970  MRN: [id number]  Restrictions/Precautions:    Medical/Treatment Diagnosis Information:  Diagnosis: Primary osteoarthritis of left knee (M17.12)  Treatment Diagnosis: L knee pain M67.322  Insurance/Certification information:  PT Insurance Information: [facility]  Physician Information:   Mike Rodriguez  Has the plan of care been signed (Y/N):        [x]  Yes  []  No     Date of Patient follow up with Physician: per md, mid Oct (not scheduled yet)      Is this a Progress Report:     [x]  Yes  []  No        If Yes:  Date Range for reporting period:  Beginning 9/16/22  Ending 3/8/23    Progress report will be due (10 Rx or 30 days whichever is less): 2/6/11      Recertification will be due (POC Duration  / 90 days whichever is less): 6/8/23        Visit # Insurance Allowable Auth Required   16  40 total 60 []  Yes []  No        Functional Scale: FOTO: 55/100    Date assessed:  2/8/23       Latex Allergy:  [x]NO      []YES  Preferred Language for Healthcare:   [x]English       []other:    Pain level:  0/10     SUBJECTIVE:  Pt reports continued improving steps (descending) as well as ? aching feeling. Pt feels overall 60% improvement. DOS: 9/15/22    OBJECTIVE:   3/8: L knee ROM 0-135 °   , knee ext: 4/5, knee flex 4+/5  Observation: portal sites healing well; pt amb with no crutches; 11/28 minimal skin irritation still noted, small area of broken skin (will hold on tape today)  11/28: Trial with Breg lateral stabilization brace today. There was a bit of an adjustment period in the clinic however patella seemed to [facility] in place\" and brace felt good.  Pt to take home with him and continue trial  12/28: pt to keep brace and figured out why it wasn't feeling right    RESTRICTIONS/PRECAUTIONS: acitivty moderation     Exercises/Interventions: HEP updated 11/16  Exercise/Equipment Resistance/Repetitions Other comments   Stretching     NV (pt had to cut visit shorter)   Slant, NV (pt had to cut visit shorter)   Inclined Calf     Hip Flexion     ITB     Groin             EFX x5' Added 2/22        SLR     Supine 3x10 5# ^ 1/4   Abduction 3x10 5# ^ 1/4   Adduction     Prone over table 3x10 5#, added 2/8   SLR+        ^ 11/23, BlackTB  ^1/30, silverTB, [place] (pt had to cut visit shorter)   LAQ single leg 3x10 ^2/20, 30#, added 2/20, TKE block   Isometrics     Patellar Glides     Medial     Superior     Inferior          ROM     , 0-135 ° 22/38, discussed to re-introduce this ex prior to re-taping at home (11/21)   Hang Weights     Passive     Active     Weight Shift                       CKC     Added 9/19, [place]   Changed 1/11, VC small ROM   4\" (single step), up with L, down with L/ up with R, down with R, added 10/3; HOLD 12/14   ^11/23, D/C   Split Squat + MB torso twist 2x10, 4# bilat Changed 2/27   Single leg dead lift 2x10 10# (1st ex) Added 1/16, significant challenge noted   D/C 2/8   CC TKE 5\"x30 ^ 2/1 CC level 8   Bridges single leg 3x10 Added 2/20   Sidestepping  Monster walk fwd, bwd 3x15'  3x15' Grey loop, added 11/14   LSD  3x5 6\" 3/1(hold on for balance)   PRE     Extension  RANGE:   Flexion  RANGE:        Quantum machines     Leg press single leg 3x10 100# Changed 2/15   Leg extension     Leg curl          Manual interventions                   Therapeutic Exercise and NMR EXR  [x] (97862) Provided verbal/tactile cueing for activities related to strengthening, flexibility, endurance, ROM for improvements in LE, proximal hip, and core control with self care, mobility, lifting, ambulation.  [] (67935) Provided verbal/tactile cueing for activities related to improving balance, coordination, kinesthetic sense, posture, motor skill, proprioception  to assist with LE, proximal hip, and core control in self care, mobility, lifting, ambulation and eccentric single leg control.      NMR and Therapeutic Activities:    [] (15395 or 60418) Provided verbal/tactile cueing for activities related to improving balance, coordination, kinesthetic sense, posture, motor skill, proprioception and motor activation to allow for proper function of core, proximal hip and LE with self care and ADLs  [] (34210) Gait Re-education- Provided training and instruction to the patient for proper LE, core and proximal hip recruitment and positioning and eccentric body weight control with ambulation re-education including up and down stairs     Home Exercise Program:    [x] (35216) Reviewed/Progressed HEP activities related to strengthening, flexibility, endurance, ROM of core, proximal hip and LE for functional self-care, mobility, lifting and ambulation/stair navigation   [] (77132)Reviewed/Progressed HEP activities related to improving balance, coordination, kinesthetic sense, posture, motor skill, proprioception of core, proximal hip and LE for self care, mobility, lifting, and ambulation/stair navigation      Manual Treatments:  PROM / STM / Oscillations-Mobs:  G-I, II, III, IV (PA's, Inf., Post.)  [x] (87276) Provided manual therapy to mobilize LE, proximal hip and/or LS spine soft tissue/joints for the purpose of modulating pain, promoting relaxation,  increasing ROM, reducing/eliminating soft tissue swelling/inflammation/restriction, improving soft tissue extensibility and allowing for proper ROM for normal function with self care, mobility, lifting and ambulation. Modalities:    Charges:  Timed Code Treatment Minutes: 45   Total Treatment Minutes: 45       [] EVAL (LOW) 15905 (typically 20 minutes face-to-face)  [] EVAL (MOD) 33980 (typically 30 minutes face-to-face)  [] EVAL (HIGH) 81647 (typically 45 minutes face-to-face)  [] RE-EVAL   [x] UY(43836) x  2   [] IONTO  [x] NMR (94575) x  1   [] VASO  [] Manual (17954) x      [] Other:  [] TA x      [] Mech Traction (53882)  [] ES(attended) (76003)      [] ES (un) (60480):     GOALS:   Patient stated goal: return to community activities     [] Progressing: [x] Met: [] Not Met: [] Adjusted     Therapist goals for Patient:   Short Term Goals: To be achieved in: 2-4 weeks  1. Independent in HEP and progression per patient tolerance, in order to prevent re-injury.      [] Progressing: [x] Met: [] Not Met: [] Adjusted   2. Patient will have a decrease in pain to facilitate improvement in movement, function, and ADLs as indicated by Functional Deficits. [] Progressing: [x] Met: [] Not Met: [] Adjusted     Long Term Goals: To be achieved in: 12 weeks  1. Foto68/100to assist with reaching prior level of function. [x] Progressing: [] Met: [] Not Met: [] Adjusted  2. Patient will demonstrate increased AROM to =R to allow for proper joint functioning as indicated by patients Functional Deficits. [] Progressing: [x] Met: [] Not Met: [] Adjusted  3. Patient will demonstrate an increase in Strength to good proximal hip strength and control  in LE to allow for proper functional mobility as indicated by patients Functional Deficits. [x] Progressing: [] Met: [] Not Met: [] Adjusted  4. Patient will return to normal heel to toe gait in community  ascend descend 12 steps alternating  functional activities without increased symptoms or restriction. [x] Progressing: [] Met: [] Not Met: [] Adjusted  Overall Progression Towards Functional goals/ Treatment Progress Update:  [] Patient is progressing as expected towards functional goals listed. [x] Progression is slowed due to complexities/Impairments listed. [] Progression has been slowed due to co-morbidities. [] Plan just implemented, too soon to assess goals progression <30days   [] Goals require adjustment due to lack of progress  [] Patient is not progressing as expected and requires additional follow up with physician  [] Other    Prognosis for POC: [x] Good [] Fair  [] Poor      Patient requires continued skilled intervention: [x] Yes  [] No    Treatment/Activity Tolerance:  [x] Patient able to complete treatment  [] Patient limited by fatigue  [] Patient limited by pain     [] Patient limited by other medical complications  [] Other:    Patient Education:    Access Code: [email or web address]  URL: [email or web address]  Date: 11/16/2022  Prepared by: Marcie Wen [address]    Program Notes  ITB rolling x3-5 min    Exercises  Long Sitting Quad Set - 1 x daily - 7 x weekly - 10 reps - 10 hold  Active Straight Leg Raise with Quad Set - 1 x daily - 7 x weekly - 3 sets - 10 reps - 10 sec first rep hold  Sidelying Hip Abduction - 1 x daily - 7 x weekly - 3 sets - 10 reps  Clamshell with Resistance - 1 x daily - 7 x weekly - 3 sets - 10 reps  Bridge with Upper Back on Swiss Ball - 1 x daily - 7 x weekly - 3 sets - 10 reps  Heel Raise on Step - 1 x daily - 7 x weekly - 3 sets - 10 reps  Standing Terminal Knee Extension with Resistance - 1 x daily - 7 x weekly - 3 sets - 10 reps - 5 sec hold  Wall Squat with Ball between Knees - 1 x daily - 7 x weekly - 1 sets - 10 reps - 10 sec hold  Single Leg Stance on Foam Pad - 1 x daily - 7 x weekly - 1 sets - 3 reps - 30 sec hold  Seated Long Arc Quad - 1 x daily - 7 x weekly - 2 sets - 10 reps  Prone Quadriceps Stretch - 1 x daily - 7 x weekly - 1 sets - 2 reps - 30 sec hold  Seated Hamstring Stretch - 1 x daily - 7 x weekly - 1 sets - 3 reps - 30 sec hold  Standing Gastroc Stretch on Step - 1 x daily - 7 x weekly - 1 sets - 3 reps - 30 sec hold     PLAN: See eval. Pt will continue x2/wk through Feb and drop to x1/wk  [x] Continue per plan of care [] Alter current plan (see comments above)  [] Plan of care initiated [] Hold pending MD visit [] Discharge      Electronically signed by:  Hui Feliciano PT    Note: If patient does not return for scheduled/ recommended follow up visits, this note will serve as a discharge from care along with most recent update on progress.

## 2023-03-13 ENCOUNTER — HOSPITAL ENCOUNTER (OUTPATIENT)
Dept: PHYSICAL THERAPY | Age: 53
Setting detail: THERAPIES SERIES
Discharge: HOME | End: 2023-03-13
Payer: COMMERCIAL

## 2023-03-13 PROCEDURE — 97112 NEUROMUSCULAR REEDUCATION: CPT

## 2023-03-13 PROCEDURE — 97110 THERAPEUTIC EXERCISES: CPT

## 2023-03-13 NOTE — FLOWSHEET NOTE
The [address] [place] [address],[address], [address], [address] [place] [address]  Phone: [phone]   Fax:     [phone]      Physical Therapy Daily Treatment Note  Date:  3/13/2023    Patient Name:  Magdalena Mack   \"JT\" :  1970  MRN: [id number]  Restrictions/Precautions:    Medical/Treatment Diagnosis Information:  Diagnosis: Primary osteoarthritis of left knee (M17.12)  Treatment Diagnosis: L knee pain G97.153  Insurance/Certification information:  PT Insurance Information: [facility]  Physician Information:   Raquel Comment  Has the plan of care been signed (Y/N):        [x]  Yes  []  No     Date of Patient follow up with Physician: per md, mid Oct (not scheduled yet)      Is this a Progress Report:     []  Yes  [x]  No        If Yes:  Date Range for reporting period:  Beginning 22  Ending 3/8/23    Progress report will be due (10 Rx or 30 days whichever is less):       Recertification will be due (POC Duration  / 90 days whichever is less): 23        Visit # Insurance Allowable Auth Required   17  41 total 60 []  Yes []  No        Functional Scale: FOTO: 55/100    Date assessed:  23       Latex Allergy:  [x]NO      []YES  Preferred Language for Healthcare:   [x]English       []other:    Pain level:  0/10     SUBJECTIVE:  Pt reports his knee locked out on him once going down the steps on Saturday, hadn't done that in awhile. DOS: 9/15/22    OBJECTIVE:   3/8: L knee ROM 0-135 °   , knee ext: 4/5, knee flex 4+/5  Observation: portal sites healing well; pt amb with no crutches;  minimal skin irritation still noted, small area of broken skin (will hold on tape today)  : Trial with Breg lateral stabilization brace today. There was a bit of an adjustment period in the clinic however patella seemed to ARTEMIO [facility] in place\" and brace felt good.  Pt to take home with him and continue trial  : pt to keep brace and figured out why it wasn't feeling right    RESTRICTIONS/PRECAUTIONS: acitivty moderation     Exercises/Interventions:   Exercise/Equipment Resistance/Repetitions Other comments   Stretching     Hamstring [id number] Long sitting on table   Towel Pull gastroc [id number] Slant, NV    Inclined Calf     Hip Flexion     ITB     Groin             EFX x5' Added 2/22        SLR     Supine 3x10 5# ^ 1/4   Abduction 3x10 5# ^ 1/4   Adduction     Prone over table 3x10 5#, added 2/8   SLR+        ^ 11/23, BlackTB  ^1/30, silverTB, NV (pt had to cut visit shorter)   LAQ single leg 3x10 ^2/20, 30#, added 2/20, TKE block   Isometrics     Patellar Glides     Medial     Superior     Inferior          ROM     , 0-135 ° 22/38, discussed to re-introduce this ex prior to re-taping at home (11/21)   Hang Weights     Passive     Active     Weight Shift                       CKC     Added 9/19, NV   Changed 1/11, VC small ROM   4\" (single step), up with L, down with L/ up with R, down with R, added 10/3; HOLD 12/14   ^11/23, D/C   Split Squat + MB torso twist 2x10, 4# bilat Changed 2/27   Single leg dead lift 2x10 10# (1st ex) Added 1/16, significant challenge noted   D/C 2/8   CC TKE 5\"x30 ^ 2/1 CC level 8   Bridges single leg 3x10 Added 2/20   Sidestepping  Monster walk fwd, bwd 3x15'  3x15' Grey loop, added 11/14   Running man 3x10 8\", added 3/13   6\" 3/1(hold on for balance)   PRE     Extension  RANGE:   Flexion  RANGE:        Quantum machines     Leg press single leg 3x10 105# ^3/13   Leg extension     Leg curl          Manual interventions                   Therapeutic Exercise and NMR EXR  [x] (64250) Provided verbal/tactile cueing for activities related to strengthening, flexibility, endurance, ROM for improvements in LE, proximal hip, and core control with self care, mobility, lifting, ambulation.  [] (94846) Provided verbal/tactile cueing for activities related to improving balance, coordination, kinesthetic sense, posture, motor skill, proprioception  to assist with LE, proximal hip, and core control in self care, mobility, lifting, ambulation and eccentric single leg control.     NMR and Therapeutic Activities:    [] (79158 or 58875) Provided verbal/tactile cueing for activities related to improving balance, coordination, kinesthetic sense, posture, motor skill, proprioception and motor activation to allow for proper function of core, proximal hip and LE with self care and ADLs  [] (51299) Gait Re-education- Provided training and instruction to the patient for proper LE, core and proximal hip recruitment and positioning and eccentric body weight control with ambulation re-education including up and down stairs     Home Exercise Program:    [x] (26159) Reviewed/Progressed HEP activities related to strengthening, flexibility, endurance, ROM of core, proximal hip and LE for functional self-care, mobility, lifting and ambulation/stair navigation   [] (43136)Reviewed/Progressed HEP activities related to improving balance, coordination, kinesthetic sense, posture, motor skill, proprioception of core, proximal hip and LE for self care, mobility, lifting, and ambulation/stair navigation      Manual Treatments:  PROM / STM / Oscillations-Mobs:  G-I, II, III, IV (PA's, Inf., Post.)  [x] (31179) Provided manual therapy to mobilize LE, proximal hip and/or LS spine soft tissue/joints for the purpose of modulating pain, promoting relaxation,  increasing ROM, reducing/eliminating soft tissue swelling/inflammation/restriction, improving soft tissue extensibility and allowing for proper ROM for normal function with self care, mobility, lifting and ambulation.     Modalities:    Charges:  Timed Code Treatment Minutes: 45   Total Treatment Minutes: 45       [] EVAL (LOW) 13782 (typically 20 minutes face-to-face)  [] EVAL (MOD) 49514 (typically 30 minutes face-to-face)  [] EVAL (HIGH) 62874 (typically 45 minutes face-to-face)  [] RE-EVAL   [x] TE(45627) x  2   [] IONTO  [x] NMR  (40159) x  1   [] VASO  [] Manual (57199) x      [] Other:  [] TA x      [] Mech Traction (05535)  [] ES(attended) (62473)      [] ES (un) (99769):     GOALS:   Patient stated goal: return to community activities     [] Progressing: [x] Met: [] Not Met: [] Adjusted     Therapist goals for Patient:   Short Term Goals: To be achieved in: 2-4 weeks  1. Independent in HEP and progression per patient tolerance, in order to prevent re-injury. [] Progressing: [x] Met: [] Not Met: [] Adjusted   2. Patient will have a decrease in pain to facilitate improvement in movement, function, and ADLs as indicated by Functional Deficits. [] Progressing: [x] Met: [] Not Met: [] Adjusted     Long Term Goals: To be achieved in: 12 weeks  1. Foto68/100to assist with reaching prior level of function. [x] Progressing: [] Met: [] Not Met: [] Adjusted  2. Patient will demonstrate increased AROM to =R to allow for proper joint functioning as indicated by patients Functional Deficits. [] Progressing: [x] Met: [] Not Met: [] Adjusted  3. Patient will demonstrate an increase in Strength to good proximal hip strength and control  in LE to allow for proper functional mobility as indicated by patients Functional Deficits. [x] Progressing: [] Met: [] Not Met: [] Adjusted  4. Patient will return to normal heel to toe gait in community  ascend descend 12 steps alternating  functional activities without increased symptoms or restriction. [x] Progressing: [] Met: [] Not Met: [] Adjusted  Overall Progression Towards Functional goals/ Treatment Progress Update:  [] Patient is progressing as expected towards functional goals listed. [x] Progression is slowed due to complexities/Impairments listed. [] Progression has been slowed due to co-morbidities.   [] Plan just implemented, too soon to assess goals progression <30days   [] Goals require adjustment due to lack of progress  [] Patient is not progressing as expected and requires additional follow up with physician  [] Other    Prognosis for POC: [x] Good [] Fair  [] Poor      Patient requires continued skilled intervention: [x] Yes  [] No    Treatment/Activity Tolerance:  [x] Patient able to complete treatment  [] Patient limited by fatigue  [] Patient limited by pain     [] Patient limited by other medical complications  [] Other:    Patient Education:    Access Code: [email or web address]  URL: [facility] [employer] [facility] [facility]/  Date: 11/16/2022  Prepared by: Nirmala Wilder    Program Notes  ITB rolling x3-5 min    Exercises  Long Sitting Quad Set - 1 x daily - 7 x weekly - 10 reps - 10 hold  Active Straight Leg Raise with Quad Set - 1 x daily - 7 x weekly - 3 sets - 10 reps - 10 sec first rep hold  Sidelying Hip Abduction - 1 x daily - 7 x weekly - 3 sets - 10 reps  Clamshell with Resistance - 1 x daily - 7 x weekly - 3 sets - 10 reps  Bridge with Upper Back on Swiss Ball - 1 x daily - 7 x weekly - 3 sets - 10 reps  Heel Raise on Step - 1 x daily - 7 x weekly - 3 sets - 10 reps  Standing Terminal Knee Extension with Resistance - 1 x daily - 7 x weekly - 3 sets - 10 reps - 5 sec hold  Wall Squat with Ball between Knees - 1 x daily - 7 x weekly - 1 sets - 10 reps - 10 sec hold  Single Leg Stance on Foam Pad - 1 x daily - 7 x weekly - 1 sets - 3 reps - 30 sec hold  Seated Long Arc Quad - 1 x daily - 7 x weekly - 2 sets - 10 reps  Prone Quadriceps Stretch - 1 x daily - 7 x weekly - 1 sets - 2 reps - 30 sec hold  Seated Hamstring Stretch - 1 x daily - 7 x weekly - 1 sets - 3 reps - 30 sec hold  Standing Gastroc Stretch on Step - 1 x daily - 7 x weekly - 1 sets - 3 reps - 30 sec hold     PLAN: See eval. Pt will continue x2/wk through Feb and drop to x1/wk  [x] Continue per plan of care [] Alter current plan (see comments above)  [] Plan of care initiated [] Hold pending MD visit [] Discharge      Electronically signed by:  Martínez Green, PT    Note: If patient does not return for scheduled/ recommended follow up visits, this note will serve as a discharge from care along with most recent update on progress.

## 2023-03-15 ENCOUNTER — HOSPITAL ENCOUNTER (OUTPATIENT)
Dept: PHYSICAL THERAPY | Age: 53
Setting detail: THERAPIES SERIES
Discharge: HOME | End: 2023-03-15
Payer: COMMERCIAL

## 2023-03-15 PROCEDURE — 97110 THERAPEUTIC EXERCISES: CPT

## 2023-03-15 PROCEDURE — 97112 NEUROMUSCULAR REEDUCATION: CPT

## 2023-03-15 PROCEDURE — 97016 VASOPNEUMATIC DEVICE THERAPY: CPT

## 2023-03-15 NOTE — FLOWSHEET NOTE
The [address] [place], [place] [address], [address], [address] [place] [address]  Phone: [phone]   Fax:     [phone]      Physical Therapy Daily Treatment Note  Date:  3/15/2023    Patient Name:  Adwoa Rodriguez   \"JT\" :  1970  MRN: [id number]  Restrictions/Precautions:    Medical/Treatment Diagnosis Information:  Diagnosis: Primary osteoarthritis of left knee (M17.12)  Treatment Diagnosis: L knee pain Q89.726  Insurance/Certification information:  PT Insurance Information: [employer]  Physician Information:   Becky Burnham  Has the plan of care been signed (Y/N):        [x]  Yes  []  No     Date of Patient follow up with Physician: per md, mid Oct (not scheduled yet)      Is this a Progress Report:     []  Yes  [x]  No        If Yes:  Date Range for reporting period:  Beginning 22  Ending 3/8/23    Progress report will be due (10 Rx or 30 days whichever is less): 7/3/33      Recertification will be due (POC Duration  / 90 days whichever is less): 23        Visit # Insurance Allowable Auth Required   18  42 total 60 []  Yes []  No        Functional Scale: FOTO: 55/100    Date assessed:  23       Latex Allergy:  [x]NO      []YES  Preferred Language for Healthcare:   [x]English       []other:    Pain level:  0/10     SUBJECTIVE:  Pt reports nothing new today. DOS: 9/15/22    OBJECTIVE:   3/8: L knee ROM 0-135 °   , knee ext: 4/5, knee flex 4+/5  Observation: portal sites healing well; pt amb with no crutches;  minimal skin irritation still noted, small area of broken skin (will hold on tape today)  : Trial with Breg lateral stabilization brace today. There was a bit of an adjustment period in the clinic however patella seemed to ARTEMIO [facility] in place\" and brace felt good.  Pt to take home with him and continue trial  : pt to keep brace and figured out why it wasn't feeling right    RESTRICTIONS/PRECAUTIONS: acitivty moderation     Exercises/Interventions:   Exercise/Equipment Resistance/Repetitions Other comments   Stretching     Hamstring [id number] Long sitting on table   Towel Pull gastroc [id number] Slant    Inclined Calf     Hip Flexion     ITB     Groin             EFX x5' Added 2/22        SLR     Supine 3x10 5# ^ 1/4   Abduction 3x10 5# ^ 1/4   Adduction     SLR+        Side plank clam 3x10 ^ 11/23, BlackTB  ^1/30, silverTB   LAQ single leg 3x10 ^3/15, 35#, TKE block   Isometrics     Patellar Glides     Medial     Superior     Inferior          ROM     , 0-135 ° 22/38, discussed to re-introduce this ex prior to re-taping at home (11/21)   Hang Weights     Passive     Active     Weight Shift                       CKC     Added 9/19, NV   Wall sits on SB 10\"x10 Changed 1/11, VC small ROM   4\" (single step), up with L, down with L/ up with R, down with R, added 10/3; HOLD 12/14   ^11/23, D/C   Split Squat + MB torso twist 2x10, 4# bilat Changed 2/27   Single leg dead lift X10 + 6 10# (1st ex) Added 1/16, significant challenge noted   D/C 2/8   ^ 2/1 CC level 8   Balance on [employer] board 30\" x 3 3/15   Bridges single leg 3x10 Added 2/20   Sidestepping  Monster walk fwd, bwd 3x15'  3x15' Grey loop, added 11/14   Running man 3x10 8\", added 3/13   6\" 3/1(hold on for balance)   PRE     Extension  RANGE:   Flexion  RANGE:        Quantum machines     Leg press single leg 3x10 105# ^3/13   Leg extension     Leg curl          Manual interventions                   Therapeutic Exercise and NMR EXR  [x] (88295) Provided verbal/tactile cueing for activities related to strengthening, flexibility, endurance, ROM for improvements in LE, proximal hip, and core control with self care, mobility, lifting, ambulation.  [] (67671) Provided verbal/tactile cueing for activities related to improving balance, coordination, kinesthetic sense, posture, motor skill, proprioception  to assist with LE, proximal hip, and core control in self care, mobility, lifting,  ambulation and eccentric single leg control. NMR and Therapeutic Activities:    [] (97004 or 95488) Provided verbal/tactile cueing for activities related to improving balance, coordination, kinesthetic sense, posture, motor skill, proprioception and motor activation to allow for proper function of core, proximal hip and LE with self care and ADLs  [] (34203) Gait Re-education- Provided training and instruction to the patient for proper LE, core and proximal hip recruitment and positioning and eccentric body weight control with ambulation re-education including up and down stairs     Home Exercise Program:    [x] (55858) Reviewed/Progressed HEP activities related to strengthening, flexibility, endurance, ROM of core, proximal hip and LE for functional self-care, mobility, lifting and ambulation/stair navigation   [] (31803)Reviewed/Progressed HEP activities related to improving balance, coordination, kinesthetic sense, posture, motor skill, proprioception of core, proximal hip and LE for self care, mobility, lifting, and ambulation/stair navigation      Manual Treatments:  PROM / STM / Oscillations-Mobs:  G-I, II, III, IV (PA's, Inf., Post.)  [x] (72202) Provided manual therapy to mobilize LE, proximal hip and/or LS spine soft tissue/joints for the purpose of modulating pain, promoting relaxation,  increasing ROM, reducing/eliminating soft tissue swelling/inflammation/restriction, improving soft tissue extensibility and allowing for proper ROM for normal function with self care, mobility, lifting and ambulation.      Modalities:    Charges:  Timed Code Treatment Minutes: 45   Total Treatment Minutes: 45       [] EVAL (LOW) 53700 (typically 20 minutes face-to-face)  [] EVAL (MOD) 22379 (typically 30 minutes face-to-face)  [] EVAL (HIGH) 11177 (typically 45 minutes face-to-face)  [] RE-EVAL   [x] VW(39025) x  2   [] IONTO  [x] NMR (85744) x  1   [x] VASO  [] Manual (71552) x      [] Other:  [] TA x      [] Cecillia Door Traction (64591)  [] ES(attended) (68879)      [] ES (un) (11032):     GOALS:   Patient stated goal: return to community activities     [] Progressing: [x] Met: [] Not Met: [] Adjusted     Therapist goals for Patient:   Short Term Goals: To be achieved in: 2-4 weeks  1. Independent in HEP and progression per patient tolerance, in order to prevent re-injury. [] Progressing: [x] Met: [] Not Met: [] Adjusted   2. Patient will have a decrease in pain to facilitate improvement in movement, function, and ADLs as indicated by Functional Deficits. [] Progressing: [x] Met: [] Not Met: [] Adjusted     Long Term Goals: To be achieved in: 12 weeks  1. Foto68/100to assist with reaching prior level of function. [x] Progressing: [] Met: [] Not Met: [] Adjusted  2. Patient will demonstrate increased AROM to =R to allow for proper joint functioning as indicated by patients Functional Deficits. [] Progressing: [x] Met: [] Not Met: [] Adjusted  3. Patient will demonstrate an increase in Strength to good proximal hip strength and control  in LE to allow for proper functional mobility as indicated by patients Functional Deficits. [x] Progressing: [] Met: [] Not Met: [] Adjusted  4. Patient will return to normal heel to toe gait in community  ascend descend 12 steps alternating  functional activities without increased symptoms or restriction. [x] Progressing: [] Met: [] Not Met: [] Adjusted  Overall Progression Towards Functional goals/ Treatment Progress Update:  [] Patient is progressing as expected towards functional goals listed. [x] Progression is slowed due to complexities/Impairments listed. [] Progression has been slowed due to co-morbidities.   [] Plan just implemented, too soon to assess goals progression <30days   [] Goals require adjustment due to lack of progress  [] Patient is not progressing as expected and requires additional follow up with physician  [] Other    Prognosis for POC: [x] Good [] Fair  [] Poor      Patient requires continued skilled intervention: [x] Yes  [] No    Treatment/Activity Tolerance:  [x] Patient able to complete treatment  [] Patient limited by fatigue  [] Patient limited by pain     [] Patient limited by other medical complications  [] Other:    Patient Education: pt to take 10 days off of exercises, other than stretches    Access Code: [email or web address]  URL: [email or web address]  Date: 11/16/2022  Prepared by: Nirmala Wilder    Program Notes  ITB rolling x3-5 min    Exercises  Long Sitting Quad Set - 1 x daily - 7 x weekly - 10 reps - 10 hold  Active Straight Leg Raise with Quad Set - 1 x daily - 7 x weekly - 3 sets - 10 reps - 10 sec first rep hold  Sidelying Hip Abduction - 1 x daily - 7 x weekly - 3 sets - 10 reps  Clamshell with Resistance - 1 x daily - 7 x weekly - 3 sets - 10 reps  Bridge with Upper Back on Swiss Ball - 1 x daily - 7 x weekly - 3 sets - 10 reps  Heel Raise on Step - 1 x daily - 7 x weekly - 3 sets - 10 reps  Standing Terminal Knee Extension with Resistance - 1 x daily - 7 x weekly - 3 sets - 10 reps - 5 sec hold  Wall Squat with Ball between Knees - 1 x daily - 7 x weekly - 1 sets - 10 reps - 10 sec hold  Single Leg Stance on Foam Pad - 1 x daily - 7 x weekly - 1 sets - 3 reps - 30 sec hold  Seated Long Arc Quad - 1 x daily - 7 x weekly - 2 sets - 10 reps  Prone Quadriceps Stretch - 1 x daily - 7 x weekly - 1 sets - 2 reps - 30 sec hold  Seated Hamstring Stretch - 1 x daily - 7 x weekly - 1 sets - 3 reps - 30 sec hold  Standing Gastroc Stretch on Step - 1 x daily - 7 x weekly - 1 sets - 3 reps - 30 sec hold     PLAN: See eval. Pt will continue x2/wk through Feb and drop to x1/wk  [x] Continue per plan of care [] Alter current plan (see comments above)  [] Plan of care initiated [] Hold pending MD visit [] Discharge      Electronically signed by:  Martínez Green, PT    Note: If patient does not return for scheduled/ recommended follow up visits, this note will serve as a discharge from care along with most recent update on progress.

## 2023-03-20 ENCOUNTER — HOSPITAL ENCOUNTER (OUTPATIENT)
Dept: PHYSICAL THERAPY | Age: 53
Setting detail: THERAPIES SERIES
Discharge: HOME | End: 2023-03-20
Payer: COMMERCIAL

## 2023-03-20 NOTE — FLOWSHEET NOTE
Physical Therapy  Cancellation/No-show Note  Patient Name:  Naveen Monge  :  1970   Date:  3/20/2023  Cancelled visits to date: 1  No-shows to date: 0    For today's appointment patient:  [x]  Cancelled  []  Rescheduled appointment  []  No-show     Reason given by patient:  []  Patient ill  []  Conflicting appointment  []  No transportation    []  Conflict with work  [x]  No reason given  []  Other:     Comments:      Electronically signed by:  Alicia Multani PT, PT

## 2023-03-22 ENCOUNTER — APPOINTMENT (OUTPATIENT)
Dept: PHYSICAL THERAPY | Age: 53
End: 2023-03-22
Payer: COMMERCIAL

## 2023-03-27 ENCOUNTER — HOSPITAL ENCOUNTER (OUTPATIENT)
Dept: PHYSICAL THERAPY | Age: 53
Setting detail: THERAPIES SERIES
Discharge: HOME | End: 2023-03-27
Payer: COMMERCIAL

## 2023-03-27 PROCEDURE — 97112 NEUROMUSCULAR REEDUCATION: CPT

## 2023-03-27 PROCEDURE — 97016 VASOPNEUMATIC DEVICE THERAPY: CPT

## 2023-03-27 PROCEDURE — 97110 THERAPEUTIC EXERCISES: CPT

## 2023-03-29 ENCOUNTER — HOSPITAL ENCOUNTER (OUTPATIENT)
Dept: PHYSICAL THERAPY | Age: 53
Setting detail: THERAPIES SERIES
Discharge: HOME | End: 2023-03-29
Payer: COMMERCIAL

## 2023-03-29 PROCEDURE — 97016 VASOPNEUMATIC DEVICE THERAPY: CPT

## 2023-03-29 PROCEDURE — 97112 NEUROMUSCULAR REEDUCATION: CPT

## 2023-03-29 PROCEDURE — 97110 THERAPEUTIC EXERCISES: CPT

## 2023-03-29 NOTE — FLOWSHEET NOTE
The [address] [place], [place] [address], [address], [address] [place] [address]  Phone: [phone]   Fax:     [phone]      Physical Therapy Daily Treatment Note  Date:  3/29/2023    Patient Name:  Divine Bach   \"JT\" :  1970  MRN: [id number]  Restrictions/Precautions:    Medical/Treatment Diagnosis Information:  Diagnosis: Primary osteoarthritis of left knee (M17.12)  Treatment Diagnosis: L knee pain Z87.414  Insurance/Certification information:  PT Insurance Information: [facility]  Physician Information:   Germania Delgado  Has the plan of care been signed (Y/N):        [x]  Yes  []  No     Date of Patient follow up with Physician: per md, mid Oct (not scheduled yet)      Is this a Progress Report:     []  Yes  [x]  No        If Yes:  Date Range for reporting period:  Beginning 22  Ending 3/8/23    Progress report will be due (10 Rx or 30 days whichever is less): 20      Recertification will be due (POC Duration  / 90 days whichever is less): 23        Visit # Insurance Allowable Auth Required   20  44 total 60 []  Yes []  No        Functional Scale: FOTO: 55/100    Date assessed:  23       Latex Allergy:  [x]NO      []YES  Preferred Language for Healthcare:   [x]English       []other:    Pain level:  0/10     SUBJECTIVE:  Pt reports he did not have as much pain in knee after PT last visit. He felt fatigued but was in as much pain as usual. Pt is also using arch supports now. DOS: 9/15/22    OBJECTIVE:   3/8: L knee ROM 0-135 °   , knee ext: 4/5, knee flex 4+/5  Observation: portal sites healing well; pt amb with no crutches;  minimal skin irritation still noted, small area of broken skin (will hold on tape today)  : Trial with Breg lateral stabilization brace today. There was a bit of an adjustment period in the clinic however patella seemed to ARTEMIO [facility] in place\" and brace felt good.  Pt to take home with him and continue

## 2023-04-03 ENCOUNTER — HOSPITAL ENCOUNTER (OUTPATIENT)
Dept: PHYSICAL THERAPY | Age: 53
Setting detail: THERAPIES SERIES
Discharge: HOME | End: 2023-04-03
Payer: COMMERCIAL

## 2023-04-03 PROCEDURE — 97112 NEUROMUSCULAR REEDUCATION: CPT

## 2023-04-03 PROCEDURE — 97110 THERAPEUTIC EXERCISES: CPT

## 2023-04-03 PROCEDURE — 97140 MANUAL THERAPY 1/> REGIONS: CPT

## 2023-04-03 PROCEDURE — 97016 VASOPNEUMATIC DEVICE THERAPY: CPT

## 2023-04-03 NOTE — FLOWSHEET NOTE
related to strengthening, flexibility, endurance, ROM of core, proximal hip and LE for functional self-care, mobility, lifting and ambulation/stair navigation   [] (64934)Reviewed/Progressed HEP activities related to improving balance, coordination, kinesthetic sense, posture, motor skill, proprioception of core, proximal hip and LE for self care, mobility, lifting, and ambulation/stair navigation      Manual Treatments:  PROM / STM / Oscillations-Mobs:  G-I, II, III, IV (PA's, Inf., Post.)  [x] (93645) Provided manual therapy to mobilize LE, proximal hip and/or LS spine soft tissue/joints for the purpose of modulating pain, promoting relaxation,  increasing ROM, reducing/eliminating soft tissue swelling/inflammation/restriction, improving soft tissue extensibility and allowing for proper ROM for normal function with self care, mobility, lifting and ambulation. Modalities:    Charges:  Timed Code Treatment Minutes: 45   Total Treatment Minutes: 45       [] EVAL (LOW) 48440 (typically 20 minutes face-to-face)  [] EVAL (MOD) 47701 (typically 30 minutes face-to-face)  [] EVAL (HIGH) 47767 (typically 45 minutes face-to-face)  [] RE-EVAL   [x] GJ(18360) x  2   [] IONTO  [x] NMR (14861) x  1   [x] VASO  [] Manual (42019) x      [] Other:  [] TA x      [] Mech Traction (06328)  [] ES(attended) (69857)      [] ES (un) (33866):     GOALS:   Patient stated goal: return to community activities     [] Progressing: [x] Met: [] Not Met: [] Adjusted     Therapist goals for Patient:   Short Term Goals: To be achieved in: 2-4 weeks  1. Independent in HEP and progression per patient tolerance, in order to prevent re-injury. [] Progressing: [x] Met: [] Not Met: [] Adjusted   2. Patient will have a decrease in pain to facilitate improvement in movement, function, and ADLs as indicated by Functional Deficits. [] Progressing: [x] Met: [] Not Met: [] Adjusted     Long Term Goals: To be achieved in: 12 weeks  1. Foto68/100to

## 2023-04-05 ENCOUNTER — HOSPITAL ENCOUNTER (OUTPATIENT)
Dept: PHYSICAL THERAPY | Age: 53
Setting detail: THERAPIES SERIES
Discharge: HOME | End: 2023-04-05
Payer: COMMERCIAL

## 2023-04-05 PROCEDURE — 97112 NEUROMUSCULAR REEDUCATION: CPT

## 2023-04-05 PROCEDURE — 97110 THERAPEUTIC EXERCISES: CPT

## 2023-04-05 NOTE — PROGRESS NOTES
The [address] [place], [place] [address], [address], [address] [place] [address]  Phone: [phone]   Fax:     [phone]      Physical Therapy Daily Treatment Note  Date:  2023    Patient Name:  Eduin Arambula   \"JT\" :  1970  MRN: [id number]  Restrictions/Precautions:    Medical/Treatment Diagnosis Information:  Diagnosis: Primary osteoarthritis of left knee (M17.12)  Treatment Diagnosis: L knee pain B24.840  Insurance/Certification information:  PT Insurance Information: [facility]  Physician Information:   Juanita Ayala MD  Has the plan of care been signed (Y/N):        [x]  Yes  []  No     Date of Patient follow up with Physician: per md, mid Oct (not scheduled yet)      Is this a Progress Report:     [x]  Yes  []  No        If Yes:  Date Range for reporting period:  Beginning 3/8/23  Ending 23    Progress report will be due (10 Rx or 30 days whichever is less): 23      Recertification will be due (POC Duration  / 90 days whichever is less): 23        Visit # Insurance Allowable Auth Required   22  46 total 60 []  Yes []  No        Functional Scale: FOTO: 55/100    Date assessed:  23       Latex Allergy:  [x]NO      []YES  Preferred Language for Healthcare:   [x]English       []other:    Pain level:  0/10     SUBJECTIVE:  Pt reports his knee feels ok. Pt tried ? reps to 15. DOS: 9/15/22    OBJECTIVE:   : L knee ROM 0-137 ° , knee ext 4/5, knee flex 4+/5; 2 cm difference in quad circumference compared to 3 cm 1 month ago  3/8: L knee ROM 0-135 °   , knee ext: 4/5, knee flex 4+/5  Observation: portal sites healing well; pt amb with no crutches;  minimal skin irritation still noted, small area of broken skin (will hold on tape today)  : Trial with Breg lateral stabilization brace today. There was a bit of an adjustment period in the clinic however patella seemed to [facility] in place\" and brace felt good.  Pt to take home

## 2023-04-12 ENCOUNTER — APPOINTMENT (OUTPATIENT)
Dept: PHYSICAL THERAPY | Age: 53
End: 2023-04-12
Payer: COMMERCIAL

## 2023-04-17 ENCOUNTER — HOSPITAL ENCOUNTER (OUTPATIENT)
Dept: PHYSICAL THERAPY | Age: 53
Setting detail: THERAPIES SERIES
Discharge: HOME | End: 2023-04-17
Payer: COMMERCIAL

## 2023-04-17 PROCEDURE — 97112 NEUROMUSCULAR REEDUCATION: CPT

## 2023-04-17 PROCEDURE — 97110 THERAPEUTIC EXERCISES: CPT

## 2023-04-17 NOTE — FLOWSHEET NOTE
than stretches    Access Code: [email or web address]  URL: [email or web address]  Date: 11/16/2022  Prepared by: Evon Garcia     PLAN: See eval.   [x] Continue per plan of care [] Alter current plan (see comments above)  [] Plan of care initiated [] Hold pending MD visit [] Discharge      Electronically signed by:  Malcom Vasquez PT    Note: If patient does not return for scheduled/ recommended follow up visits, this note will serve as a discharge from care along with most recent update on progress.

## 2023-04-19 ENCOUNTER — HOSPITAL ENCOUNTER (OUTPATIENT)
Dept: PHYSICAL THERAPY | Age: 53
Setting detail: THERAPIES SERIES
Discharge: HOME | End: 2023-04-19
Payer: COMMERCIAL

## 2023-04-19 PROCEDURE — 97112 NEUROMUSCULAR REEDUCATION: CPT

## 2023-04-19 PROCEDURE — 97110 THERAPEUTIC EXERCISES: CPT

## 2023-04-19 NOTE — FLOWSHEET NOTE
Exercise Program:    [x] (02317) Reviewed/Progressed HEP activities related to strengthening, flexibility, endurance, ROM of core, proximal hip and LE for functional self-care, mobility, lifting and ambulation/stair navigation   [] (68729)Reviewed/Progressed HEP activities related to improving balance, coordination, kinesthetic sense, posture, motor skill, proprioception of core, proximal hip and LE for self care, mobility, lifting, and ambulation/stair navigation      Manual Treatments:  PROM / STM / Oscillations-Mobs:  G-I, II, III, IV (PA's, Inf., Post.)  [] (24455) Provided manual therapy to mobilize LE, proximal hip and/or LS spine soft tissue/joints for the purpose of modulating pain, promoting relaxation,  increasing ROM, reducing/eliminating soft tissue swelling/inflammation/restriction, improving soft tissue extensibility and allowing for proper ROM for normal function with self care, mobility, lifting and ambulation. Modalities:    Charges:  Timed Code Treatment Minutes: 45   Total Treatment Minutes: 45       [] EVAL (LOW) 24723 (typically 20 minutes face-to-face)  [] EVAL (MOD) 96131 (typically 30 minutes face-to-face)  [] EVAL (HIGH) 20727 (typically 45 minutes face-to-face)  [] RE-EVAL   [x] FQ(52103) x  2   [] IONTO  [x] NMR (73042) x  1   [] VASO  [] Manual (00973) x      [] Other:  [] TA x      [] Mech Traction (21861)  [] ES(attended) (57566)      [] ES (un) (81321):     GOALS:   Patient stated goal: return to community activities     [] Progressing: [x] Met: [] Not Met: [] Adjusted     Therapist goals for Patient:   Short Term Goals: To be achieved in: 2-4 weeks  1. Independent in HEP and progression per patient tolerance, in order to prevent re-injury. [] Progressing: [x] Met: [] Not Met: [] Adjusted   2. Patient will have a decrease in pain to facilitate improvement in movement, function, and ADLs as indicated by Functional Deficits.     [] Progressing: [x] Met: [] Not Met: [] Adjusted

## 2023-04-24 ENCOUNTER — HOSPITAL ENCOUNTER (OUTPATIENT)
Dept: PHYSICAL THERAPY | Age: 53
Setting detail: THERAPIES SERIES
Discharge: HOME | End: 2023-04-24
Payer: COMMERCIAL

## 2023-04-24 PROCEDURE — 97110 THERAPEUTIC EXERCISES: CPT

## 2023-04-24 PROCEDURE — 97112 NEUROMUSCULAR REEDUCATION: CPT

## 2023-04-24 NOTE — FLOWSHEET NOTE
Access Code: [email or web address]  URL: [email or web address]  Date: 11/16/2022  Prepared by: Kenya Quintanilla     PLAN: See eval.   [x] Continue per plan of care [] Alter current plan (see comments above)  [] Plan of care initiated [] Hold pending MD visit [] Discharge      Electronically signed by:  Kyler Marquez, PT    Note: If patient does not return for scheduled/ recommended follow up visits, this note will serve as a discharge from care along with most recent update on progress.

## 2023-04-26 ENCOUNTER — APPOINTMENT (OUTPATIENT)
Dept: PHYSICAL THERAPY | Age: 53
End: 2023-04-26
Payer: COMMERCIAL

## 2023-05-01 ENCOUNTER — HOSPITAL ENCOUNTER (OUTPATIENT)
Dept: PHYSICAL THERAPY | Age: 53
Setting detail: THERAPIES SERIES
Discharge: HOME | End: 2023-05-01
Payer: COMMERCIAL

## 2023-05-01 PROCEDURE — 97112 NEUROMUSCULAR REEDUCATION: CPT

## 2023-05-01 PROCEDURE — 97110 THERAPEUTIC EXERCISES: CPT

## 2023-05-01 NOTE — FLOWSHEET NOTE
The [facility], [address] [facility] [address] [place] [address], [address] [place] [address]  Phone: [phone]   Fax:     [phone]      Physical Therapy Daily Treatment Note  Date:  2023    Patient Name:  Sabina Espinosa   \"JT\" :  1970  MRN: [id number]  Restrictions/Precautions:    Medical/Treatment Diagnosis Information:  Diagnosis: Primary osteoarthritis of left knee (M17.12)  Treatment Diagnosis: L knee pain I73.172  Insurance/Certification information:  PT Insurance Information: [facility]  Physician Information:   Angelina Qureshi MD  Has the plan of care been signed (Y/N):        [x]  Yes  []  No     Date of Patient follow up with Physician: per md, mid Oct (not scheduled yet)      Is this a Progress Report:     []  Yes  [x]  No        If Yes:  Date Range for reporting period:  Beginning 3/8/23  Ending 23    Progress report will be due (10 Rx or 30 days whichever is less): 3/4/07      Recertification will be due (POC Duration  / 90 days whichever is less): 23        Visit # Insurance Allowable Auth Required   26  50 total 60 []  Yes []  No        Functional Scale: FOTO: 55/100    Date assessed:  23       Latex Allergy:  [x]NO      []YES  Preferred Language for Healthcare:   [x]English       []other:    Pain level:  0-2/10    SUBJECTIVE:  Pt reports more of the same symptoms with descending steps, hit or miss. Sometimes it's so tight along patellar tendon but there are times he just \"goes\" and it's ok the whole way down.  He is almost entirely unable to carry anything with both hands down the steps    DOS: 9/15/22    OBJECTIVE:   : L knee ROM 0-137 ° , knee ext 4/5, knee flex 4+/5; 2 cm difference in quad circumference compared to 3 cm 1 month ago  3/8: L knee ROM 0-135 °   , knee ext: 4/5, knee flex 4+/5  Observation: portal sites healing well; pt amb with no crutches;  minimal skin irritation still noted, small area of broken skin (will hold

## 2023-05-08 ENCOUNTER — HOSPITAL ENCOUNTER (OUTPATIENT)
Dept: PHYSICAL THERAPY | Age: 53
Setting detail: THERAPIES SERIES
Discharge: HOME | End: 2023-05-08
Payer: COMMERCIAL

## 2023-05-08 PROCEDURE — 97112 NEUROMUSCULAR REEDUCATION: CPT

## 2023-05-08 PROCEDURE — 97110 THERAPEUTIC EXERCISES: CPT

## 2023-05-08 NOTE — FLOWSHEET NOTE
The [address] [place] [address],[address], [address], [address] [place] [address]  Phone: [phone]   Fax:     [phone]      Physical Therapy Daily Treatment Note  Date:  2023    Patient Name:  Reuben Crocker   \"JT\" :  1970  MRN: [id number]  Restrictions/Precautions:    Medical/Treatment Diagnosis Information:  Diagnosis: Primary osteoarthritis of left knee (M17.12)  Treatment Diagnosis: L knee pain S91.512  Insurance/Certification information:  PT Insurance Information: [facility]  Physician Information:   Shannan Abebe MD  Has the plan of care been signed (Y/N):        [x]  Yes  []  No     Date of Patient follow up with Physician: per md, mid Oct (not scheduled yet)      Is this a Progress Report:     []  Yes  [x]  No        If Yes:  Date Range for reporting period:  Beginning 3/8/23  Ending 23    Progress report will be due (10 Rx or 30 days whichever is less): 50      Recertification will be due (POC Duration  / 90 days whichever is less): 23        Visit # Insurance Allowable Auth Required   27  49 total 60 []  Yes []  No        Functional Scale: FOTO: 55/100    Date assessed:  23       Latex Allergy:  [x]NO      []YES  Preferred Language for Healthcare:   [x]English       []other:    Pain level:  0-2/10    SUBJECTIVE:  Pt reports was able to tolerate a lot of yardwork this weekend. He does feel his knee is improving. DOS: 9/15/22    OBJECTIVE:   : L knee ROM 0-137 ° , knee ext 4/5, knee flex 4+/5; 2 cm difference in quad circumference compared to 3 cm 1 month ago  3/8: L knee ROM 0-135 °   , knee ext: 4/5, knee flex 4+/5  Observation: portal sites healing well; pt amb with no crutches;  minimal skin irritation still noted, small area of broken skin (will hold on tape today)  : Trial with Breg lateral stabilization brace today.  There was a bit of an adjustment period in the clinic however patella seemed to [facility]

## 2023-05-15 ENCOUNTER — HOSPITAL ENCOUNTER (OUTPATIENT)
Dept: PHYSICAL THERAPY | Age: 53
Setting detail: THERAPIES SERIES
Discharge: HOME | End: 2023-05-15
Payer: COMMERCIAL

## 2023-05-15 PROCEDURE — 97110 THERAPEUTIC EXERCISES: CPT

## 2023-05-15 PROCEDURE — 97112 NEUROMUSCULAR REEDUCATION: CPT

## 2023-05-15 NOTE — PROGRESS NOTES
strengthening, flexibility, endurance, ROM of core, proximal hip and LE for functional self-care, mobility, lifting and ambulation/stair navigation   [] (47823)Reviewed/Progressed HEP activities related to improving balance, coordination, kinesthetic sense, posture, motor skill, proprioception of core, proximal hip and LE for self care, mobility, lifting, and ambulation/stair navigation      Manual Treatments:  PROM / STM / Oscillations-Mobs:  G-I, II, III, IV (PA's, Inf., Post.)  [] (75749) Provided manual therapy to mobilize LE, proximal hip and/or LS spine soft tissue/joints for the purpose of modulating pain, promoting relaxation,  increasing ROM, reducing/eliminating soft tissue swelling/inflammation/restriction, improving soft tissue extensibility and allowing for proper ROM for normal function with self care, mobility, lifting and ambulation. Modalities:    Charges:  Timed Code Treatment Minutes: 45   Total Treatment Minutes: 45       [] EVAL (LOW) 50001 (typically 20 minutes face-to-face)  [] EVAL (MOD) 49060 (typically 30 minutes face-to-face)  [] EVAL (HIGH) 11234 (typically 45 minutes face-to-face)  [] RE-EVAL   [x] VJ(94346) x  2   [] IONTO  [x] NMR (20574) x  1   [] VASO  [] Manual (91248) x      [] Other:  [] TA x      [] Mech Traction (92822)  [] ES(attended) (41310)      [] ES (un) (08603):     GOALS:   Patient stated goal: return to community activities     [] Progressing: [x] Met: [] Not Met: [] Adjusted     Therapist goals for Patient:   Short Term Goals: To be achieved in: 2-4 weeks  1. Independent in HEP and progression per patient tolerance, in order to prevent re-injury. [] Progressing: [x] Met: [] Not Met: [] Adjusted   2. Patient will have a decrease in pain to facilitate improvement in movement, function, and ADLs as indicated by Functional Deficits. [] Progressing: [x] Met: [] Not Met: [] Adjusted     Long Term Goals: To be achieved in: 12 weeks  1. Foto68/100to assist with

## (undated) DEVICE — GOWN,SIRUS,POLYRNF,BRTHSLV,XL,30/CS: Brand: MEDLINE

## (undated) DEVICE — TUBING PMP L16FT MAIN DISP FOR AR-6400 AR-6475

## (undated) DEVICE — KNEE ARTHROSCOPY: Brand: MEDLINE INDUSTRIES, INC.

## (undated) DEVICE — PAD DRY FLOOR ABS 32X58IN GRN

## (undated) DEVICE — GLOVE SURG SZ 9 L12IN FNGR THK79MIL GRN LTX FREE

## (undated) DEVICE — TUBING FLD MGMT Y DBL SPIK DUALWAVE

## (undated) DEVICE — SOLUTION IRRIG 3000ML LAC R FLX CONT

## (undated) DEVICE — ADHESIVE SKIN CLSR 0.7ML TOP DERMBND ADV

## (undated) DEVICE — GLOVE SURG SZ 9 L1185IN FNGR THK75MIL STRW LTX POLYMER BEAD

## (undated) DEVICE — TUBING PMP L6FT CONT WAVE EXTN

## (undated) DEVICE — COVER,MAYO STAND,XL,STERILE: Brand: MEDLINE

## (undated) DEVICE — TOWEL,STOP FLAG GOLD N-W: Brand: MEDLINE

## (undated) DEVICE — PAD,NON-ADHERENT,3X8,STERILE,LF,1/PK: Brand: MEDLINE

## (undated) DEVICE — GOWN,SIRUS,POLYRNF,BRTHSLV,XLN/XXL,18/CS: Brand: MEDLINE

## (undated) DEVICE — SUTURE MCRYL SZ 4-0 L27IN ABSRB UD L19MM PS-2 1/2 CIR PRIM Y426H

## (undated) DEVICE — BLADE SHV L13CM DIA4MM TAPR TIP SCIS LIKE CUT OVL OUTER